# Patient Record
Sex: FEMALE | URBAN - METROPOLITAN AREA
[De-identification: names, ages, dates, MRNs, and addresses within clinical notes are randomized per-mention and may not be internally consistent; named-entity substitution may affect disease eponyms.]

---

## 2017-03-29 ENCOUNTER — INPATIENT (INPATIENT)
Facility: HOSPITAL | Age: 72
LOS: 6 days | Discharge: NURSING, REHAB OR LONG-TERM CARE | DRG: 190 | End: 2017-04-05
Attending: INTERNAL MEDICINE | Admitting: INTERNAL MEDICINE
Payer: MEDICARE

## 2017-03-29 VITALS
SYSTOLIC BLOOD PRESSURE: 109 MMHG | WEIGHT: 199.96 LBS | TEMPERATURE: 99 F | HEIGHT: 63 IN | DIASTOLIC BLOOD PRESSURE: 62 MMHG | RESPIRATION RATE: 30 BRPM | HEART RATE: 109 BPM | OXYGEN SATURATION: 96 %

## 2017-03-29 DIAGNOSIS — I50.9 HEART FAILURE, UNSPECIFIED: ICD-10-CM

## 2017-03-29 LAB
ALBUMIN SERPL ELPH-MCNC: 3 G/DL — LOW (ref 3.5–5)
ALP SERPL-CCNC: 68 U/L — SIGNIFICANT CHANGE UP (ref 40–120)
ALT FLD-CCNC: 20 U/L DA — SIGNIFICANT CHANGE UP (ref 10–60)
ANION GAP SERPL CALC-SCNC: 10 MMOL/L — SIGNIFICANT CHANGE UP (ref 5–17)
AST SERPL-CCNC: 15 U/L — SIGNIFICANT CHANGE UP (ref 10–40)
BASE EXCESS BLDA CALC-SCNC: 10.7 MMOL/L — HIGH (ref -2–2)
BASE EXCESS BLDA CALC-SCNC: 11 MMOL/L — HIGH (ref -2–2)
BASE EXCESS BLDA CALC-SCNC: 8.2 MMOL/L — HIGH (ref -2–2)
BASOPHILS # BLD AUTO: 0.1 K/UL — SIGNIFICANT CHANGE UP (ref 0–0.2)
BASOPHILS NFR BLD AUTO: 1.2 % — SIGNIFICANT CHANGE UP (ref 0–2)
BILIRUB SERPL-MCNC: 0.2 MG/DL — SIGNIFICANT CHANGE UP (ref 0.2–1.2)
BLOOD GAS COMMENTS ARTERIAL: SIGNIFICANT CHANGE UP
BLOOD GAS COMMENTS ARTERIAL: SIGNIFICANT CHANGE UP
BUN SERPL-MCNC: 29 MG/DL — HIGH (ref 7–18)
CALCIUM SERPL-MCNC: 9.1 MG/DL — SIGNIFICANT CHANGE UP (ref 8.4–10.5)
CHLORIDE SERPL-SCNC: 97 MMOL/L — SIGNIFICANT CHANGE UP (ref 96–108)
CK MB BLD-MCNC: <1.4 % — SIGNIFICANT CHANGE UP (ref 0–3.5)
CK MB CFR SERPL CALC: <1 NG/ML — SIGNIFICANT CHANGE UP (ref 0–3.6)
CK SERPL-CCNC: 71 U/L — SIGNIFICANT CHANGE UP (ref 21–215)
CO2 SERPL-SCNC: 33 MMOL/L — HIGH (ref 22–31)
CREAT SERPL-MCNC: 1.02 MG/DL — SIGNIFICANT CHANGE UP (ref 0.5–1.3)
EOSINOPHIL # BLD AUTO: 0.2 K/UL — SIGNIFICANT CHANGE UP (ref 0–0.5)
EOSINOPHIL NFR BLD AUTO: 1.7 % — SIGNIFICANT CHANGE UP (ref 0–6)
GLUCOSE SERPL-MCNC: 142 MG/DL — HIGH (ref 70–99)
HCO3 BLDA-SCNC: 35 MMOL/L — HIGH (ref 23–27)
HCO3 BLDA-SCNC: 39 MMOL/L — HIGH (ref 23–27)
HCO3 BLDA-SCNC: 39 MMOL/L — HIGH (ref 23–27)
HCT VFR BLD CALC: 35.3 % — SIGNIFICANT CHANGE UP (ref 34.5–45)
HGB BLD-MCNC: 11.1 G/DL — LOW (ref 11.5–15.5)
HMPV RNA SPEC QL NAA+PROBE: DETECTED
HOROWITZ INDEX BLDA+IHG-RTO: 100 — SIGNIFICANT CHANGE UP
HOROWITZ INDEX BLDA+IHG-RTO: 40 — SIGNIFICANT CHANGE UP
HOROWITZ INDEX BLDA+IHG-RTO: 40 — SIGNIFICANT CHANGE UP
LYMPHOCYTES # BLD AUTO: 1.8 K/UL — SIGNIFICANT CHANGE UP (ref 1–3.3)
LYMPHOCYTES # BLD AUTO: 20.7 % — SIGNIFICANT CHANGE UP (ref 13–44)
MCHC RBC-ENTMCNC: 26.3 PG — LOW (ref 27–34)
MCHC RBC-ENTMCNC: 31.4 GM/DL — LOW (ref 32–36)
MCV RBC AUTO: 83.6 FL — SIGNIFICANT CHANGE UP (ref 80–100)
MONOCYTES # BLD AUTO: 1.2 K/UL — HIGH (ref 0–0.9)
MONOCYTES NFR BLD AUTO: 13.6 % — SIGNIFICANT CHANGE UP (ref 2–14)
NEUTROPHILS # BLD AUTO: 5.5 K/UL — SIGNIFICANT CHANGE UP (ref 1.8–7.4)
NEUTROPHILS NFR BLD AUTO: 62.8 % — SIGNIFICANT CHANGE UP (ref 43–77)
PCO2 BLDA: 64 MMHG — HIGH (ref 32–46)
PCO2 BLDA: 77 MMHG — CRITICAL HIGH (ref 32–46)
PCO2 BLDA: 81 MMHG — CRITICAL HIGH (ref 32–46)
PH BLDA: 7.31 — LOW (ref 7.35–7.45)
PH BLDA: 7.33 — LOW (ref 7.35–7.45)
PH BLDA: 7.36 — SIGNIFICANT CHANGE UP (ref 7.35–7.45)
PLATELET # BLD AUTO: 277 K/UL — SIGNIFICANT CHANGE UP (ref 150–400)
PO2 BLDA: 131 MMHG — HIGH (ref 74–108)
PO2 BLDA: 194 MMHG — HIGH (ref 74–108)
PO2 BLDA: 69 MMHG — LOW (ref 74–108)
POTASSIUM SERPL-MCNC: 3.7 MMOL/L — SIGNIFICANT CHANGE UP (ref 3.5–5.3)
POTASSIUM SERPL-SCNC: 3.7 MMOL/L — SIGNIFICANT CHANGE UP (ref 3.5–5.3)
PROT SERPL-MCNC: 6.9 G/DL — SIGNIFICANT CHANGE UP (ref 6–8.3)
RAPID RVP RESULT: DETECTED
RBC # BLD: 4.22 M/UL — SIGNIFICANT CHANGE UP (ref 3.8–5.2)
RBC # FLD: 15.5 % — HIGH (ref 10.3–14.5)
SAO2 % BLDA: 100 % — HIGH (ref 92–96)
SAO2 % BLDA: 92 % — SIGNIFICANT CHANGE UP (ref 92–96)
SAO2 % BLDA: 99 % — HIGH (ref 92–96)
SODIUM SERPL-SCNC: 140 MMOL/L — SIGNIFICANT CHANGE UP (ref 135–145)
TROPONIN I SERPL-MCNC: <0.015 NG/ML — SIGNIFICANT CHANGE UP (ref 0–0.04)
WBC # BLD: 8.8 K/UL — SIGNIFICANT CHANGE UP (ref 3.8–10.5)
WBC # FLD AUTO: 8.8 K/UL — SIGNIFICANT CHANGE UP (ref 3.8–10.5)

## 2017-03-29 PROCEDURE — 71010: CPT | Mod: 26

## 2017-03-29 PROCEDURE — 99291 CRITICAL CARE FIRST HOUR: CPT

## 2017-03-29 RX ORDER — FUROSEMIDE 40 MG
20 TABLET ORAL ONCE
Refills: 0 | Status: COMPLETED | OUTPATIENT
Start: 2017-03-29 | End: 2017-03-29

## 2017-03-29 RX ORDER — SODIUM CHLORIDE 9 MG/ML
1000 INJECTION, SOLUTION INTRAVENOUS
Refills: 0 | Status: DISCONTINUED | OUTPATIENT
Start: 2017-03-29 | End: 2017-04-05

## 2017-03-29 RX ORDER — DEXTROSE 50 % IN WATER 50 %
1 SYRINGE (ML) INTRAVENOUS ONCE
Refills: 0 | Status: DISCONTINUED | OUTPATIENT
Start: 2017-03-29 | End: 2017-04-05

## 2017-03-29 RX ORDER — MONTELUKAST 4 MG/1
10 TABLET, CHEWABLE ORAL DAILY
Refills: 0 | Status: DISCONTINUED | OUTPATIENT
Start: 2017-03-29 | End: 2017-04-05

## 2017-03-29 RX ORDER — GLUCAGON INJECTION, SOLUTION 0.5 MG/.1ML
1 INJECTION, SOLUTION SUBCUTANEOUS ONCE
Refills: 0 | Status: DISCONTINUED | OUTPATIENT
Start: 2017-03-29 | End: 2017-04-05

## 2017-03-29 RX ORDER — ENOXAPARIN SODIUM 100 MG/ML
40 INJECTION SUBCUTANEOUS DAILY
Refills: 0 | Status: DISCONTINUED | OUTPATIENT
Start: 2017-03-29 | End: 2017-04-05

## 2017-03-29 RX ORDER — DEXTROSE 50 % IN WATER 50 %
25 SYRINGE (ML) INTRAVENOUS ONCE
Refills: 0 | Status: DISCONTINUED | OUTPATIENT
Start: 2017-03-29 | End: 2017-04-05

## 2017-03-29 RX ORDER — FUROSEMIDE 40 MG
40 TABLET ORAL EVERY 12 HOURS
Refills: 0 | Status: DISCONTINUED | OUTPATIENT
Start: 2017-03-29 | End: 2017-03-30

## 2017-03-29 RX ORDER — FUROSEMIDE 40 MG
40 TABLET ORAL ONCE
Refills: 0 | Status: COMPLETED | OUTPATIENT
Start: 2017-03-29 | End: 2017-03-29

## 2017-03-29 RX ORDER — DEXTROSE 50 % IN WATER 50 %
12.5 SYRINGE (ML) INTRAVENOUS ONCE
Refills: 0 | Status: DISCONTINUED | OUTPATIENT
Start: 2017-03-29 | End: 2017-04-05

## 2017-03-29 RX ORDER — IPRATROPIUM/ALBUTEROL SULFATE 18-103MCG
3 AEROSOL WITH ADAPTER (GRAM) INHALATION ONCE
Refills: 0 | Status: COMPLETED | OUTPATIENT
Start: 2017-03-29 | End: 2017-03-29

## 2017-03-29 RX ORDER — ATORVASTATIN CALCIUM 80 MG/1
20 TABLET, FILM COATED ORAL AT BEDTIME
Refills: 0 | Status: DISCONTINUED | OUTPATIENT
Start: 2017-03-29 | End: 2017-04-05

## 2017-03-29 RX ORDER — ASPIRIN/CALCIUM CARB/MAGNESIUM 324 MG
81 TABLET ORAL DAILY
Refills: 0 | Status: DISCONTINUED | OUTPATIENT
Start: 2017-03-29 | End: 2017-04-05

## 2017-03-29 RX ORDER — PANTOPRAZOLE SODIUM 20 MG/1
40 TABLET, DELAYED RELEASE ORAL DAILY
Refills: 0 | Status: DISCONTINUED | OUTPATIENT
Start: 2017-03-29 | End: 2017-03-31

## 2017-03-29 RX ORDER — LOSARTAN POTASSIUM 100 MG/1
100 TABLET, FILM COATED ORAL DAILY
Refills: 0 | Status: DISCONTINUED | OUTPATIENT
Start: 2017-03-29 | End: 2017-04-05

## 2017-03-29 RX ORDER — INSULIN LISPRO 100/ML
VIAL (ML) SUBCUTANEOUS
Refills: 0 | Status: DISCONTINUED | OUTPATIENT
Start: 2017-03-29 | End: 2017-03-31

## 2017-03-29 RX ORDER — LEVOTHYROXINE SODIUM 125 MCG
75 TABLET ORAL DAILY
Refills: 0 | Status: DISCONTINUED | OUTPATIENT
Start: 2017-03-29 | End: 2017-04-05

## 2017-03-29 RX ORDER — CEFTRIAXONE 500 MG/1
1 INJECTION, POWDER, FOR SOLUTION INTRAMUSCULAR; INTRAVENOUS EVERY 24 HOURS
Refills: 0 | Status: DISCONTINUED | OUTPATIENT
Start: 2017-03-29 | End: 2017-04-01

## 2017-03-29 RX ORDER — OLANZAPINE 15 MG/1
10 TABLET, FILM COATED ORAL DAILY
Refills: 0 | Status: DISCONTINUED | OUTPATIENT
Start: 2017-03-29 | End: 2017-04-05

## 2017-03-29 RX ORDER — IPRATROPIUM/ALBUTEROL SULFATE 18-103MCG
3 AEROSOL WITH ADAPTER (GRAM) INHALATION EVERY 6 HOURS
Refills: 0 | Status: DISCONTINUED | OUTPATIENT
Start: 2017-03-29 | End: 2017-04-05

## 2017-03-29 RX ADMIN — Medication 125 MILLIGRAM(S): at 14:47

## 2017-03-29 RX ADMIN — Medication 3 MILLILITER(S): at 14:47

## 2017-03-29 RX ADMIN — ENOXAPARIN SODIUM 40 MILLIGRAM(S): 100 INJECTION SUBCUTANEOUS at 17:51

## 2017-03-29 RX ADMIN — Medication 20 MILLIGRAM(S): at 15:38

## 2017-03-29 RX ADMIN — Medication 40 MILLIGRAM(S): at 16:58

## 2017-03-29 RX ADMIN — Medication 3 MILLILITER(S): at 20:55

## 2017-03-29 RX ADMIN — CEFTRIAXONE 100 GRAM(S): 500 INJECTION, POWDER, FOR SOLUTION INTRAMUSCULAR; INTRAVENOUS at 16:06

## 2017-03-29 RX ADMIN — PANTOPRAZOLE SODIUM 40 MILLIGRAM(S): 20 TABLET, DELAYED RELEASE ORAL at 22:54

## 2017-03-29 RX ADMIN — Medication 40 MILLIGRAM(S): at 17:48

## 2017-03-29 NOTE — H&P ADULT. - PROBLEM SELECTOR PLAN 1
Most likely due to COPD exacerbation possibly due to flu or pneumonia  Admit to ICU  - Continue with noninvasive mechanical ventilation  - Continue with NPO status  - Continue with duoneb  - Continue with solu-medrol  - Continue to monitor respiratory improvement. Transition to nasal cannula as respiratory status improves.  - Continue with lasix for diuresis  - Plan to intubate patient if respiratory status worsens.

## 2017-03-29 NOTE — H&P ADULT. - NEGATIVE CARDIOVASCULAR SYMPTOMS
no chest pain/no palpitations/no orthopnea/no paroxysmal nocturnal dyspnea/no peripheral edema/no claudication

## 2017-03-29 NOTE — H&P ADULT. - PROBLEM SELECTOR PLAN 7
Bilateral lower extremity cellulitis which was treated with 14 days of outpatient doxycycline.  - Continue with ceftriaxone for COPD exacerbation which will cover cellulitis as well  - Send blood cultures if patient has fever  - Area marked, monitor for improvement.

## 2017-03-29 NOTE — H&P ADULT. - ATTENDING COMMENTS
71 female with schizoaffective disorder, asthma/copd, diastolic CHF, morbid obesity, presents with acute respiratory failure requiring bipap, wheezing on exam, evidence of fluid overload on CXR and physical exam.  Suspect major issue is acute on chronic diastolic CHF exacerbation.  Will treat with bipap, diuretics, ceftriaxone for possible pneumonia and bilat lower extremity cellulitis (may simply be stasis dermatitis from swelling), steroids, nebs, check echo, troponins, and RVP.  Total CC time 35 min.

## 2017-03-29 NOTE — ED PROVIDER NOTE - PMH
Arthritis    Bipolar 1 disorder    Chronic diastolic congestive heart failure    Chronic obstructive pulmonary disease, unspecified COPD type    HTN (hypertension)    Hypercholesteremia    Hypothyroidism    Schizo affective schizophrenia

## 2017-03-29 NOTE — ED PROVIDER NOTE - CRITICAL CARE PROVIDED
direct patient care (not related to procedure)/additional history taking/interpretation of diagnostic studies/documentation/consultation with other physicians

## 2017-03-29 NOTE — H&P ADULT. - HISTORY OF PRESENT ILLNESS
71 [facility], [facility], HT[facility], SIADH, hypothyroidism, schizoaffective hypercholesterolemia sent to the ED for shortness of breath.  As per the NH, patient was fine until 1 pm today when she complained of difficulty breathing initially on minimal exertion and then at rest. She was given a respiratory treatment with minimal improvement. She was found to be wheezing despite the respiratory treatment and became slightly lethargic at which point EMS was called and she was transferred to [facility] ED. She has bilateral lower extremity redness that was treated with doxycyline for the last 14 days at the NH. While in the ER, patient was found to be in moderate respiratory distress and placed on non-invasive mechanical ventilation. ICU was consulted for continuous need of bipap and concern for need of invasive mechanical ventilation. There was no history of cough, fever, chest pain, abdominal pain, leg swelling or syncope. 71 year old female from [facility] PMH of COPD, asthma, CHFpef Grade 3 DD, HTN, SIADH, hypothyroidism, schizoaffective hypercholesterolemia sent to the ED for shortness of breath.  As per the NH, patient was fine until 1 pm today when she complained of difficulty breathing initially on minimal exertion and then at rest. She was given a respiratory treatment with minimal improvement. She was found to be wheezing despite the respiratory treatment and became slightly lethargic at which point EMS was called and she was transferred to [facility] ED. She has bilateral lower extremity redness that was treated with doxycyline for the last 14 days at the NH. While in the ER, patient was found to be in moderate respiratory distress and placed on non-invasive mechanical ventilation. ICU was consulted for continuous need of bipap and concern for need of invasive mechanical ventilation. There was no history of cough, fever, chest pain, abdominal pain, leg swelling or syncope.

## 2017-03-29 NOTE — ED PROVIDER NOTE - OBJECTIVE STATEMENT
pt arrived by ems in resp distress with bialt wheezing use of accessory muscles and diffituly speaking

## 2017-03-29 NOTE — H&P ADULT. - PROBLEM SELECTOR PLAN 4
- Continue with zyprexa  - Restart risperdal when patient is stable  - No need for psychiatry consult at this time

## 2017-03-29 NOTE — H&P ADULT. - PROBLEM SELECTOR PLAN 3
- Continue with zyprexa  - Restart risperdal when patient stable  - No need for psychiatry consult at this time

## 2017-03-29 NOTE — H&P ADULT. - PROBLEM SELECTOR PLAN 2
CHFpef Grade 3 diastolic dysfunction  - No signs of acute exacerbation  - Continue with lasix BID. Transition to once a day as respiratory status improves.  - Monitor I/O  - Monitor daily weight  - Continue with statin  - Continue with losartan  - Continue with statin  - Restart zaroxlyn when patient is stable

## 2017-03-29 NOTE — ED ADULT NURSE NOTE - OBJECTIVE STATEMENT
Patient received as notification for shortness of breath. Patient noted lethargic responsive to pain noted with toya scattered wheezing.

## 2017-03-29 NOTE — ED ADULT TRIAGE NOTE - CHIEF COMPLAINT QUOTE
bib/ems notification for respiratory distress , sent from NH for sudden onset for sob , wheezing . given albuterol nebs by ems

## 2017-03-29 NOTE — H&P ADULT. - PMH
Arthritis    Bipolar 1 disorder    Chronic obstructive pulmonary disease, unspecified COPD type    HTN (hypertension)    Hypercholesteremia    Hypothyroidism    Schizo affective schizophrenia Arthritis    Bipolar 1 disorder    Chronic diastolic congestive heart failure    Chronic obstructive pulmonary disease, unspecified COPD type    HTN (hypertension)    Hypercholesteremia    Hypothyroidism    Schizo affective schizophrenia

## 2017-03-29 NOTE — H&P ADULT. - ASSESSMENT
71 year old female from [facility] PMH of COPD, asthma, CHFpef Grade 3 DD, HTN, SIADH, hypothyroidism, schizoaffective hypercholesterolemia sent to the ED for shortness of breath. Most likely due to COPD exacerbation possibly due to flu or pneumonia. Chest x ray evident for slight interstitial markings which may signify vascular congestion contributing to respiratory failure.

## 2017-03-30 DIAGNOSIS — L03.119 CELLULITIS OF UNSPECIFIED PART OF LIMB: ICD-10-CM

## 2017-03-30 DIAGNOSIS — E03.9 HYPOTHYROIDISM, UNSPECIFIED: ICD-10-CM

## 2017-03-30 DIAGNOSIS — J96.01 ACUTE RESPIRATORY FAILURE WITH HYPOXIA: ICD-10-CM

## 2017-03-30 DIAGNOSIS — I50.32 CHRONIC DIASTOLIC (CONGESTIVE) HEART FAILURE: ICD-10-CM

## 2017-03-30 DIAGNOSIS — I15.9 SECONDARY HYPERTENSION, UNSPECIFIED: ICD-10-CM

## 2017-03-30 DIAGNOSIS — F25.0 SCHIZOAFFECTIVE DISORDER, BIPOLAR TYPE: ICD-10-CM

## 2017-03-30 DIAGNOSIS — F31.9 BIPOLAR DISORDER, UNSPECIFIED: ICD-10-CM

## 2017-03-30 LAB
ALBUMIN SERPL ELPH-MCNC: 3 G/DL — LOW (ref 3.5–5)
ALP SERPL-CCNC: 67 U/L — SIGNIFICANT CHANGE UP (ref 40–120)
ALT FLD-CCNC: 21 U/L DA — SIGNIFICANT CHANGE UP (ref 10–60)
ANION GAP SERPL CALC-SCNC: 7 MMOL/L — SIGNIFICANT CHANGE UP (ref 5–17)
APTT BLD: 41.4 SEC — HIGH (ref 27.5–37.4)
AST SERPL-CCNC: 17 U/L — SIGNIFICANT CHANGE UP (ref 10–40)
BASE EXCESS BLDA CALC-SCNC: 12.4 MMOL/L — HIGH (ref -2–2)
BILIRUB SERPL-MCNC: 0.3 MG/DL — SIGNIFICANT CHANGE UP (ref 0.2–1.2)
BUN SERPL-MCNC: 26 MG/DL — HIGH (ref 7–18)
BUN SERPL-MCNC: 28 MG/DL — HIGH (ref 7–18)
BUN SERPL-MCNC: 28 MG/DL — HIGH (ref 7–18)
CALCIUM SERPL-MCNC: 9.2 MG/DL — SIGNIFICANT CHANGE UP (ref 8.4–10.5)
CALCIUM SERPL-MCNC: 9.3 MG/DL — SIGNIFICANT CHANGE UP (ref 8.4–10.5)
CALCIUM SERPL-MCNC: 9.3 MG/DL — SIGNIFICANT CHANGE UP (ref 8.4–10.5)
CHLORIDE SERPL-SCNC: 94 MMOL/L — LOW (ref 96–108)
CHLORIDE SERPL-SCNC: 95 MMOL/L — LOW (ref 96–108)
CHLORIDE SERPL-SCNC: 98 MMOL/L — SIGNIFICANT CHANGE UP (ref 96–108)
CO2 SERPL-SCNC: 36 MMOL/L — HIGH (ref 22–31)
CO2 SERPL-SCNC: 38 MMOL/L — HIGH (ref 22–31)
CO2 SERPL-SCNC: 41 MMOL/L — HIGH (ref 22–31)
CREAT SERPL-MCNC: 0.82 MG/DL — SIGNIFICANT CHANGE UP (ref 0.5–1.3)
CREAT SERPL-MCNC: 0.86 MG/DL — SIGNIFICANT CHANGE UP (ref 0.5–1.3)
CREAT SERPL-MCNC: 0.88 MG/DL — SIGNIFICANT CHANGE UP (ref 0.5–1.3)
GLUCOSE SERPL-MCNC: 177 MG/DL — HIGH (ref 70–99)
GLUCOSE SERPL-MCNC: 180 MG/DL — HIGH (ref 70–99)
GLUCOSE SERPL-MCNC: 186 MG/DL — HIGH (ref 70–99)
HBA1C BLD-MCNC: 7.1 % — HIGH (ref 4–5.6)
HCO3 BLDA-SCNC: 39 MMOL/L — HIGH (ref 23–27)
HCT VFR BLD CALC: 35.9 % — SIGNIFICANT CHANGE UP (ref 34.5–45)
HGB BLD-MCNC: 11.2 G/DL — LOW (ref 11.5–15.5)
HOROWITZ INDEX BLDA+IHG-RTO: SIGNIFICANT CHANGE UP
INR BLD: 1.1 RATIO — SIGNIFICANT CHANGE UP (ref 0.88–1.16)
MAGNESIUM SERPL-MCNC: 2 MG/DL — SIGNIFICANT CHANGE UP (ref 1.8–2.4)
MAGNESIUM SERPL-MCNC: 2.2 MG/DL — SIGNIFICANT CHANGE UP (ref 1.8–2.4)
MCHC RBC-ENTMCNC: 26 PG — LOW (ref 27–34)
MCHC RBC-ENTMCNC: 31.3 GM/DL — LOW (ref 32–36)
MCV RBC AUTO: 83 FL — SIGNIFICANT CHANGE UP (ref 80–100)
PCO2 BLDA: 64 MMHG — HIGH (ref 32–46)
PH BLDA: 7.4 — SIGNIFICANT CHANGE UP (ref 7.35–7.45)
PHOSPHATE SERPL-MCNC: 2 MG/DL — LOW (ref 2.5–4.5)
PHOSPHATE SERPL-MCNC: 3.8 MG/DL — SIGNIFICANT CHANGE UP (ref 2.5–4.5)
PLATELET # BLD AUTO: 267 K/UL — SIGNIFICANT CHANGE UP (ref 150–400)
PO2 BLDA: 89 MMHG — SIGNIFICANT CHANGE UP (ref 74–108)
POTASSIUM SERPL-MCNC: 2.8 MMOL/L — CRITICAL LOW (ref 3.5–5.3)
POTASSIUM SERPL-MCNC: 3 MMOL/L — LOW (ref 3.5–5.3)
POTASSIUM SERPL-MCNC: 3.7 MMOL/L — SIGNIFICANT CHANGE UP (ref 3.5–5.3)
POTASSIUM SERPL-SCNC: 2.8 MMOL/L — CRITICAL LOW (ref 3.5–5.3)
POTASSIUM SERPL-SCNC: 3 MMOL/L — LOW (ref 3.5–5.3)
POTASSIUM SERPL-SCNC: 3.7 MMOL/L — SIGNIFICANT CHANGE UP (ref 3.5–5.3)
PROT SERPL-MCNC: 7.1 G/DL — SIGNIFICANT CHANGE UP (ref 6–8.3)
PROTHROM AB SERPL-ACNC: 12 SEC — SIGNIFICANT CHANGE UP (ref 9.8–12.7)
RBC # BLD: 4.32 M/UL — SIGNIFICANT CHANGE UP (ref 3.8–5.2)
RBC # FLD: 15.3 % — HIGH (ref 10.3–14.5)
SAO2 % BLDA: 97 % — HIGH (ref 92–96)
SODIUM SERPL-SCNC: 140 MMOL/L — SIGNIFICANT CHANGE UP (ref 135–145)
SODIUM SERPL-SCNC: 141 MMOL/L — SIGNIFICANT CHANGE UP (ref 135–145)
SODIUM SERPL-SCNC: 142 MMOL/L — SIGNIFICANT CHANGE UP (ref 135–145)
WBC # BLD: 7.3 K/UL — SIGNIFICANT CHANGE UP (ref 3.8–10.5)
WBC # FLD AUTO: 7.3 K/UL — SIGNIFICANT CHANGE UP (ref 3.8–10.5)

## 2017-03-30 PROCEDURE — 71010: CPT | Mod: 26

## 2017-03-30 RX ORDER — POTASSIUM CHLORIDE 20 MEQ
20 PACKET (EA) ORAL
Refills: 0 | Status: DISCONTINUED | OUTPATIENT
Start: 2017-03-30 | End: 2017-03-30

## 2017-03-30 RX ORDER — POTASSIUM CHLORIDE 20 MEQ
10 PACKET (EA) ORAL
Refills: 0 | Status: COMPLETED | OUTPATIENT
Start: 2017-03-30 | End: 2017-03-30

## 2017-03-30 RX ORDER — POTASSIUM CHLORIDE 20 MEQ
40 PACKET (EA) ORAL EVERY 4 HOURS
Refills: 0 | Status: COMPLETED | OUTPATIENT
Start: 2017-03-30 | End: 2017-03-30

## 2017-03-30 RX ORDER — POTASSIUM PHOSPHATE, MONOBASIC POTASSIUM PHOSPHATE, DIBASIC 236; 224 MG/ML; MG/ML
15 INJECTION, SOLUTION INTRAVENOUS ONCE
Refills: 0 | Status: COMPLETED | OUTPATIENT
Start: 2017-03-30 | End: 2017-03-30

## 2017-03-30 RX ORDER — FUROSEMIDE 40 MG
40 TABLET ORAL DAILY
Refills: 0 | Status: DISCONTINUED | OUTPATIENT
Start: 2017-03-31 | End: 2017-03-31

## 2017-03-30 RX ORDER — POTASSIUM CHLORIDE 20 MEQ
40 PACKET (EA) ORAL
Refills: 0 | Status: DISCONTINUED | OUTPATIENT
Start: 2017-03-30 | End: 2017-03-30

## 2017-03-30 RX ADMIN — Medication 3 MILLILITER(S): at 20:06

## 2017-03-30 RX ADMIN — Medication 100 MILLIEQUIVALENT(S): at 17:15

## 2017-03-30 RX ADMIN — OLANZAPINE 10 MILLIGRAM(S): 15 TABLET, FILM COATED ORAL at 11:02

## 2017-03-30 RX ADMIN — POTASSIUM PHOSPHATE, MONOBASIC POTASSIUM PHOSPHATE, DIBASIC 62.5 MILLIMOLE(S): 236; 224 INJECTION, SOLUTION INTRAVENOUS at 22:15

## 2017-03-30 RX ADMIN — PANTOPRAZOLE SODIUM 40 MILLIGRAM(S): 20 TABLET, DELAYED RELEASE ORAL at 11:02

## 2017-03-30 RX ADMIN — Medication 40 MILLIGRAM(S): at 17:15

## 2017-03-30 RX ADMIN — Medication 2: at 11:00

## 2017-03-30 RX ADMIN — Medication 100 MILLIEQUIVALENT(S): at 16:26

## 2017-03-30 RX ADMIN — Medication 100 MILLIEQUIVALENT(S): at 12:15

## 2017-03-30 RX ADMIN — Medication 3 MILLILITER(S): at 09:02

## 2017-03-30 RX ADMIN — Medication 100 MILLIEQUIVALENT(S): at 10:50

## 2017-03-30 RX ADMIN — Medication 40 MILLIEQUIVALENT(S): at 18:21

## 2017-03-30 RX ADMIN — Medication 75 MICROGRAM(S): at 06:26

## 2017-03-30 RX ADMIN — Medication 40 MILLIGRAM(S): at 11:02

## 2017-03-30 RX ADMIN — Medication 40 MILLIEQUIVALENT(S): at 15:51

## 2017-03-30 RX ADMIN — Medication 1: at 16:09

## 2017-03-30 RX ADMIN — Medication 3 MILLILITER(S): at 14:15

## 2017-03-30 RX ADMIN — Medication 81 MILLIGRAM(S): at 11:02

## 2017-03-30 RX ADMIN — Medication 40 MILLIGRAM(S): at 23:20

## 2017-03-30 RX ADMIN — Medication 100 MILLIEQUIVALENT(S): at 09:38

## 2017-03-30 RX ADMIN — Medication 40 MILLIGRAM(S): at 00:52

## 2017-03-30 RX ADMIN — ENOXAPARIN SODIUM 40 MILLIGRAM(S): 100 INJECTION SUBCUTANEOUS at 11:02

## 2017-03-30 RX ADMIN — Medication 100 MILLIEQUIVALENT(S): at 15:34

## 2017-03-30 RX ADMIN — Medication 40 MILLIGRAM(S): at 00:51

## 2017-03-30 RX ADMIN — CEFTRIAXONE 100 GRAM(S): 500 INJECTION, POWDER, FOR SOLUTION INTRAMUSCULAR; INTRAVENOUS at 16:08

## 2017-03-30 RX ADMIN — Medication 3 MILLILITER(S): at 02:08

## 2017-03-30 RX ADMIN — Medication 40 MILLIGRAM(S): at 06:26

## 2017-03-30 RX ADMIN — MONTELUKAST 10 MILLIGRAM(S): 4 TABLET, CHEWABLE ORAL at 11:02

## 2017-03-31 LAB
ALBUMIN SERPL ELPH-MCNC: 2.9 G/DL — LOW (ref 3.5–5)
ALP SERPL-CCNC: 57 U/L — SIGNIFICANT CHANGE UP (ref 40–120)
ALT FLD-CCNC: 19 U/L DA — SIGNIFICANT CHANGE UP (ref 10–60)
ANION GAP SERPL CALC-SCNC: 6 MMOL/L — SIGNIFICANT CHANGE UP (ref 5–17)
ANION GAP SERPL CALC-SCNC: 8 MMOL/L — SIGNIFICANT CHANGE UP (ref 5–17)
AST SERPL-CCNC: 17 U/L — SIGNIFICANT CHANGE UP (ref 10–40)
BASE EXCESS BLDA CALC-SCNC: 12.6 MMOL/L — HIGH (ref -2–2)
BASOPHILS # BLD AUTO: 0 K/UL — SIGNIFICANT CHANGE UP (ref 0–0.2)
BASOPHILS NFR BLD AUTO: 0.1 % — SIGNIFICANT CHANGE UP (ref 0–2)
BILIRUB SERPL-MCNC: 0.2 MG/DL — SIGNIFICANT CHANGE UP (ref 0.2–1.2)
BLOOD GAS COMMENTS ARTERIAL: SIGNIFICANT CHANGE UP
BUN SERPL-MCNC: 28 MG/DL — HIGH (ref 7–18)
BUN SERPL-MCNC: 29 MG/DL — HIGH (ref 7–18)
CALCIUM SERPL-MCNC: 9 MG/DL — SIGNIFICANT CHANGE UP (ref 8.4–10.5)
CALCIUM SERPL-MCNC: 9.3 MG/DL — SIGNIFICANT CHANGE UP (ref 8.4–10.5)
CHLORIDE SERPL-SCNC: 97 MMOL/L — SIGNIFICANT CHANGE UP (ref 96–108)
CHLORIDE SERPL-SCNC: 99 MMOL/L — SIGNIFICANT CHANGE UP (ref 96–108)
CO2 SERPL-SCNC: 37 MMOL/L — HIGH (ref 22–31)
CO2 SERPL-SCNC: 38 MMOL/L — HIGH (ref 22–31)
CREAT SERPL-MCNC: 0.79 MG/DL — SIGNIFICANT CHANGE UP (ref 0.5–1.3)
CREAT SERPL-MCNC: 1.11 MG/DL — SIGNIFICANT CHANGE UP (ref 0.5–1.3)
EOSINOPHIL # BLD AUTO: 0 K/UL — SIGNIFICANT CHANGE UP (ref 0–0.5)
EOSINOPHIL NFR BLD AUTO: 0 % — SIGNIFICANT CHANGE UP (ref 0–6)
GLUCOSE SERPL-MCNC: 173 MG/DL — HIGH (ref 70–99)
GLUCOSE SERPL-MCNC: 302 MG/DL — HIGH (ref 70–99)
GRAM STN FLD: SIGNIFICANT CHANGE UP
HCO3 BLDA-SCNC: 38 MMOL/L — HIGH (ref 23–27)
HCT VFR BLD CALC: 34.8 % — SIGNIFICANT CHANGE UP (ref 34.5–45)
HGB BLD-MCNC: 11.1 G/DL — LOW (ref 11.5–15.5)
HOROWITZ INDEX BLDA+IHG-RTO: 40 — SIGNIFICANT CHANGE UP
LYMPHOCYTES # BLD AUTO: 0.8 K/UL — LOW (ref 1–3.3)
LYMPHOCYTES # BLD AUTO: 6.4 % — LOW (ref 13–44)
MAGNESIUM SERPL-MCNC: 2.3 MG/DL — SIGNIFICANT CHANGE UP (ref 1.8–2.4)
MAGNESIUM SERPL-MCNC: 2.4 MG/DL — SIGNIFICANT CHANGE UP (ref 1.8–2.4)
MCHC RBC-ENTMCNC: 26.7 PG — LOW (ref 27–34)
MCHC RBC-ENTMCNC: 31.9 GM/DL — LOW (ref 32–36)
MCV RBC AUTO: 83.7 FL — SIGNIFICANT CHANGE UP (ref 80–100)
MONOCYTES # BLD AUTO: 0.6 K/UL — SIGNIFICANT CHANGE UP (ref 0–0.9)
MONOCYTES NFR BLD AUTO: 5 % — SIGNIFICANT CHANGE UP (ref 2–14)
NEUTROPHILS # BLD AUTO: 11.2 K/UL — HIGH (ref 1.8–7.4)
NEUTROPHILS NFR BLD AUTO: 88.6 % — HIGH (ref 43–77)
PCO2 BLDA: 52 MMHG — HIGH (ref 32–46)
PH BLDA: 7.47 — HIGH (ref 7.35–7.45)
PHOSPHATE SERPL-MCNC: 2.2 MG/DL — LOW (ref 2.5–4.5)
PHOSPHATE SERPL-MCNC: 3.2 MG/DL — SIGNIFICANT CHANGE UP (ref 2.5–4.5)
PLATELET # BLD AUTO: 264 K/UL — SIGNIFICANT CHANGE UP (ref 150–400)
PO2 BLDA: 85 MMHG — SIGNIFICANT CHANGE UP (ref 74–108)
POTASSIUM SERPL-MCNC: 3.4 MMOL/L — LOW (ref 3.5–5.3)
POTASSIUM SERPL-MCNC: 3.4 MMOL/L — LOW (ref 3.5–5.3)
POTASSIUM SERPL-SCNC: 3.4 MMOL/L — LOW (ref 3.5–5.3)
POTASSIUM SERPL-SCNC: 3.4 MMOL/L — LOW (ref 3.5–5.3)
PROT SERPL-MCNC: 6.8 G/DL — SIGNIFICANT CHANGE UP (ref 6–8.3)
RBC # BLD: 4.16 M/UL — SIGNIFICANT CHANGE UP (ref 3.8–5.2)
RBC # FLD: 15.3 % — HIGH (ref 10.3–14.5)
SAO2 % BLDA: 97 % — HIGH (ref 92–96)
SODIUM SERPL-SCNC: 140 MMOL/L — SIGNIFICANT CHANGE UP (ref 135–145)
SODIUM SERPL-SCNC: 145 MMOL/L — SIGNIFICANT CHANGE UP (ref 135–145)
WBC # BLD: 12.7 K/UL — HIGH (ref 3.8–10.5)
WBC # FLD AUTO: 12.7 K/UL — HIGH (ref 3.8–10.5)

## 2017-03-31 RX ORDER — POTASSIUM CHLORIDE 20 MEQ
40 PACKET (EA) ORAL ONCE
Refills: 0 | Status: DISCONTINUED | OUTPATIENT
Start: 2017-03-31 | End: 2017-03-31

## 2017-03-31 RX ORDER — POTASSIUM PHOSPHATE, MONOBASIC POTASSIUM PHOSPHATE, DIBASIC 236; 224 MG/ML; MG/ML
15 INJECTION, SOLUTION INTRAVENOUS ONCE
Refills: 0 | Status: DISCONTINUED | OUTPATIENT
Start: 2017-03-31 | End: 2017-03-31

## 2017-03-31 RX ORDER — FUROSEMIDE 40 MG
40 TABLET ORAL EVERY 12 HOURS
Refills: 0 | Status: DISCONTINUED | OUTPATIENT
Start: 2017-03-31 | End: 2017-04-03

## 2017-03-31 RX ORDER — POTASSIUM CHLORIDE 20 MEQ
20 PACKET (EA) ORAL ONCE
Refills: 0 | Status: DISCONTINUED | OUTPATIENT
Start: 2017-03-31 | End: 2017-03-31

## 2017-03-31 RX ORDER — INSULIN LISPRO 100/ML
VIAL (ML) SUBCUTANEOUS
Refills: 0 | Status: DISCONTINUED | OUTPATIENT
Start: 2017-03-31 | End: 2017-04-05

## 2017-03-31 RX ORDER — POTASSIUM CHLORIDE 20 MEQ
20 PACKET (EA) ORAL ONCE
Refills: 0 | Status: COMPLETED | OUTPATIENT
Start: 2017-03-31 | End: 2017-03-31

## 2017-03-31 RX ORDER — POTASSIUM PHOSPHATE, MONOBASIC POTASSIUM PHOSPHATE, DIBASIC 236; 224 MG/ML; MG/ML
15 INJECTION, SOLUTION INTRAVENOUS ONCE
Refills: 0 | Status: COMPLETED | OUTPATIENT
Start: 2017-03-31 | End: 2017-03-31

## 2017-03-31 RX ORDER — POTASSIUM CHLORIDE 20 MEQ
10 PACKET (EA) ORAL
Refills: 0 | Status: COMPLETED | OUTPATIENT
Start: 2017-03-31 | End: 2017-03-31

## 2017-03-31 RX ORDER — IPRATROPIUM/ALBUTEROL SULFATE 18-103MCG
3 AEROSOL WITH ADAPTER (GRAM) INHALATION ONCE
Refills: 0 | Status: COMPLETED | OUTPATIENT
Start: 2017-03-31 | End: 2017-03-31

## 2017-03-31 RX ORDER — VANCOMYCIN HCL 1 G
1000 VIAL (EA) INTRAVENOUS ONCE
Refills: 0 | Status: COMPLETED | OUTPATIENT
Start: 2017-03-31 | End: 2017-03-31

## 2017-03-31 RX ORDER — POTASSIUM CHLORIDE 20 MEQ
40 PACKET (EA) ORAL ONCE
Refills: 0 | Status: COMPLETED | OUTPATIENT
Start: 2017-03-31 | End: 2017-03-31

## 2017-03-31 RX ADMIN — Medication 100 MILLIEQUIVALENT(S): at 12:22

## 2017-03-31 RX ADMIN — Medication 3 MILLILITER(S): at 09:08

## 2017-03-31 RX ADMIN — Medication 3 MILLILITER(S): at 20:55

## 2017-03-31 RX ADMIN — ATORVASTATIN CALCIUM 20 MILLIGRAM(S): 80 TABLET, FILM COATED ORAL at 21:14

## 2017-03-31 RX ADMIN — Medication 40 MILLIGRAM(S): at 05:53

## 2017-03-31 RX ADMIN — Medication: at 16:08

## 2017-03-31 RX ADMIN — Medication 40 MILLIGRAM(S): at 21:14

## 2017-03-31 RX ADMIN — Medication 75 MICROGRAM(S): at 05:56

## 2017-03-31 RX ADMIN — Medication 250 MILLIGRAM(S): at 11:19

## 2017-03-31 RX ADMIN — ENOXAPARIN SODIUM 40 MILLIGRAM(S): 100 INJECTION SUBCUTANEOUS at 11:53

## 2017-03-31 RX ADMIN — Medication 40 MILLIGRAM(S): at 15:57

## 2017-03-31 RX ADMIN — Medication 3 MILLILITER(S): at 15:11

## 2017-03-31 RX ADMIN — Medication 1: at 06:09

## 2017-03-31 RX ADMIN — Medication 20 MILLIEQUIVALENT(S): at 23:40

## 2017-03-31 RX ADMIN — CEFTRIAXONE 100 GRAM(S): 500 INJECTION, POWDER, FOR SOLUTION INTRAMUSCULAR; INTRAVENOUS at 15:57

## 2017-03-31 RX ADMIN — POTASSIUM PHOSPHATE, MONOBASIC POTASSIUM PHOSPHATE, DIBASIC 62.5 MILLIMOLE(S): 236; 224 INJECTION, SOLUTION INTRAVENOUS at 20:31

## 2017-03-31 RX ADMIN — Medication 100 MILLIEQUIVALENT(S): at 11:19

## 2017-03-31 RX ADMIN — Medication: at 12:09

## 2017-03-31 RX ADMIN — Medication 3 MILLILITER(S): at 20:01

## 2017-03-31 RX ADMIN — Medication 40 MILLIEQUIVALENT(S): at 20:31

## 2017-03-31 RX ADMIN — Medication 3 MILLILITER(S): at 02:37

## 2017-03-31 RX ADMIN — Medication 40 MILLIGRAM(S): at 05:59

## 2017-03-31 RX ADMIN — Medication 40 MILLIGRAM(S): at 18:43

## 2017-03-31 RX ADMIN — Medication 100 MILLIEQUIVALENT(S): at 08:38

## 2017-03-31 NOTE — PHYSICAL THERAPY INITIAL EVALUATION ADULT - ACTIVE RANGE OF MOTION EXAMINATION, REHAB EVAL
left hip flexion AROM limited due to pain/bilateral upper extremity Active ROM was WFL (within functional limits)/Right LE Active ROM was WFL (within functional limits)

## 2017-03-31 NOTE — PHYSICAL THERAPY INITIAL EVALUATION ADULT - GENERAL OBSERVATIONS, REHAB EVAL
Chart reviewed; Pt received in bed, A&Ox1 (person), confused, NAD, +Catheter, +IV in left arm, DTI on left LE covered with bandage

## 2017-03-31 NOTE — DIETITIAN INITIAL EVALUATION ADULT. - OTHER INFO
Pt answers that she does not know to weight hx. States she is on weight watchers.NKFA. Pt reports she does not eat poultry (relayed to kitchen). BMI 43.1. Accepted diet ed/copy (Heart Failure Nutrition Therapy)

## 2017-03-31 NOTE — PHYSICAL THERAPY INITIAL EVALUATION ADULT - GAIT DEVIATIONS NOTED, PT EVAL
LLE shorter than RLE; steps on left forefoot/decreased tanvir/increased time in double stance/decreased step length/decreased stride length

## 2017-03-31 NOTE — PHYSICAL THERAPY INITIAL EVALUATION ADULT - IMPAIRMENTS FOUND, PT EVAL
aerobic capacity/endurance/gait, locomotion, and balance/muscle strength/ventilation and respiration/gas exchange

## 2017-03-31 NOTE — PHYSICAL THERAPY INITIAL EVALUATION ADULT - ADDITIONAL COMMENTS
As per chart, pt came from rehab facility ([facility]) and was on ambulation program for 15 ft. Pt was unable to give history, stating she doesn't remember why she was in facility.

## 2017-03-31 NOTE — PHYSICAL THERAPY INITIAL EVALUATION ADULT - CRITERIA FOR SKILLED THERAPEUTIC INTERVENTIONS
impairments found/risk reduction/prevention/rehab potential/therapy frequency/predicted duration of therapy intervention/anticipated equipment needs at discharge/anticipated discharge recommendation

## 2017-04-01 LAB
ALBUMIN SERPL ELPH-MCNC: 3.3 G/DL — LOW (ref 3.5–5)
ALP SERPL-CCNC: 64 U/L — SIGNIFICANT CHANGE UP (ref 40–120)
ALT FLD-CCNC: 22 U/L DA — SIGNIFICANT CHANGE UP (ref 10–60)
ANION GAP SERPL CALC-SCNC: 8 MMOL/L — SIGNIFICANT CHANGE UP (ref 5–17)
AST SERPL-CCNC: 18 U/L — SIGNIFICANT CHANGE UP (ref 10–40)
BASOPHILS # BLD AUTO: 0 K/UL — SIGNIFICANT CHANGE UP (ref 0–0.2)
BASOPHILS NFR BLD AUTO: 0.2 % — SIGNIFICANT CHANGE UP (ref 0–2)
BILIRUB SERPL-MCNC: 0.2 MG/DL — SIGNIFICANT CHANGE UP (ref 0.2–1.2)
BUN SERPL-MCNC: 26 MG/DL — HIGH (ref 7–18)
CALCIUM SERPL-MCNC: 9.5 MG/DL — SIGNIFICANT CHANGE UP (ref 8.4–10.5)
CHLORIDE SERPL-SCNC: 98 MMOL/L — SIGNIFICANT CHANGE UP (ref 96–108)
CO2 SERPL-SCNC: 38 MMOL/L — HIGH (ref 22–31)
CREAT SERPL-MCNC: 0.95 MG/DL — SIGNIFICANT CHANGE UP (ref 0.5–1.3)
CULTURE RESULTS: SIGNIFICANT CHANGE UP
EOSINOPHIL # BLD AUTO: 0 K/UL — SIGNIFICANT CHANGE UP (ref 0–0.5)
EOSINOPHIL NFR BLD AUTO: 0 % — SIGNIFICANT CHANGE UP (ref 0–6)
GLUCOSE SERPL-MCNC: 253 MG/DL — HIGH (ref 70–99)
HCT VFR BLD CALC: 38.2 % — SIGNIFICANT CHANGE UP (ref 34.5–45)
HGB BLD-MCNC: 12 G/DL — SIGNIFICANT CHANGE UP (ref 11.5–15.5)
LYMPHOCYTES # BLD AUTO: 0.7 K/UL — LOW (ref 1–3.3)
LYMPHOCYTES # BLD AUTO: 6 % — LOW (ref 13–44)
MAGNESIUM SERPL-MCNC: 2.7 MG/DL — HIGH (ref 1.8–2.4)
MCHC RBC-ENTMCNC: 26.4 PG — LOW (ref 27–34)
MCHC RBC-ENTMCNC: 31.4 GM/DL — LOW (ref 32–36)
MCV RBC AUTO: 84.1 FL — SIGNIFICANT CHANGE UP (ref 80–100)
MONOCYTES # BLD AUTO: 0.7 K/UL — SIGNIFICANT CHANGE UP (ref 0–0.9)
MONOCYTES NFR BLD AUTO: 6.1 % — SIGNIFICANT CHANGE UP (ref 2–14)
NEUTROPHILS # BLD AUTO: 10.6 K/UL — HIGH (ref 1.8–7.4)
NEUTROPHILS NFR BLD AUTO: 87.7 % — HIGH (ref 43–77)
PHOSPHATE SERPL-MCNC: 2.5 MG/DL — SIGNIFICANT CHANGE UP (ref 2.5–4.5)
PLATELET # BLD AUTO: 248 K/UL — SIGNIFICANT CHANGE UP (ref 150–400)
POTASSIUM SERPL-MCNC: 3.6 MMOL/L — SIGNIFICANT CHANGE UP (ref 3.5–5.3)
POTASSIUM SERPL-SCNC: 3.6 MMOL/L — SIGNIFICANT CHANGE UP (ref 3.5–5.3)
PROT SERPL-MCNC: 7.7 G/DL — SIGNIFICANT CHANGE UP (ref 6–8.3)
RBC # BLD: 4.54 M/UL — SIGNIFICANT CHANGE UP (ref 3.8–5.2)
RBC # FLD: 15.4 % — HIGH (ref 10.3–14.5)
SODIUM SERPL-SCNC: 144 MMOL/L — SIGNIFICANT CHANGE UP (ref 135–145)
SPECIMEN SOURCE: SIGNIFICANT CHANGE UP
WBC # BLD: 12.2 K/UL — HIGH (ref 3.8–10.5)
WBC # FLD AUTO: 12.2 K/UL — HIGH (ref 3.8–10.5)

## 2017-04-01 PROCEDURE — 93970 EXTREMITY STUDY: CPT | Mod: 26

## 2017-04-01 PROCEDURE — 71010: CPT | Mod: 26

## 2017-04-01 RX ORDER — VANCOMYCIN HCL 1 G
1000 VIAL (EA) INTRAVENOUS ONCE
Refills: 0 | Status: COMPLETED | OUTPATIENT
Start: 2017-04-01 | End: 2017-04-01

## 2017-04-01 RX ORDER — VANCOMYCIN HCL 1 G
1250 VIAL (EA) INTRAVENOUS EVERY 12 HOURS
Refills: 0 | Status: DISCONTINUED | OUTPATIENT
Start: 2017-04-01 | End: 2017-04-04

## 2017-04-01 RX ORDER — POTASSIUM CHLORIDE 20 MEQ
40 PACKET (EA) ORAL ONCE
Refills: 0 | Status: COMPLETED | OUTPATIENT
Start: 2017-04-01 | End: 2017-04-01

## 2017-04-01 RX ORDER — IPRATROPIUM/ALBUTEROL SULFATE 18-103MCG
3 AEROSOL WITH ADAPTER (GRAM) INHALATION ONCE
Refills: 0 | Status: COMPLETED | OUTPATIENT
Start: 2017-04-01 | End: 2017-04-01

## 2017-04-01 RX ORDER — SIMETHICONE 80 MG/1
80 TABLET, CHEWABLE ORAL ONCE
Refills: 0 | Status: COMPLETED | OUTPATIENT
Start: 2017-04-01 | End: 2017-04-01

## 2017-04-01 RX ORDER — POTASSIUM CHLORIDE 20 MEQ
40 PACKET (EA) ORAL ONCE
Refills: 0 | Status: DISCONTINUED | OUTPATIENT
Start: 2017-04-01 | End: 2017-04-01

## 2017-04-01 RX ADMIN — Medication 40 MILLIGRAM(S): at 21:42

## 2017-04-01 RX ADMIN — Medication 40 MILLIGRAM(S): at 06:25

## 2017-04-01 RX ADMIN — Medication 3 MILLILITER(S): at 07:50

## 2017-04-01 RX ADMIN — OLANZAPINE 10 MILLIGRAM(S): 15 TABLET, FILM COATED ORAL at 11:23

## 2017-04-01 RX ADMIN — Medication 3 MILLILITER(S): at 02:41

## 2017-04-01 RX ADMIN — Medication 40 MILLIGRAM(S): at 06:24

## 2017-04-01 RX ADMIN — MONTELUKAST 10 MILLIGRAM(S): 4 TABLET, CHEWABLE ORAL at 11:24

## 2017-04-01 RX ADMIN — Medication 75 MICROGRAM(S): at 06:24

## 2017-04-01 RX ADMIN — LOSARTAN POTASSIUM 100 MILLIGRAM(S): 100 TABLET, FILM COATED ORAL at 06:25

## 2017-04-01 RX ADMIN — Medication 4: at 06:26

## 2017-04-01 RX ADMIN — Medication 81 MILLIGRAM(S): at 11:23

## 2017-04-01 RX ADMIN — Medication 40 MILLIGRAM(S): at 18:09

## 2017-04-01 RX ADMIN — Medication 250 MILLIGRAM(S): at 11:22

## 2017-04-01 RX ADMIN — ATORVASTATIN CALCIUM 20 MILLIGRAM(S): 80 TABLET, FILM COATED ORAL at 21:42

## 2017-04-01 RX ADMIN — Medication 3 MILLILITER(S): at 20:31

## 2017-04-01 RX ADMIN — Medication 3 MILLILITER(S): at 09:58

## 2017-04-01 RX ADMIN — SIMETHICONE 80 MILLIGRAM(S): 80 TABLET, CHEWABLE ORAL at 14:33

## 2017-04-01 RX ADMIN — Medication 3 MILLILITER(S): at 14:26

## 2017-04-01 RX ADMIN — ENOXAPARIN SODIUM 40 MILLIGRAM(S): 100 INJECTION SUBCUTANEOUS at 11:22

## 2017-04-01 RX ADMIN — Medication 40 MILLIEQUIVALENT(S): at 11:23

## 2017-04-01 RX ADMIN — Medication 166.67 MILLIGRAM(S): at 18:14

## 2017-04-01 RX ADMIN — Medication 6: at 11:34

## 2017-04-01 RX ADMIN — Medication 6: at 18:10

## 2017-04-01 RX ADMIN — Medication 40 MILLIGRAM(S): at 14:33

## 2017-04-02 LAB
ALBUMIN SERPL ELPH-MCNC: 3.4 G/DL — LOW (ref 3.5–5)
ALP SERPL-CCNC: 63 U/L — SIGNIFICANT CHANGE UP (ref 40–120)
ALT FLD-CCNC: 22 U/L DA — SIGNIFICANT CHANGE UP (ref 10–60)
ANION GAP SERPL CALC-SCNC: 11 MMOL/L — SIGNIFICANT CHANGE UP (ref 5–17)
AST SERPL-CCNC: 14 U/L — SIGNIFICANT CHANGE UP (ref 10–40)
BILIRUB SERPL-MCNC: 0.5 MG/DL — SIGNIFICANT CHANGE UP (ref 0.2–1.2)
BUN SERPL-MCNC: 24 MG/DL — HIGH (ref 7–18)
CALCIUM SERPL-MCNC: 9.4 MG/DL — SIGNIFICANT CHANGE UP (ref 8.4–10.5)
CHLORIDE SERPL-SCNC: 90 MMOL/L — LOW (ref 96–108)
CO2 SERPL-SCNC: 41 MMOL/L — HIGH (ref 22–31)
CREAT SERPL-MCNC: 0.93 MG/DL — SIGNIFICANT CHANGE UP (ref 0.5–1.3)
GLUCOSE SERPL-MCNC: 267 MG/DL — HIGH (ref 70–99)
HCT VFR BLD CALC: 39.5 % — SIGNIFICANT CHANGE UP (ref 34.5–45)
HGB BLD-MCNC: 12.4 G/DL — SIGNIFICANT CHANGE UP (ref 11.5–15.5)
MAGNESIUM SERPL-MCNC: 2.5 MG/DL — HIGH (ref 1.8–2.4)
MCHC RBC-ENTMCNC: 26.3 PG — LOW (ref 27–34)
MCHC RBC-ENTMCNC: 31.3 GM/DL — LOW (ref 32–36)
MCV RBC AUTO: 83.9 FL — SIGNIFICANT CHANGE UP (ref 80–100)
PHOSPHATE SERPL-MCNC: 3.1 MG/DL — SIGNIFICANT CHANGE UP (ref 2.5–4.5)
PLATELET # BLD AUTO: 233 K/UL — SIGNIFICANT CHANGE UP (ref 150–400)
POTASSIUM SERPL-MCNC: 3.7 MMOL/L — SIGNIFICANT CHANGE UP (ref 3.5–5.3)
POTASSIUM SERPL-SCNC: 3.7 MMOL/L — SIGNIFICANT CHANGE UP (ref 3.5–5.3)
PROT SERPL-MCNC: 7.6 G/DL — SIGNIFICANT CHANGE UP (ref 6–8.3)
RBC # BLD: 4.7 M/UL — SIGNIFICANT CHANGE UP (ref 3.8–5.2)
RBC # FLD: 15.4 % — HIGH (ref 10.3–14.5)
SODIUM SERPL-SCNC: 142 MMOL/L — SIGNIFICANT CHANGE UP (ref 135–145)
VANCOMYCIN TROUGH SERPL-MCNC: 16.3 UG/ML — SIGNIFICANT CHANGE UP (ref 10–20)
VANCOMYCIN TROUGH SERPL-MCNC: 24 UG/ML — HIGH (ref 10–20)
WBC # BLD: 12.3 K/UL — HIGH (ref 3.8–10.5)
WBC # FLD AUTO: 12.3 K/UL — HIGH (ref 3.8–10.5)

## 2017-04-02 PROCEDURE — 71010: CPT | Mod: 26

## 2017-04-02 RX ORDER — MORPHINE SULFATE 50 MG/1
4 CAPSULE, EXTENDED RELEASE ORAL ONCE
Refills: 0 | Status: DISCONTINUED | OUTPATIENT
Start: 2017-04-02 | End: 2017-04-02

## 2017-04-02 RX ADMIN — Medication 6: at 11:51

## 2017-04-02 RX ADMIN — Medication 166.67 MILLIGRAM(S): at 17:02

## 2017-04-02 RX ADMIN — OLANZAPINE 10 MILLIGRAM(S): 15 TABLET, FILM COATED ORAL at 11:47

## 2017-04-02 RX ADMIN — Medication 3 MILLILITER(S): at 16:14

## 2017-04-02 RX ADMIN — MORPHINE SULFATE 4 MILLIGRAM(S): 50 CAPSULE, EXTENDED RELEASE ORAL at 11:13

## 2017-04-02 RX ADMIN — Medication 4: at 17:02

## 2017-04-02 RX ADMIN — Medication 40 MILLIGRAM(S): at 17:02

## 2017-04-02 RX ADMIN — MONTELUKAST 10 MILLIGRAM(S): 4 TABLET, CHEWABLE ORAL at 11:47

## 2017-04-02 RX ADMIN — Medication 6: at 05:44

## 2017-04-02 RX ADMIN — LOSARTAN POTASSIUM 100 MILLIGRAM(S): 100 TABLET, FILM COATED ORAL at 05:43

## 2017-04-02 RX ADMIN — ENOXAPARIN SODIUM 40 MILLIGRAM(S): 100 INJECTION SUBCUTANEOUS at 11:47

## 2017-04-02 RX ADMIN — Medication 40 MILLIGRAM(S): at 05:43

## 2017-04-02 RX ADMIN — Medication 81 MILLIGRAM(S): at 11:47

## 2017-04-02 RX ADMIN — Medication 3 MILLILITER(S): at 09:18

## 2017-04-02 RX ADMIN — Medication 40 MILLIGRAM(S): at 13:23

## 2017-04-02 RX ADMIN — Medication 75 MICROGRAM(S): at 05:42

## 2017-04-02 RX ADMIN — Medication 40 MILLIGRAM(S): at 22:39

## 2017-04-02 RX ADMIN — Medication 3 MILLILITER(S): at 20:21

## 2017-04-02 RX ADMIN — ATORVASTATIN CALCIUM 20 MILLIGRAM(S): 80 TABLET, FILM COATED ORAL at 22:39

## 2017-04-02 RX ADMIN — MORPHINE SULFATE 4 MILLIGRAM(S): 50 CAPSULE, EXTENDED RELEASE ORAL at 11:45

## 2017-04-02 RX ADMIN — Medication 3 MILLILITER(S): at 02:56

## 2017-04-03 LAB
ALBUMIN SERPL ELPH-MCNC: 3.6 G/DL — SIGNIFICANT CHANGE UP (ref 3.5–5)
ALP SERPL-CCNC: 63 U/L — SIGNIFICANT CHANGE UP (ref 40–120)
ALT FLD-CCNC: 27 U/L DA — SIGNIFICANT CHANGE UP (ref 10–60)
ANION GAP SERPL CALC-SCNC: 11 MMOL/L — SIGNIFICANT CHANGE UP (ref 5–17)
AST SERPL-CCNC: 14 U/L — SIGNIFICANT CHANGE UP (ref 10–40)
BILIRUB SERPL-MCNC: 0.6 MG/DL — SIGNIFICANT CHANGE UP (ref 0.2–1.2)
BUN SERPL-MCNC: 41 MG/DL — HIGH (ref 7–18)
CALCIUM SERPL-MCNC: 9.2 MG/DL — SIGNIFICANT CHANGE UP (ref 8.4–10.5)
CHLORIDE SERPL-SCNC: 84 MMOL/L — LOW (ref 96–108)
CO2 SERPL-SCNC: 41 MMOL/L — HIGH (ref 22–31)
CREAT SERPL-MCNC: 1.12 MG/DL — SIGNIFICANT CHANGE UP (ref 0.5–1.3)
GLUCOSE SERPL-MCNC: 313 MG/DL — HIGH (ref 70–99)
HCT VFR BLD CALC: 42.6 % — SIGNIFICANT CHANGE UP (ref 34.5–45)
HGB BLD-MCNC: 13.7 G/DL — SIGNIFICANT CHANGE UP (ref 11.5–15.5)
INR BLD: 1.08 RATIO — SIGNIFICANT CHANGE UP (ref 0.88–1.16)
MAGNESIUM SERPL-MCNC: 2.5 MG/DL — HIGH (ref 1.8–2.4)
MCHC RBC-ENTMCNC: 26.9 PG — LOW (ref 27–34)
MCHC RBC-ENTMCNC: 32.1 GM/DL — SIGNIFICANT CHANGE UP (ref 32–36)
MCV RBC AUTO: 83.8 FL — SIGNIFICANT CHANGE UP (ref 80–100)
PHOSPHATE SERPL-MCNC: 3.4 MG/DL — SIGNIFICANT CHANGE UP (ref 2.5–4.5)
PLATELET # BLD AUTO: 234 K/UL — SIGNIFICANT CHANGE UP (ref 150–400)
POTASSIUM SERPL-MCNC: 3.3 MMOL/L — LOW (ref 3.5–5.3)
POTASSIUM SERPL-SCNC: 3.3 MMOL/L — LOW (ref 3.5–5.3)
PROT SERPL-MCNC: 7.8 G/DL — SIGNIFICANT CHANGE UP (ref 6–8.3)
PROTHROM AB SERPL-ACNC: 11.8 SEC — SIGNIFICANT CHANGE UP (ref 9.8–12.7)
RBC # BLD: 5.08 M/UL — SIGNIFICANT CHANGE UP (ref 3.8–5.2)
RBC # FLD: 14.9 % — HIGH (ref 10.3–14.5)
SODIUM SERPL-SCNC: 136 MMOL/L — SIGNIFICANT CHANGE UP (ref 135–145)
WBC # BLD: 17.1 K/UL — HIGH (ref 3.8–10.5)
WBC # FLD AUTO: 17.1 K/UL — HIGH (ref 3.8–10.5)

## 2017-04-03 PROCEDURE — 71010: CPT | Mod: 26

## 2017-04-03 RX ORDER — TIOTROPIUM BROMIDE 18 UG/1
1 CAPSULE ORAL; RESPIRATORY (INHALATION) DAILY
Refills: 0 | Status: DISCONTINUED | OUTPATIENT
Start: 2017-04-03 | End: 2017-04-05

## 2017-04-03 RX ORDER — POTASSIUM CHLORIDE 20 MEQ
40 PACKET (EA) ORAL ONCE
Refills: 0 | Status: COMPLETED | OUTPATIENT
Start: 2017-04-03 | End: 2017-04-03

## 2017-04-03 RX ORDER — DOCUSATE SODIUM 100 MG
100 CAPSULE ORAL THREE TIMES A DAY
Refills: 0 | Status: DISCONTINUED | OUTPATIENT
Start: 2017-04-03 | End: 2017-04-05

## 2017-04-03 RX ORDER — FUROSEMIDE 40 MG
60 TABLET ORAL DAILY
Refills: 0 | Status: DISCONTINUED | OUTPATIENT
Start: 2017-04-04 | End: 2017-04-04

## 2017-04-03 RX ORDER — BUDESONIDE AND FORMOTEROL FUMARATE DIHYDRATE 160; 4.5 UG/1; UG/1
2 AEROSOL RESPIRATORY (INHALATION)
Refills: 0 | Status: DISCONTINUED | OUTPATIENT
Start: 2017-04-03 | End: 2017-04-05

## 2017-04-03 RX ORDER — SENNA PLUS 8.6 MG/1
2 TABLET ORAL AT BEDTIME
Refills: 0 | Status: DISCONTINUED | OUTPATIENT
Start: 2017-04-03 | End: 2017-04-05

## 2017-04-03 RX ORDER — INSULIN GLARGINE 100 [IU]/ML
10 INJECTION, SOLUTION SUBCUTANEOUS AT BEDTIME
Refills: 0 | Status: DISCONTINUED | OUTPATIENT
Start: 2017-04-03 | End: 2017-04-05

## 2017-04-03 RX ADMIN — OLANZAPINE 10 MILLIGRAM(S): 15 TABLET, FILM COATED ORAL at 12:18

## 2017-04-03 RX ADMIN — TIOTROPIUM BROMIDE 1 CAPSULE(S): 18 CAPSULE ORAL; RESPIRATORY (INHALATION) at 22:22

## 2017-04-03 RX ADMIN — Medication 3 MILLILITER(S): at 20:07

## 2017-04-03 RX ADMIN — Medication 6: at 17:05

## 2017-04-03 RX ADMIN — Medication 40 MILLIGRAM(S): at 05:22

## 2017-04-03 RX ADMIN — Medication 10: at 08:43

## 2017-04-03 RX ADMIN — Medication 3 MILLILITER(S): at 02:52

## 2017-04-03 RX ADMIN — INSULIN GLARGINE 10 UNIT(S): 100 INJECTION, SOLUTION SUBCUTANEOUS at 22:21

## 2017-04-03 RX ADMIN — ENOXAPARIN SODIUM 40 MILLIGRAM(S): 100 INJECTION SUBCUTANEOUS at 12:19

## 2017-04-03 RX ADMIN — Medication 166.67 MILLIGRAM(S): at 17:05

## 2017-04-03 RX ADMIN — Medication 8: at 12:14

## 2017-04-03 RX ADMIN — Medication 10 MILLIGRAM(S): at 17:05

## 2017-04-03 RX ADMIN — Medication 75 MICROGRAM(S): at 05:22

## 2017-04-03 RX ADMIN — LOSARTAN POTASSIUM 100 MILLIGRAM(S): 100 TABLET, FILM COATED ORAL at 05:22

## 2017-04-03 RX ADMIN — MONTELUKAST 10 MILLIGRAM(S): 4 TABLET, CHEWABLE ORAL at 12:17

## 2017-04-03 RX ADMIN — Medication 100 MILLIGRAM(S): at 22:21

## 2017-04-03 RX ADMIN — Medication 3 MILLILITER(S): at 08:15

## 2017-04-03 RX ADMIN — Medication 166.67 MILLIGRAM(S): at 05:22

## 2017-04-03 RX ADMIN — ATORVASTATIN CALCIUM 20 MILLIGRAM(S): 80 TABLET, FILM COATED ORAL at 22:21

## 2017-04-03 RX ADMIN — Medication 81 MILLIGRAM(S): at 12:18

## 2017-04-03 RX ADMIN — SENNA PLUS 2 TABLET(S): 8.6 TABLET ORAL at 22:20

## 2017-04-03 RX ADMIN — Medication 40 MILLIEQUIVALENT(S): at 12:19

## 2017-04-03 RX ADMIN — Medication 3 MILLILITER(S): at 14:14

## 2017-04-04 LAB
ANION GAP SERPL CALC-SCNC: 8 MMOL/L — SIGNIFICANT CHANGE UP (ref 5–17)
ANION GAP SERPL CALC-SCNC: 9 MMOL/L — SIGNIFICANT CHANGE UP (ref 5–17)
BUN SERPL-MCNC: 44 MG/DL — HIGH (ref 7–18)
BUN SERPL-MCNC: 46 MG/DL — HIGH (ref 7–18)
CALCIUM SERPL-MCNC: 8.4 MG/DL — SIGNIFICANT CHANGE UP (ref 8.4–10.5)
CALCIUM SERPL-MCNC: 8.6 MG/DL — SIGNIFICANT CHANGE UP (ref 8.4–10.5)
CHLORIDE SERPL-SCNC: 83 MMOL/L — LOW (ref 96–108)
CHLORIDE SERPL-SCNC: 85 MMOL/L — LOW (ref 96–108)
CO2 SERPL-SCNC: 35 MMOL/L — HIGH (ref 22–31)
CO2 SERPL-SCNC: 36 MMOL/L — HIGH (ref 22–31)
CREAT SERPL-MCNC: 1.03 MG/DL — SIGNIFICANT CHANGE UP (ref 0.5–1.3)
CREAT SERPL-MCNC: 1.08 MG/DL — SIGNIFICANT CHANGE UP (ref 0.5–1.3)
CULTURE RESULTS: SIGNIFICANT CHANGE UP
GLUCOSE SERPL-MCNC: 203 MG/DL — HIGH (ref 70–99)
GLUCOSE SERPL-MCNC: 236 MG/DL — HIGH (ref 70–99)
HCT VFR BLD CALC: 38.6 % — SIGNIFICANT CHANGE UP (ref 34.5–45)
HGB BLD-MCNC: 12.4 G/DL — SIGNIFICANT CHANGE UP (ref 11.5–15.5)
MCHC RBC-ENTMCNC: 26.4 PG — LOW (ref 27–34)
MCHC RBC-ENTMCNC: 32 GM/DL — SIGNIFICANT CHANGE UP (ref 32–36)
MCV RBC AUTO: 82.4 FL — SIGNIFICANT CHANGE UP (ref 80–100)
PLATELET # BLD AUTO: 206 K/UL — SIGNIFICANT CHANGE UP (ref 150–400)
POTASSIUM SERPL-MCNC: 3.3 MMOL/L — LOW (ref 3.5–5.3)
POTASSIUM SERPL-MCNC: 3.4 MMOL/L — LOW (ref 3.5–5.3)
POTASSIUM SERPL-SCNC: 3.3 MMOL/L — LOW (ref 3.5–5.3)
POTASSIUM SERPL-SCNC: 3.4 MMOL/L — LOW (ref 3.5–5.3)
RBC # BLD: 4.69 M/UL — SIGNIFICANT CHANGE UP (ref 3.8–5.2)
RBC # FLD: 14.8 % — HIGH (ref 10.3–14.5)
SODIUM SERPL-SCNC: 127 MMOL/L — LOW (ref 135–145)
SODIUM SERPL-SCNC: 129 MMOL/L — LOW (ref 135–145)
SPECIMEN SOURCE: SIGNIFICANT CHANGE UP
VANCOMYCIN TROUGH SERPL-MCNC: 26.5 UG/ML — CRITICAL HIGH (ref 10–20)
WBC # BLD: 21 K/UL — HIGH (ref 3.8–10.5)
WBC # FLD AUTO: 21 K/UL — HIGH (ref 3.8–10.5)

## 2017-04-04 PROCEDURE — 73502 X-RAY EXAM HIP UNI 2-3 VIEWS: CPT | Mod: 26,LT

## 2017-04-04 RX ORDER — POTASSIUM CHLORIDE 20 MEQ
40 PACKET (EA) ORAL EVERY 4 HOURS
Refills: 0 | Status: COMPLETED | OUTPATIENT
Start: 2017-04-04 | End: 2017-04-04

## 2017-04-04 RX ADMIN — ENOXAPARIN SODIUM 40 MILLIGRAM(S): 100 INJECTION SUBCUTANEOUS at 11:59

## 2017-04-04 RX ADMIN — Medication 2: at 17:20

## 2017-04-04 RX ADMIN — Medication 40 MILLIEQUIVALENT(S): at 13:57

## 2017-04-04 RX ADMIN — Medication 200 MILLIGRAM(S): at 11:59

## 2017-04-04 RX ADMIN — BUDESONIDE AND FORMOTEROL FUMARATE DIHYDRATE 2 PUFF(S): 160; 4.5 AEROSOL RESPIRATORY (INHALATION) at 09:24

## 2017-04-04 RX ADMIN — Medication 60 MILLIGRAM(S): at 05:36

## 2017-04-04 RX ADMIN — LOSARTAN POTASSIUM 100 MILLIGRAM(S): 100 TABLET, FILM COATED ORAL at 05:36

## 2017-04-04 RX ADMIN — TIOTROPIUM BROMIDE 1 CAPSULE(S): 18 CAPSULE ORAL; RESPIRATORY (INHALATION) at 11:59

## 2017-04-04 RX ADMIN — Medication 3 MILLILITER(S): at 14:25

## 2017-04-04 RX ADMIN — Medication 40 MILLIEQUIVALENT(S): at 17:20

## 2017-04-04 RX ADMIN — Medication 30 MILLIGRAM(S): at 13:58

## 2017-04-04 RX ADMIN — Medication 100 MILLIGRAM(S): at 13:58

## 2017-04-04 RX ADMIN — Medication 4: at 07:54

## 2017-04-04 RX ADMIN — SENNA PLUS 2 TABLET(S): 8.6 TABLET ORAL at 22:27

## 2017-04-04 RX ADMIN — INSULIN GLARGINE 10 UNIT(S): 100 INJECTION, SOLUTION SUBCUTANEOUS at 22:27

## 2017-04-04 RX ADMIN — Medication 200 MILLIGRAM(S): at 17:20

## 2017-04-04 RX ADMIN — Medication 3 MILLILITER(S): at 02:39

## 2017-04-04 RX ADMIN — Medication 100 MILLIGRAM(S): at 22:27

## 2017-04-04 RX ADMIN — Medication 75 MICROGRAM(S): at 05:36

## 2017-04-04 RX ADMIN — MONTELUKAST 10 MILLIGRAM(S): 4 TABLET, CHEWABLE ORAL at 11:58

## 2017-04-04 RX ADMIN — Medication 3 MILLILITER(S): at 20:27

## 2017-04-04 RX ADMIN — Medication 200 MILLIGRAM(S): at 22:36

## 2017-04-04 RX ADMIN — Medication 81 MILLIGRAM(S): at 11:58

## 2017-04-04 RX ADMIN — ATORVASTATIN CALCIUM 20 MILLIGRAM(S): 80 TABLET, FILM COATED ORAL at 22:27

## 2017-04-04 RX ADMIN — Medication 2: at 11:58

## 2017-04-04 RX ADMIN — Medication 100 MILLIGRAM(S): at 05:36

## 2017-04-04 RX ADMIN — Medication 3 MILLILITER(S): at 08:58

## 2017-04-04 RX ADMIN — BUDESONIDE AND FORMOTEROL FUMARATE DIHYDRATE 2 PUFF(S): 160; 4.5 AEROSOL RESPIRATORY (INHALATION) at 22:28

## 2017-04-04 RX ADMIN — OLANZAPINE 10 MILLIGRAM(S): 15 TABLET, FILM COATED ORAL at 11:58

## 2017-04-04 NOTE — DISCHARGE NOTE ADULT - SECONDARY DIAGNOSIS.
Chronic obstructive pulmonary disease, unspecified COPD type Chronic diastolic congestive heart failure Cellulitis Hypothyroidism Schizo affective schizophrenia

## 2017-04-04 NOTE — DISCHARGE NOTE ADULT - PATIENT PORTAL LINK FT
“You can access the Follow[facility] Patient Portal, offered by [facility], by registering with the following website: [email or web address]”

## 2017-04-04 NOTE — DISCHARGE NOTE ADULT - HOSPITAL COURSE
71 female with schizoaffective disorder, asthma/copd, diastolic CHF, morbid obesity, presents with acute respiratory failure requiring bipap, wheezing on exam, evidence of fluid overload on CXR and physical exam.  Admit with acute on chronic diastolic CHF exacerbation, RVP was + for human metapneumovirus. Blood cultures were negative. Treated with bipap, diuretics, and ceftriaxone for possible pneumonia and bilat lower extremity cellulitis. 71 female with schizoaffective disorder, asthma/copd, diastolic CHF, morbid obesity, presents with acute respiratory failure requiring bipap, wheezing on exam, evidence of fluid overload on CXR and physical exam.  Admit with acute on chronic diastolic CHF exacerbation, RVP was + for human metapneumovirus. Blood cultures were negative. Treated with bipap, diuretics, and ceftriaxone for possible pneumonia and bilat lower extremity cellulitis.  Seen by ortho for left hip pain and conservative measures recommended as patient not a surgical candidate. Please see chart for full account of this hospitalization as this is a brief summary.

## 2017-04-04 NOTE — DISCHARGE NOTE ADULT - PLAN OF CARE
prevent reoccurrence continue with supplemental 02 and bipap as needed Call your Health Care provider upon arrival home to make a follow up appointment within one week.  Take all inhalers as prescribed by your Health Care Provider.  Take steroids as prescribed by your Health Care Provider.  If your cough increases infrequency and severity and/or you have shortness of breath or increased shortness of breath call your Health Care Provider.  If you develop fever, chills, night sweats, malaise, and/or change in mental status call your Health care Provider.  Nutrition is very important.  Eat small frequent meals.  Increase your activity as tolerated.  Do not stay in bed all day Weigh yourself daily.  If you gain 3lbs in 3 days, or 5lbs in a week call your Health Care Provider.  Do not eat or drink foods containing more than 2000mg of salt (sodium) in your diet every day.  Call your Health Care Provider if you have any swelling or increased swelling in your feet, ankles, and/or stomach.  Take all of your medication as directed.  If you become dizzy call your Health Care Provider. you do not make enough thyroid hormone  signs & symptoms of low levels of thyroid hormone - tired, getting cold easily, coarse or thin hair, constipation, shortness of breath, swelling, irregular periods  your doctor will do thyroid hormone blood tests at least once a year to monitor if medication dose is adequate  take your thyroid medicine as directed by your doctor & on empty stomach continue with current medication treatment Take all of your antibiotics as ordered.  Call your Health Care Provider within two days of arriving home to make a follow up appointment within one week.  If the affected cellulitic area increases in redness, warmth, pain or swelling call your Health Care Provider.  If you develop fever, chills, and/or malaise, call your Health Care Provider. continue with supplemental 02 and bipap as tolerated; patient has been refusing continue with supplemental 02 and bipap as tolerated; patient has been refusing  IPAP 16  CPAP 6 40%

## 2017-04-04 NOTE — DISCHARGE NOTE ADULT - CARE PLAN
Principal Discharge DX:	Acute respiratory failure with hypoxia and hypercapnia  Goal:	prevent reoccurrence  Instructions for follow-up, activity and diet:	continue with supplemental 02 and bipap as needed  Secondary Diagnosis:	Chronic obstructive pulmonary disease, unspecified COPD type  Instructions for follow-up, activity and diet:	Call your Health Care provider upon arrival home to make a follow up appointment within one week.  Take all inhalers as prescribed by your Health Care Provider.  Take steroids as prescribed by your Health Care Provider.  If your cough increases infrequency and severity and/or you have shortness of breath or increased shortness of breath call your Health Care Provider.  If you develop fever, chills, night sweats, malaise, and/or change in mental status call your Health care Provider.  Nutrition is very important.  Eat small frequent meals.  Increase your activity as tolerated.  Do not stay in bed all day  Secondary Diagnosis:	Chronic diastolic congestive heart failure  Instructions for follow-up, activity and diet:	Weigh yourself daily.  If you gain 3lbs in 3 days, or 5lbs in a week call your Health Care Provider.  Do not eat or drink foods containing more than 2000mg of salt (sodium) in your diet every day.  Call your Health Care Provider if you have any swelling or increased swelling in your feet, ankles, and/or stomach.  Take all of your medication as directed.  If you become dizzy call your Health Care Provider.  Secondary Diagnosis:	Cellulitis  Instructions for follow-up, activity and diet:	Take all of your antibiotics as ordered.  Call your Health Care Provider within two days of arriving home to make a follow up appointment within one week.  If the affected cellulitic area increases in redness, warmth, pain or swelling call your Health Care Provider.  If you develop fever, chills, and/or malaise, call your Health Care Provider.  Secondary Diagnosis:	Hypothyroidism  Instructions for follow-up, activity and diet:	you do not make enough thyroid hormone  signs & symptoms of low levels of thyroid hormone - tired, getting cold easily, coarse or thin hair, constipation, shortness of breath, swelling, irregular periods  your doctor will do thyroid hormone blood tests at least once a year to monitor if medication dose is adequate  take your thyroid medicine as directed by your doctor & on empty stomach  Secondary Diagnosis:	Schizo affective schizophrenia  Instructions for follow-up, activity and diet:	continue with current medication treatment Principal Discharge DX:	Acute respiratory failure with hypoxia and hypercapnia  Goal:	prevent reoccurrence  Instructions for follow-up, activity and diet:	continue with supplemental 02 and bipap as tolerated; patient has been refusing  Secondary Diagnosis:	Chronic obstructive pulmonary disease, unspecified COPD type  Instructions for follow-up, activity and diet:	Call your Health Care provider upon arrival home to make a follow up appointment within one week.  Take all inhalers as prescribed by your Health Care Provider.  Take steroids as prescribed by your Health Care Provider.  If your cough increases infrequency and severity and/or you have shortness of breath or increased shortness of breath call your Health Care Provider.  If you develop fever, chills, night sweats, malaise, and/or change in mental status call your Health care Provider.  Nutrition is very important.  Eat small frequent meals.  Increase your activity as tolerated.  Do not stay in bed all day  Secondary Diagnosis:	Chronic diastolic congestive heart failure  Instructions for follow-up, activity and diet:	Weigh yourself daily.  If you gain 3lbs in 3 days, or 5lbs in a week call your Health Care Provider.  Do not eat or drink foods containing more than 2000mg of salt (sodium) in your diet every day.  Call your Health Care Provider if you have any swelling or increased swelling in your feet, ankles, and/or stomach.  Take all of your medication as directed.  If you become dizzy call your Health Care Provider.  Secondary Diagnosis:	Cellulitis  Instructions for follow-up, activity and diet:	Take all of your antibiotics as ordered.  Call your Health Care Provider within two days of arriving home to make a follow up appointment within one week.  If the affected cellulitic area increases in redness, warmth, pain or swelling call your Health Care Provider.  If you develop fever, chills, and/or malaise, call your Health Care Provider.  Secondary Diagnosis:	Hypothyroidism  Instructions for follow-up, activity and diet:	you do not make enough thyroid hormone  signs & symptoms of low levels of thyroid hormone - tired, getting cold easily, coarse or thin hair, constipation, shortness of breath, swelling, irregular periods  your doctor will do thyroid hormone blood tests at least once a year to monitor if medication dose is adequate  take your thyroid medicine as directed by your doctor & on empty stomach  Secondary Diagnosis:	Schizo affective schizophrenia  Instructions for follow-up, activity and diet:	continue with current medication treatment Principal Discharge DX:	Acute respiratory failure with hypoxia and hypercapnia  Goal:	prevent reoccurrence  Instructions for follow-up, activity and diet:	continue with supplemental 02 and bipap as tolerated; patient has been refusing  IPAP 16  CPAP 6 40%  Secondary Diagnosis:	Chronic obstructive pulmonary disease, unspecified COPD type  Instructions for follow-up, activity and diet:	Call your Health Care provider upon arrival home to make a follow up appointment within one week.  Take all inhalers as prescribed by your Health Care Provider.  Take steroids as prescribed by your Health Care Provider.  If your cough increases infrequency and severity and/or you have shortness of breath or increased shortness of breath call your Health Care Provider.  If you develop fever, chills, night sweats, malaise, and/or change in mental status call your Health care Provider.  Nutrition is very important.  Eat small frequent meals.  Increase your activity as tolerated.  Do not stay in bed all day  Secondary Diagnosis:	Chronic diastolic congestive heart failure  Instructions for follow-up, activity and diet:	Weigh yourself daily.  If you gain 3lbs in 3 days, or 5lbs in a week call your Health Care Provider.  Do not eat or drink foods containing more than 2000mg of salt (sodium) in your diet every day.  Call your Health Care Provider if you have any swelling or increased swelling in your feet, ankles, and/or stomach.  Take all of your medication as directed.  If you become dizzy call your Health Care Provider.  Secondary Diagnosis:	Cellulitis  Instructions for follow-up, activity and diet:	Take all of your antibiotics as ordered.  Call your Health Care Provider within two days of arriving home to make a follow up appointment within one week.  If the affected cellulitic area increases in redness, warmth, pain or swelling call your Health Care Provider.  If you develop fever, chills, and/or malaise, call your Health Care Provider.  Secondary Diagnosis:	Hypothyroidism  Instructions for follow-up, activity and diet:	you do not make enough thyroid hormone  signs & symptoms of low levels of thyroid hormone - tired, getting cold easily, coarse or thin hair, constipation, shortness of breath, swelling, irregular periods  your doctor will do thyroid hormone blood tests at least once a year to monitor if medication dose is adequate  take your thyroid medicine as directed by your doctor & on empty stomach  Secondary Diagnosis:	Schizo affective schizophrenia  Instructions for follow-up, activity and diet:	continue with current medication treatment

## 2017-04-05 VITALS — SYSTOLIC BLOOD PRESSURE: 111 MMHG | OXYGEN SATURATION: 96 % | HEART RATE: 103 BPM | DIASTOLIC BLOOD PRESSURE: 63 MMHG

## 2017-04-05 LAB
ANION GAP SERPL CALC-SCNC: 9 MMOL/L — SIGNIFICANT CHANGE UP (ref 5–17)
BUN SERPL-MCNC: 36 MG/DL — HIGH (ref 7–18)
CALCIUM SERPL-MCNC: 8.7 MG/DL — SIGNIFICANT CHANGE UP (ref 8.4–10.5)
CHLORIDE SERPL-SCNC: 90 MMOL/L — LOW (ref 96–108)
CO2 SERPL-SCNC: 37 MMOL/L — HIGH (ref 22–31)
CREAT SERPL-MCNC: 0.95 MG/DL — SIGNIFICANT CHANGE UP (ref 0.5–1.3)
CULTURE RESULTS: SIGNIFICANT CHANGE UP
CULTURE RESULTS: SIGNIFICANT CHANGE UP
GLUCOSE SERPL-MCNC: 205 MG/DL — HIGH (ref 70–99)
HCT VFR BLD CALC: 40.1 % — SIGNIFICANT CHANGE UP (ref 34.5–45)
HGB BLD-MCNC: 12.9 G/DL — SIGNIFICANT CHANGE UP (ref 11.5–15.5)
MAGNESIUM SERPL-MCNC: 2.7 MG/DL — HIGH (ref 1.8–2.4)
MCHC RBC-ENTMCNC: 26.7 PG — LOW (ref 27–34)
MCHC RBC-ENTMCNC: 32.3 GM/DL — SIGNIFICANT CHANGE UP (ref 32–36)
MCV RBC AUTO: 82.7 FL — SIGNIFICANT CHANGE UP (ref 80–100)
PLATELET # BLD AUTO: 207 K/UL — SIGNIFICANT CHANGE UP (ref 150–400)
POTASSIUM SERPL-MCNC: 3.6 MMOL/L — SIGNIFICANT CHANGE UP (ref 3.5–5.3)
POTASSIUM SERPL-SCNC: 3.6 MMOL/L — SIGNIFICANT CHANGE UP (ref 3.5–5.3)
RBC # BLD: 4.84 M/UL — SIGNIFICANT CHANGE UP (ref 3.8–5.2)
RBC # FLD: 14.7 % — HIGH (ref 10.3–14.5)
SODIUM SERPL-SCNC: 136 MMOL/L — SIGNIFICANT CHANGE UP (ref 135–145)
SPECIMEN SOURCE: SIGNIFICANT CHANGE UP
SPECIMEN SOURCE: SIGNIFICANT CHANGE UP
WBC # BLD: 14.4 K/UL — HIGH (ref 3.8–10.5)
WBC # FLD AUTO: 14.4 K/UL — HIGH (ref 3.8–10.5)

## 2017-04-05 PROCEDURE — 84100 ASSAY OF PHOSPHORUS: CPT

## 2017-04-05 PROCEDURE — 85730 THROMBOPLASTIN TIME PARTIAL: CPT

## 2017-04-05 PROCEDURE — 94660 CPAP INITIATION&MGMT: CPT

## 2017-04-05 PROCEDURE — 82550 ASSAY OF CK (CPK): CPT

## 2017-04-05 PROCEDURE — 83735 ASSAY OF MAGNESIUM: CPT

## 2017-04-05 PROCEDURE — 87040 BLOOD CULTURE FOR BACTERIA: CPT

## 2017-04-05 PROCEDURE — 80053 COMPREHEN METABOLIC PANEL: CPT

## 2017-04-05 PROCEDURE — 87486 CHLMYD PNEUM DNA AMP PROBE: CPT

## 2017-04-05 PROCEDURE — 93970 EXTREMITY STUDY: CPT

## 2017-04-05 PROCEDURE — 71045 X-RAY EXAM CHEST 1 VIEW: CPT

## 2017-04-05 PROCEDURE — 97110 THERAPEUTIC EXERCISES: CPT

## 2017-04-05 PROCEDURE — 73502 X-RAY EXAM HIP UNI 2-3 VIEWS: CPT

## 2017-04-05 PROCEDURE — 82553 CREATINE MB FRACTION: CPT

## 2017-04-05 PROCEDURE — 80048 BASIC METABOLIC PNL TOTAL CA: CPT

## 2017-04-05 PROCEDURE — 93306 TTE W/DOPPLER COMPLETE: CPT

## 2017-04-05 PROCEDURE — 83036 HEMOGLOBIN GLYCOSYLATED A1C: CPT

## 2017-04-05 PROCEDURE — 85027 COMPLETE CBC AUTOMATED: CPT

## 2017-04-05 PROCEDURE — 87581 M.PNEUMON DNA AMP PROBE: CPT

## 2017-04-05 PROCEDURE — 94640 AIRWAY INHALATION TREATMENT: CPT

## 2017-04-05 PROCEDURE — 97530 THERAPEUTIC ACTIVITIES: CPT

## 2017-04-05 PROCEDURE — 99291 CRITICAL CARE FIRST HOUR: CPT | Mod: 25

## 2017-04-05 PROCEDURE — 87798 DETECT AGENT NOS DNA AMP: CPT

## 2017-04-05 PROCEDURE — 84484 ASSAY OF TROPONIN QUANT: CPT

## 2017-04-05 PROCEDURE — 80202 ASSAY OF VANCOMYCIN: CPT

## 2017-04-05 PROCEDURE — 87633 RESP VIRUS 12-25 TARGETS: CPT

## 2017-04-05 PROCEDURE — 82803 BLOOD GASES ANY COMBINATION: CPT

## 2017-04-05 PROCEDURE — 85610 PROTHROMBIN TIME: CPT

## 2017-04-05 PROCEDURE — 93005 ELECTROCARDIOGRAM TRACING: CPT

## 2017-04-05 RX ORDER — SOD,AMMONIUM,POTASSIUM LACTATE
1 CREAM (GRAM) TOPICAL
Qty: 0 | Refills: 0 | DISCHARGE
Start: 2017-04-05

## 2017-04-05 RX ORDER — INSULIN LISPRO 100/ML
0 VIAL (ML) SUBCUTANEOUS
Qty: 0 | Refills: 0 | DISCHARGE
Start: 2017-04-05

## 2017-04-05 RX ORDER — TIOTROPIUM BROMIDE 18 UG/1
1 CAPSULE ORAL; RESPIRATORY (INHALATION)
Qty: 0 | Refills: 0 | DISCHARGE
Start: 2017-04-05

## 2017-04-05 RX ORDER — OLANZAPINE 15 MG/1
1 TABLET, FILM COATED ORAL
Qty: 0 | Refills: 0 | DISCHARGE
Start: 2017-04-05

## 2017-04-05 RX ORDER — FUROSEMIDE 40 MG
60 TABLET ORAL DAILY
Refills: 0 | Status: DISCONTINUED | OUTPATIENT
Start: 2017-04-06 | End: 2017-04-05

## 2017-04-05 RX ORDER — FUROSEMIDE 40 MG
2 TABLET ORAL
Qty: 0 | Refills: 0 | DISCHARGE
Start: 2017-04-05

## 2017-04-05 RX ORDER — IPRATROPIUM/ALBUTEROL SULFATE 18-103MCG
3 AEROSOL WITH ADAPTER (GRAM) INHALATION
Qty: 0 | Refills: 0 | DISCHARGE
Start: 2017-04-05

## 2017-04-05 RX ORDER — SOD,AMMONIUM,POTASSIUM LACTATE
1 CREAM (GRAM) TOPICAL DAILY
Refills: 0 | Status: DISCONTINUED | OUTPATIENT
Start: 2017-04-05 | End: 2017-04-05

## 2017-04-05 RX ORDER — BUDESONIDE AND FORMOTEROL FUMARATE DIHYDRATE 160; 4.5 UG/1; UG/1
2 AEROSOL RESPIRATORY (INHALATION)
Qty: 0 | Refills: 0 | DISCHARGE
Start: 2017-04-05

## 2017-04-05 RX ORDER — DOCUSATE SODIUM 100 MG
1 CAPSULE ORAL
Qty: 0 | Refills: 0 | DISCHARGE
Start: 2017-04-05

## 2017-04-05 RX ORDER — SENNA PLUS 8.6 MG/1
2 TABLET ORAL
Qty: 0 | Refills: 0 | DISCHARGE
Start: 2017-04-05

## 2017-04-05 RX ORDER — LEVOTHYROXINE SODIUM 125 MCG
150 TABLET ORAL DAILY
Refills: 0 | Status: DISCONTINUED | OUTPATIENT
Start: 2017-04-05 | End: 2017-04-05

## 2017-04-05 RX ORDER — ENOXAPARIN SODIUM 100 MG/ML
40 INJECTION SUBCUTANEOUS
Qty: 0 | Refills: 0 | DISCHARGE
Start: 2017-04-05

## 2017-04-05 RX ADMIN — OLANZAPINE 10 MILLIGRAM(S): 15 TABLET, FILM COATED ORAL at 12:22

## 2017-04-05 RX ADMIN — Medication 75 MICROGRAM(S): at 06:17

## 2017-04-05 RX ADMIN — BUDESONIDE AND FORMOTEROL FUMARATE DIHYDRATE 2 PUFF(S): 160; 4.5 AEROSOL RESPIRATORY (INHALATION) at 11:05

## 2017-04-05 RX ADMIN — MONTELUKAST 10 MILLIGRAM(S): 4 TABLET, CHEWABLE ORAL at 12:21

## 2017-04-05 RX ADMIN — Medication 6: at 12:19

## 2017-04-05 RX ADMIN — LOSARTAN POTASSIUM 100 MILLIGRAM(S): 100 TABLET, FILM COATED ORAL at 06:18

## 2017-04-05 RX ADMIN — Medication 3 MILLILITER(S): at 03:02

## 2017-04-05 RX ADMIN — Medication 200 MILLIGRAM(S): at 17:06

## 2017-04-05 RX ADMIN — Medication 1 APPLICATION(S): at 12:20

## 2017-04-05 RX ADMIN — Medication 3 MILLILITER(S): at 08:53

## 2017-04-05 RX ADMIN — Medication 100 MILLIGRAM(S): at 13:13

## 2017-04-05 RX ADMIN — TIOTROPIUM BROMIDE 1 CAPSULE(S): 18 CAPSULE ORAL; RESPIRATORY (INHALATION) at 12:20

## 2017-04-05 RX ADMIN — Medication 81 MILLIGRAM(S): at 12:21

## 2017-04-05 RX ADMIN — Medication 3 MILLILITER(S): at 14:46

## 2017-04-05 RX ADMIN — Medication 100 MILLIGRAM(S): at 06:18

## 2017-04-05 RX ADMIN — Medication 200 MILLIGRAM(S): at 12:21

## 2017-04-05 RX ADMIN — ENOXAPARIN SODIUM 40 MILLIGRAM(S): 100 INJECTION SUBCUTANEOUS at 12:21

## 2017-04-05 RX ADMIN — Medication 30 MILLIGRAM(S): at 06:17

## 2017-04-05 RX ADMIN — Medication 2: at 08:09

## 2017-04-05 RX ADMIN — Medication 4: at 17:05

## 2017-04-05 RX ADMIN — Medication 200 MILLIGRAM(S): at 06:18

## 2017-04-10 DIAGNOSIS — Z79.84 LONG TERM (CURRENT) USE OF ORAL HYPOGLYCEMIC DRUGS: ICD-10-CM

## 2017-04-10 DIAGNOSIS — J96.02 ACUTE RESPIRATORY FAILURE WITH HYPERCAPNIA: ICD-10-CM

## 2017-04-10 DIAGNOSIS — J44.1 CHRONIC OBSTRUCTIVE PULMONARY DISEASE WITH (ACUTE) EXACERBATION: ICD-10-CM

## 2017-04-10 DIAGNOSIS — I50.33 ACUTE ON CHRONIC DIASTOLIC (CONGESTIVE) HEART FAILURE: ICD-10-CM

## 2017-04-10 DIAGNOSIS — L03.116 CELLULITIS OF LEFT LOWER LIMB: ICD-10-CM

## 2017-04-10 DIAGNOSIS — E11.65 TYPE 2 DIABETES MELLITUS WITH HYPERGLYCEMIA: ICD-10-CM

## 2017-04-10 DIAGNOSIS — I50.32 CHRONIC DIASTOLIC (CONGESTIVE) HEART FAILURE: ICD-10-CM

## 2017-04-10 DIAGNOSIS — E66.01 MORBID (SEVERE) OBESITY DUE TO EXCESS CALORIES: ICD-10-CM

## 2017-04-10 DIAGNOSIS — E03.9 HYPOTHYROIDISM, UNSPECIFIED: ICD-10-CM

## 2017-04-10 DIAGNOSIS — E78.5 HYPERLIPIDEMIA, UNSPECIFIED: ICD-10-CM

## 2017-04-10 DIAGNOSIS — Z79.82 LONG TERM (CURRENT) USE OF ASPIRIN: ICD-10-CM

## 2017-04-10 DIAGNOSIS — L03.115 CELLULITIS OF RIGHT LOWER LIMB: ICD-10-CM

## 2017-04-10 DIAGNOSIS — E22.2 SYNDROME OF INAPPROPRIATE SECRETION OF ANTIDIURETIC HORMONE: ICD-10-CM

## 2017-04-10 DIAGNOSIS — G47.33 OBSTRUCTIVE SLEEP APNEA (ADULT) (PEDIATRIC): ICD-10-CM

## 2017-04-10 DIAGNOSIS — I11.0 HYPERTENSIVE HEART DISEASE WITH HEART FAILURE: ICD-10-CM

## 2017-04-10 DIAGNOSIS — J45.909 UNSPECIFIED ASTHMA, UNCOMPLICATED: ICD-10-CM

## 2017-04-10 DIAGNOSIS — J12.3 HUMAN METAPNEUMOVIRUS PNEUMONIA: ICD-10-CM

## 2017-04-10 DIAGNOSIS — I10 ESSENTIAL (PRIMARY) HYPERTENSION: ICD-10-CM

## 2017-04-10 DIAGNOSIS — J44.0 CHRONIC OBSTRUCTIVE PULMONARY DISEASE WITH ACUTE LOWER RESPIRATORY INFECTION: ICD-10-CM

## 2017-04-10 DIAGNOSIS — Z88.1 ALLERGY STATUS TO OTHER ANTIBIOTIC AGENTS STATUS: ICD-10-CM

## 2017-04-10 DIAGNOSIS — F31.9 BIPOLAR DISORDER, UNSPECIFIED: ICD-10-CM

## 2017-04-10 DIAGNOSIS — F25.0 SCHIZOAFFECTIVE DISORDER, BIPOLAR TYPE: ICD-10-CM

## 2017-04-10 DIAGNOSIS — M25.552 PAIN IN LEFT HIP: ICD-10-CM

## 2017-04-18 ENCOUNTER — INPATIENT (INPATIENT)
Facility: HOSPITAL | Age: 72
LOS: 5 days | Discharge: NURSING, REHAB OR LONG-TERM CARE | DRG: 292 | End: 2017-04-24
Attending: INTERNAL MEDICINE | Admitting: INTERNAL MEDICINE
Payer: MEDICARE

## 2017-04-18 VITALS
DIASTOLIC BLOOD PRESSURE: 69 MMHG | HEART RATE: 81 BPM | TEMPERATURE: 209 F | RESPIRATION RATE: 20 BRPM | SYSTOLIC BLOOD PRESSURE: 118 MMHG | OXYGEN SATURATION: 99 %

## 2017-04-18 DIAGNOSIS — J44.9 CHRONIC OBSTRUCTIVE PULMONARY DISEASE, UNSPECIFIED: ICD-10-CM

## 2017-04-18 LAB
ALBUMIN SERPL ELPH-MCNC: 2.4 G/DL — LOW (ref 3.5–5)
ALP SERPL-CCNC: 63 U/L — SIGNIFICANT CHANGE UP (ref 40–120)
ALT FLD-CCNC: 36 U/L DA — SIGNIFICANT CHANGE UP (ref 10–60)
ANION GAP SERPL CALC-SCNC: 7 MMOL/L — SIGNIFICANT CHANGE UP (ref 5–17)
AST SERPL-CCNC: 33 U/L — SIGNIFICANT CHANGE UP (ref 10–40)
BASOPHILS # BLD AUTO: 0.1 K/UL — SIGNIFICANT CHANGE UP (ref 0–0.2)
BASOPHILS NFR BLD AUTO: 0.8 % — SIGNIFICANT CHANGE UP (ref 0–2)
BILIRUB SERPL-MCNC: 0.3 MG/DL — SIGNIFICANT CHANGE UP (ref 0.2–1.2)
BUN SERPL-MCNC: 42 MG/DL — HIGH (ref 7–18)
CALCIUM SERPL-MCNC: 8.8 MG/DL — SIGNIFICANT CHANGE UP (ref 8.4–10.5)
CHLORIDE SERPL-SCNC: 85 MMOL/L — LOW (ref 96–108)
CK SERPL-CCNC: 102 U/L — SIGNIFICANT CHANGE UP (ref 21–215)
CO2 SERPL-SCNC: 35 MMOL/L — HIGH (ref 22–31)
CREAT SERPL-MCNC: 1.27 MG/DL — SIGNIFICANT CHANGE UP (ref 0.5–1.3)
EOSINOPHIL # BLD AUTO: 0.2 K/UL — SIGNIFICANT CHANGE UP (ref 0–0.5)
EOSINOPHIL NFR BLD AUTO: 3.1 % — SIGNIFICANT CHANGE UP (ref 0–6)
FLUBV RNA SPEC QL NAA+PROBE: DETECTED
GLUCOSE SERPL-MCNC: 103 MG/DL — HIGH (ref 70–99)
HCT VFR BLD CALC: 31.1 % — LOW (ref 34.5–45)
HGB BLD-MCNC: 10.3 G/DL — LOW (ref 11.5–15.5)
LYMPHOCYTES # BLD AUTO: 1.5 K/UL — SIGNIFICANT CHANGE UP (ref 1–3.3)
LYMPHOCYTES # BLD AUTO: 22.1 % — SIGNIFICANT CHANGE UP (ref 13–44)
MCHC RBC-ENTMCNC: 26.4 PG — LOW (ref 27–34)
MCHC RBC-ENTMCNC: 33.1 GM/DL — SIGNIFICANT CHANGE UP (ref 32–36)
MCV RBC AUTO: 79.7 FL — LOW (ref 80–100)
MONOCYTES # BLD AUTO: 0.4 K/UL — SIGNIFICANT CHANGE UP (ref 0–0.9)
MONOCYTES NFR BLD AUTO: 6 % — SIGNIFICANT CHANGE UP (ref 2–14)
NEUTROPHILS # BLD AUTO: 4.7 K/UL — SIGNIFICANT CHANGE UP (ref 1.8–7.4)
NEUTROPHILS NFR BLD AUTO: 68 % — SIGNIFICANT CHANGE UP (ref 43–77)
NT-PROBNP SERPL-SCNC: 71 PG/ML — SIGNIFICANT CHANGE UP (ref 0–125)
PLATELET # BLD AUTO: 169 K/UL — SIGNIFICANT CHANGE UP (ref 150–400)
POTASSIUM SERPL-MCNC: 3.7 MMOL/L — SIGNIFICANT CHANGE UP (ref 3.5–5.3)
POTASSIUM SERPL-SCNC: 3.7 MMOL/L — SIGNIFICANT CHANGE UP (ref 3.5–5.3)
PROT SERPL-MCNC: 6.3 G/DL — SIGNIFICANT CHANGE UP (ref 6–8.3)
RAPID RVP RESULT: DETECTED
RBC # BLD: 3.9 M/UL — SIGNIFICANT CHANGE UP (ref 3.8–5.2)
RBC # FLD: 15.1 % — HIGH (ref 10.3–14.5)
SODIUM SERPL-SCNC: 127 MMOL/L — LOW (ref 135–145)
TROPONIN I SERPL-MCNC: <0.015 NG/ML — SIGNIFICANT CHANGE UP (ref 0–0.04)
WBC # BLD: 6.9 K/UL — SIGNIFICANT CHANGE UP (ref 3.8–10.5)
WBC # FLD AUTO: 6.9 K/UL — SIGNIFICANT CHANGE UP (ref 3.8–10.5)

## 2017-04-18 PROCEDURE — 71010: CPT | Mod: 26

## 2017-04-18 PROCEDURE — 99285 EMERGENCY DEPT VISIT HI MDM: CPT

## 2017-04-18 RX ORDER — IPRATROPIUM/ALBUTEROL SULFATE 18-103MCG
3 AEROSOL WITH ADAPTER (GRAM) INHALATION ONCE
Refills: 0 | Status: COMPLETED | OUTPATIENT
Start: 2017-04-18 | End: 2017-04-19

## 2017-04-18 RX ORDER — SODIUM CHLORIDE 9 MG/ML
3 INJECTION INTRAMUSCULAR; INTRAVENOUS; SUBCUTANEOUS ONCE
Refills: 0 | Status: COMPLETED | OUTPATIENT
Start: 2017-04-18 | End: 2017-04-18

## 2017-04-18 RX ORDER — FUROSEMIDE 40 MG
60 TABLET ORAL ONCE
Refills: 0 | Status: COMPLETED | OUTPATIENT
Start: 2017-04-18 | End: 2017-04-18

## 2017-04-18 RX ADMIN — SODIUM CHLORIDE 3 MILLILITER(S): 9 INJECTION INTRAMUSCULAR; INTRAVENOUS; SUBCUTANEOUS at 17:34

## 2017-04-18 RX ADMIN — Medication 125 MILLIGRAM(S): at 17:33

## 2017-04-18 NOTE — ED ADULT NURSE REASSESSMENT NOTE - NS ED NURSE REASSESS COMMENT FT1
Patient remains awake, alert, resting in stretcher, on wall cardiac monitor, sinus rhythm pulse 88. Patient on 3L oxygen via nasal cannula, pulse oximetry 95%, respiration rate 20. Awaiting inpatient bed.

## 2017-04-18 NOTE — ED PROVIDER NOTE - OBJECTIVE STATEMENT
72 y/o F pt with PMHx of COPD, CHF, HTN, HLD, Hypothyroidism, Schizo affective schizophrenia, brought in by nursing home to ED c/o SOB and wheezing and was given nebulizers. Pt denies fever, chills, CP, or any other complaints. ALLERGIES: Moxifloxacin, Avelox

## 2017-04-19 DIAGNOSIS — I10 ESSENTIAL (PRIMARY) HYPERTENSION: ICD-10-CM

## 2017-04-19 DIAGNOSIS — E87.1 HYPO-OSMOLALITY AND HYPONATREMIA: ICD-10-CM

## 2017-04-19 DIAGNOSIS — E03.9 HYPOTHYROIDISM, UNSPECIFIED: ICD-10-CM

## 2017-04-19 DIAGNOSIS — Z41.8 ENCOUNTER FOR OTHER PROCEDURES FOR PURPOSES OTHER THAN REMEDYING HEALTH STATE: ICD-10-CM

## 2017-04-19 DIAGNOSIS — F25.0 SCHIZOAFFECTIVE DISORDER, BIPOLAR TYPE: ICD-10-CM

## 2017-04-19 DIAGNOSIS — J10.1 INFLUENZA DUE TO OTHER IDENTIFIED INFLUENZA VIRUS WITH OTHER RESPIRATORY MANIFESTATIONS: ICD-10-CM

## 2017-04-19 DIAGNOSIS — I50.33 ACUTE ON CHRONIC DIASTOLIC (CONGESTIVE) HEART FAILURE: ICD-10-CM

## 2017-04-19 DIAGNOSIS — J44.9 CHRONIC OBSTRUCTIVE PULMONARY DISEASE, UNSPECIFIED: ICD-10-CM

## 2017-04-19 DIAGNOSIS — N17.9 ACUTE KIDNEY FAILURE, UNSPECIFIED: ICD-10-CM

## 2017-04-19 LAB
ALBUMIN SERPL ELPH-MCNC: 2.5 G/DL — LOW (ref 3.5–5)
ALP SERPL-CCNC: 70 U/L — SIGNIFICANT CHANGE UP (ref 40–120)
ALT FLD-CCNC: 39 U/L DA — SIGNIFICANT CHANGE UP (ref 10–60)
ANION GAP SERPL CALC-SCNC: 6 MMOL/L — SIGNIFICANT CHANGE UP (ref 5–17)
ANION GAP SERPL CALC-SCNC: 9 MMOL/L — SIGNIFICANT CHANGE UP (ref 5–17)
APPEARANCE UR: CLEAR — SIGNIFICANT CHANGE UP
AST SERPL-CCNC: 30 U/L — SIGNIFICANT CHANGE UP (ref 10–40)
BASE EXCESS BLDA CALC-SCNC: 9 MMOL/L — HIGH (ref -2–2)
BASOPHILS # BLD AUTO: 0 K/UL — SIGNIFICANT CHANGE UP (ref 0–0.2)
BASOPHILS NFR BLD AUTO: 0.1 % — SIGNIFICANT CHANGE UP (ref 0–2)
BILIRUB SERPL-MCNC: 0.3 MG/DL — SIGNIFICANT CHANGE UP (ref 0.2–1.2)
BILIRUB UR-MCNC: NEGATIVE — SIGNIFICANT CHANGE UP
BUN SERPL-MCNC: 37 MG/DL — HIGH (ref 7–18)
BUN SERPL-MCNC: 40 MG/DL — HIGH (ref 7–18)
CALCIUM SERPL-MCNC: 9.2 MG/DL — SIGNIFICANT CHANGE UP (ref 8.4–10.5)
CALCIUM SERPL-MCNC: 9.5 MG/DL — SIGNIFICANT CHANGE UP (ref 8.4–10.5)
CHLORIDE SERPL-SCNC: 86 MMOL/L — LOW (ref 96–108)
CHLORIDE SERPL-SCNC: 87 MMOL/L — LOW (ref 96–108)
CK MB BLD-MCNC: <1.2 % — SIGNIFICANT CHANGE UP (ref 0–3.5)
CK MB CFR SERPL CALC: <1 NG/ML — SIGNIFICANT CHANGE UP (ref 0–3.6)
CK SERPL-CCNC: 82 U/L — SIGNIFICANT CHANGE UP (ref 21–215)
CO2 SERPL-SCNC: 37 MMOL/L — HIGH (ref 22–31)
CO2 SERPL-SCNC: 38 MMOL/L — HIGH (ref 22–31)
COLOR SPEC: YELLOW — SIGNIFICANT CHANGE UP
CREAT ?TM UR-MCNC: <13 MG/DL — SIGNIFICANT CHANGE UP
CREAT SERPL-MCNC: 0.94 MG/DL — SIGNIFICANT CHANGE UP (ref 0.5–1.3)
CREAT SERPL-MCNC: 1.01 MG/DL — SIGNIFICANT CHANGE UP (ref 0.5–1.3)
DIFF PNL FLD: ABNORMAL
EOSINOPHIL # BLD AUTO: 0 K/UL — SIGNIFICANT CHANGE UP (ref 0–0.5)
EOSINOPHIL NFR BLD AUTO: 0 % — SIGNIFICANT CHANGE UP (ref 0–6)
FERRITIN SERPL-MCNC: 167.8 NG/ML — HIGH (ref 15–150)
GLUCOSE SERPL-MCNC: 202 MG/DL — HIGH (ref 70–99)
GLUCOSE SERPL-MCNC: 223 MG/DL — HIGH (ref 70–99)
GLUCOSE UR QL: NEGATIVE — SIGNIFICANT CHANGE UP
HBA1C BLD-MCNC: 7.9 % — HIGH (ref 4–5.6)
HCO3 BLDA-SCNC: 35 MMOL/L — HIGH (ref 23–27)
HCT VFR BLD CALC: 35.5 % — SIGNIFICANT CHANGE UP (ref 34.5–45)
HGB BLD-MCNC: 11.4 G/DL — LOW (ref 11.5–15.5)
HOROWITZ INDEX BLDA+IHG-RTO: SIGNIFICANT CHANGE UP
IRON SATN MFR SERPL: 23 UG/DL — LOW (ref 40–150)
IRON SATN MFR SERPL: 7 % — LOW (ref 15–50)
KETONES UR-MCNC: NEGATIVE — SIGNIFICANT CHANGE UP
LEUKOCYTE ESTERASE UR-ACNC: ABNORMAL
LYMPHOCYTES # BLD AUTO: 0.5 K/UL — LOW (ref 1–3.3)
LYMPHOCYTES # BLD AUTO: 5.5 % — LOW (ref 13–44)
MAGNESIUM SERPL-MCNC: 2 MG/DL — SIGNIFICANT CHANGE UP (ref 1.8–2.4)
MCHC RBC-ENTMCNC: 26 PG — LOW (ref 27–34)
MCHC RBC-ENTMCNC: 32.1 GM/DL — SIGNIFICANT CHANGE UP (ref 32–36)
MCV RBC AUTO: 81.1 FL — SIGNIFICANT CHANGE UP (ref 80–100)
MONOCYTES # BLD AUTO: 0.1 K/UL — SIGNIFICANT CHANGE UP (ref 0–0.9)
MONOCYTES NFR BLD AUTO: 1.2 % — LOW (ref 2–14)
NEUTROPHILS # BLD AUTO: 8.6 K/UL — HIGH (ref 1.8–7.4)
NEUTROPHILS NFR BLD AUTO: 93.2 % — HIGH (ref 43–77)
NITRITE UR-MCNC: NEGATIVE — SIGNIFICANT CHANGE UP
OB PNL STL: NEGATIVE — SIGNIFICANT CHANGE UP
OSMOLALITY SERPL: 283 MOS/KG — SIGNIFICANT CHANGE UP (ref 275–300)
PCO2 BLDA: 60 MMHG — HIGH (ref 32–46)
PH BLDA: 7.39 — SIGNIFICANT CHANGE UP (ref 7.35–7.45)
PH UR: 5 — SIGNIFICANT CHANGE UP (ref 5–8)
PHOSPHATE SERPL-MCNC: 4.1 MG/DL — SIGNIFICANT CHANGE UP (ref 2.5–4.5)
PLATELET # BLD AUTO: 178 K/UL — SIGNIFICANT CHANGE UP (ref 150–400)
PO2 BLDA: 109 MMHG — HIGH (ref 74–108)
POTASSIUM SERPL-MCNC: 3.4 MMOL/L — LOW (ref 3.5–5.3)
POTASSIUM SERPL-MCNC: 3.7 MMOL/L — SIGNIFICANT CHANGE UP (ref 3.5–5.3)
POTASSIUM SERPL-SCNC: 3.4 MMOL/L — LOW (ref 3.5–5.3)
POTASSIUM SERPL-SCNC: 3.7 MMOL/L — SIGNIFICANT CHANGE UP (ref 3.5–5.3)
PROT ?TM UR-MCNC: <5 MG/DL — SIGNIFICANT CHANGE UP (ref 0–12)
PROT SERPL-MCNC: 6.8 G/DL — SIGNIFICANT CHANGE UP (ref 6–8.3)
PROT UR-MCNC: NEGATIVE — SIGNIFICANT CHANGE UP
RBC # BLD: 4.14 M/UL — SIGNIFICANT CHANGE UP (ref 3.8–5.2)
RBC # BLD: 4.38 M/UL — SIGNIFICANT CHANGE UP (ref 3.8–5.2)
RBC # FLD: 14.7 % — HIGH (ref 10.3–14.5)
RETICS #: 31.5 K/UL — SIGNIFICANT CHANGE UP (ref 25–125)
RETICS/RBC NFR: 0.8 % — SIGNIFICANT CHANGE UP (ref 0.5–2.5)
SAO2 % BLDA: 98 % — HIGH (ref 92–96)
SODIUM SERPL-SCNC: 130 MMOL/L — LOW (ref 135–145)
SODIUM SERPL-SCNC: 133 MMOL/L — LOW (ref 135–145)
SODIUM UR-SCNC: 67 MMOL/L — SIGNIFICANT CHANGE UP (ref 40–220)
SP GR SPEC: 1.01 — SIGNIFICANT CHANGE UP (ref 1.01–1.02)
TIBC SERPL-MCNC: 324 UG/DL — SIGNIFICANT CHANGE UP (ref 250–450)
TRANSFERRIN SERPL-MCNC: 260 MG/DL — SIGNIFICANT CHANGE UP (ref 200–360)
TROPONIN I SERPL-MCNC: <0.015 NG/ML — SIGNIFICANT CHANGE UP (ref 0–0.04)
TSH SERPL-MCNC: 2.62 UU/ML — SIGNIFICANT CHANGE UP (ref 0.34–4.82)
UIBC SERPL-MCNC: 301 UG/DL — SIGNIFICANT CHANGE UP (ref 110–370)
UROBILINOGEN FLD QL: NEGATIVE — SIGNIFICANT CHANGE UP
WBC # BLD: 9.2 K/UL — SIGNIFICANT CHANGE UP (ref 3.8–10.5)
WBC # FLD AUTO: 9.2 K/UL — SIGNIFICANT CHANGE UP (ref 3.8–10.5)

## 2017-04-19 RX ORDER — FUROSEMIDE 40 MG
40 TABLET ORAL EVERY 12 HOURS
Refills: 0 | Status: DISCONTINUED | OUTPATIENT
Start: 2017-04-19 | End: 2017-04-21

## 2017-04-19 RX ORDER — AMLODIPINE BESYLATE 2.5 MG/1
10 TABLET ORAL DAILY
Refills: 0 | Status: DISCONTINUED | OUTPATIENT
Start: 2017-04-19 | End: 2017-04-19

## 2017-04-19 RX ORDER — IPRATROPIUM/ALBUTEROL SULFATE 18-103MCG
3 AEROSOL WITH ADAPTER (GRAM) INHALATION EVERY 6 HOURS
Refills: 0 | Status: DISCONTINUED | OUTPATIENT
Start: 2017-04-19 | End: 2017-04-24

## 2017-04-19 RX ORDER — LEVOTHYROXINE SODIUM 125 MCG
150 TABLET ORAL DAILY
Refills: 0 | Status: DISCONTINUED | OUTPATIENT
Start: 2017-04-19 | End: 2017-04-24

## 2017-04-19 RX ORDER — CEFTRIAXONE 500 MG/1
1 INJECTION, POWDER, FOR SOLUTION INTRAMUSCULAR; INTRAVENOUS EVERY 24 HOURS
Refills: 0 | Status: DISCONTINUED | OUTPATIENT
Start: 2017-04-20 | End: 2017-04-24

## 2017-04-19 RX ORDER — MONTELUKAST 4 MG/1
10 TABLET, CHEWABLE ORAL AT BEDTIME
Refills: 0 | Status: DISCONTINUED | OUTPATIENT
Start: 2017-04-19 | End: 2017-04-24

## 2017-04-19 RX ORDER — LOSARTAN POTASSIUM 100 MG/1
100 TABLET, FILM COATED ORAL DAILY
Refills: 0 | Status: DISCONTINUED | OUTPATIENT
Start: 2017-04-19 | End: 2017-04-19

## 2017-04-19 RX ORDER — CEFTRIAXONE 500 MG/1
1 INJECTION, POWDER, FOR SOLUTION INTRAMUSCULAR; INTRAVENOUS ONCE
Refills: 0 | Status: COMPLETED | OUTPATIENT
Start: 2017-04-19 | End: 2017-04-19

## 2017-04-19 RX ORDER — CEFTRIAXONE 500 MG/1
INJECTION, POWDER, FOR SOLUTION INTRAMUSCULAR; INTRAVENOUS
Refills: 0 | Status: DISCONTINUED | OUTPATIENT
Start: 2017-04-19 | End: 2017-04-24

## 2017-04-19 RX ORDER — ENOXAPARIN SODIUM 100 MG/ML
40 INJECTION SUBCUTANEOUS DAILY
Refills: 0 | Status: DISCONTINUED | OUTPATIENT
Start: 2017-04-19 | End: 2017-04-24

## 2017-04-19 RX ORDER — ASPIRIN/CALCIUM CARB/MAGNESIUM 324 MG
81 TABLET ORAL DAILY
Refills: 0 | Status: DISCONTINUED | OUTPATIENT
Start: 2017-04-19 | End: 2017-04-24

## 2017-04-19 RX ORDER — OLANZAPINE 15 MG/1
10 TABLET, FILM COATED ORAL DAILY
Refills: 0 | Status: DISCONTINUED | OUTPATIENT
Start: 2017-04-19 | End: 2017-04-21

## 2017-04-19 RX ORDER — AMLODIPINE BESYLATE 2.5 MG/1
10 TABLET ORAL DAILY
Refills: 0 | Status: DISCONTINUED | OUTPATIENT
Start: 2017-04-19 | End: 2017-04-24

## 2017-04-19 RX ADMIN — Medication 40 MILLIGRAM(S): at 14:49

## 2017-04-19 RX ADMIN — Medication 3 MILLILITER(S): at 00:21

## 2017-04-19 RX ADMIN — Medication 40 MILLIGRAM(S): at 22:38

## 2017-04-19 RX ADMIN — Medication 40 MILLIGRAM(S): at 06:40

## 2017-04-19 RX ADMIN — Medication 75 MILLIGRAM(S): at 12:09

## 2017-04-19 RX ADMIN — Medication 40 MILLIGRAM(S): at 17:35

## 2017-04-19 RX ADMIN — Medication 81 MILLIGRAM(S): at 12:09

## 2017-04-19 RX ADMIN — ENOXAPARIN SODIUM 40 MILLIGRAM(S): 100 INJECTION SUBCUTANEOUS at 12:09

## 2017-04-19 RX ADMIN — Medication 60 MILLIGRAM(S): at 00:27

## 2017-04-19 RX ADMIN — AMLODIPINE BESYLATE 10 MILLIGRAM(S): 2.5 TABLET ORAL at 06:40

## 2017-04-19 RX ADMIN — Medication 40 MILLIGRAM(S): at 06:41

## 2017-04-19 RX ADMIN — Medication 3 MILLILITER(S): at 08:18

## 2017-04-19 RX ADMIN — Medication 3 MILLILITER(S): at 03:29

## 2017-04-19 RX ADMIN — Medication 75 MILLIGRAM(S): at 17:35

## 2017-04-19 RX ADMIN — MONTELUKAST 10 MILLIGRAM(S): 4 TABLET, CHEWABLE ORAL at 22:38

## 2017-04-19 RX ADMIN — OLANZAPINE 10 MILLIGRAM(S): 15 TABLET, FILM COATED ORAL at 14:48

## 2017-04-19 RX ADMIN — Medication 3 MILLILITER(S): at 14:53

## 2017-04-19 RX ADMIN — Medication 3 MILLILITER(S): at 20:36

## 2017-04-19 RX ADMIN — Medication 150 MICROGRAM(S): at 06:40

## 2017-04-19 RX ADMIN — CEFTRIAXONE 100 GRAM(S): 500 INJECTION, POWDER, FOR SOLUTION INTRAMUSCULAR; INTRAVENOUS at 06:55

## 2017-04-19 RX ADMIN — Medication 75 MILLIGRAM(S): at 00:27

## 2017-04-19 NOTE — H&P ADULT. - PROBLEM SELECTOR PLAN 3
likely 2/2 ADH excess 2/2 heart failure - intravascular depleted but total body fluid overloaded  - check urine lytes and osmolality and plasma osmolality  - monitor q4 for now

## 2017-04-19 NOTE — H&P ADULT. - PROBLEM SELECTOR PLAN 4
wheezing likely 'cardiac' wheeze, will hold off on steroids for now  - duoneb, montelukast likely prerenal 2/2 acute on chronic heart failure  - hold arb

## 2017-04-19 NOTE — H&P ADULT. - HISTORY OF PRESENT ILLNESS
70[facility]. COPD, HFpEF (last TTE unable to assess endocardium), morbid obesity, sent to ED for shortness of breath. Patient was recently admitted to [facility] from 3/29 to 4/5 2/2 acute on chronic heart failure 2/2 metapneumo virus infection. Patient was placed on BIPAP and diuresed.  Patient is poor historian, and history obtained from [facility] records and prior hospitalization.  Patient is somnolent, albeit arousable and oriented to place and time.    As per [facility] documents, patient is full code.

## 2017-04-19 NOTE — H&P ADULT. - PROBLEM SELECTOR PLAN 1
patient appears fluid overloaded with rales and 'cardiac wheeze' on exam, congestion on CXR, bilateral pitting edema,  albeit normal BNP but patient is morbidly obese so BNP may not be diagnostic.  acute on chronic heart failure likely 2/2 influenza B infection  - lasix 60mg IV x 1 in ED, fonseca  - daily weights, strict ins and outs  - lasix 40mg IV q12 + zaroxolyn  - telemetry, trend troponins.  No need for repeat TTE as recent TTE in 3/2017 - not successful given poor acoustic windows 2/2 obesity patient appears fluid overloaded with rales and 'cardiac wheeze' on exam, congestion on CXR, bilateral pitting edema,  albeit normal BNP but patient is morbidly obese so BNP may not be diagnostic.  acute on chronic heart failure likely 2/2 influenza B infection  - lasix 60mg IV x 1 in ED, fonseca  - daily weights, strict ins and outs  - lasix 40mg IV q12 + zaroxolyn  - telemetry, trend troponins.  No need for repeat TTE as recent TTE in 3/2017 - not successful given poor acoustic windows 2/2 obesity  - pitting BLE edema with redness - doubt cellulitis as bilateral, normal WBC, afebrile

## 2017-04-19 NOTE — H&P ADULT. - ASSESSMENT
70yo female from [facility] schizoaffective disorder. COPD, HFpEF (last TTE unable to assess endocardium), morbid obesity, sent to ED for shortness of breath 2/2 acute on chronic diastolic heart failure 2/2 influenza B infection

## 2017-04-20 LAB
ANION GAP SERPL CALC-SCNC: 7 MMOL/L — SIGNIFICANT CHANGE UP (ref 5–17)
BUN SERPL-MCNC: 45 MG/DL — HIGH (ref 7–18)
CALCIUM SERPL-MCNC: 8.6 MG/DL — SIGNIFICANT CHANGE UP (ref 8.4–10.5)
CHLORIDE SERPL-SCNC: 86 MMOL/L — LOW (ref 96–108)
CO2 SERPL-SCNC: 39 MMOL/L — HIGH (ref 22–31)
CREAT SERPL-MCNC: 1.25 MG/DL — SIGNIFICANT CHANGE UP (ref 0.5–1.3)
GLUCOSE SERPL-MCNC: 245 MG/DL — HIGH (ref 70–99)
HCT VFR BLD CALC: 33.1 % — LOW (ref 34.5–45)
HGB BLD-MCNC: 10.6 G/DL — LOW (ref 11.5–15.5)
MAGNESIUM SERPL-MCNC: 2 MG/DL — SIGNIFICANT CHANGE UP (ref 1.8–2.4)
MCHC RBC-ENTMCNC: 26.1 PG — LOW (ref 27–34)
MCHC RBC-ENTMCNC: 32.1 GM/DL — SIGNIFICANT CHANGE UP (ref 32–36)
MCV RBC AUTO: 81.3 FL — SIGNIFICANT CHANGE UP (ref 80–100)
PHOSPHATE SERPL-MCNC: 2.8 MG/DL — SIGNIFICANT CHANGE UP (ref 2.5–4.5)
PLATELET # BLD AUTO: 197 K/UL — SIGNIFICANT CHANGE UP (ref 150–400)
POTASSIUM SERPL-MCNC: 3.1 MMOL/L — LOW (ref 3.5–5.3)
POTASSIUM SERPL-SCNC: 3.1 MMOL/L — LOW (ref 3.5–5.3)
RBC # BLD: 4.07 M/UL — SIGNIFICANT CHANGE UP (ref 3.8–5.2)
RBC # FLD: 15.1 % — HIGH (ref 10.3–14.5)
SODIUM SERPL-SCNC: 132 MMOL/L — LOW (ref 135–145)
WBC # BLD: 18.3 K/UL — HIGH (ref 3.8–10.5)
WBC # FLD AUTO: 18.3 K/UL — HIGH (ref 3.8–10.5)

## 2017-04-20 RX ORDER — ACETAMINOPHEN 500 MG
650 TABLET ORAL ONCE
Refills: 0 | Status: COMPLETED | OUTPATIENT
Start: 2017-04-20 | End: 2017-04-20

## 2017-04-20 RX ORDER — INSULIN LISPRO 100/ML
VIAL (ML) SUBCUTANEOUS
Refills: 0 | Status: DISCONTINUED | OUTPATIENT
Start: 2017-04-20 | End: 2017-04-20

## 2017-04-20 RX ORDER — LANOLIN ALCOHOL/MO/W.PET/CERES
3 CREAM (GRAM) TOPICAL AT BEDTIME
Refills: 0 | Status: COMPLETED | OUTPATIENT
Start: 2017-04-20 | End: 2017-04-23

## 2017-04-20 RX ORDER — INSULIN LISPRO 100/ML
VIAL (ML) SUBCUTANEOUS
Refills: 0 | Status: DISCONTINUED | OUTPATIENT
Start: 2017-04-20 | End: 2017-04-24

## 2017-04-20 RX ADMIN — Medication 75 MILLIGRAM(S): at 05:56

## 2017-04-20 RX ADMIN — Medication 150 MICROGRAM(S): at 05:56

## 2017-04-20 RX ADMIN — Medication 3 MILLILITER(S): at 10:07

## 2017-04-20 RX ADMIN — Medication 40 MILLIGRAM(S): at 05:57

## 2017-04-20 RX ADMIN — Medication 40 MILLIGRAM(S): at 14:06

## 2017-04-20 RX ADMIN — Medication 4: at 11:58

## 2017-04-20 RX ADMIN — Medication 3 MILLILITER(S): at 21:03

## 2017-04-20 RX ADMIN — Medication 75 MILLIGRAM(S): at 17:12

## 2017-04-20 RX ADMIN — Medication 650 MILLIGRAM(S): at 23:19

## 2017-04-20 RX ADMIN — ENOXAPARIN SODIUM 40 MILLIGRAM(S): 100 INJECTION SUBCUTANEOUS at 11:57

## 2017-04-20 RX ADMIN — Medication 81 MILLIGRAM(S): at 11:57

## 2017-04-20 RX ADMIN — Medication 40 MILLIGRAM(S): at 22:23

## 2017-04-20 RX ADMIN — Medication 3 MILLILITER(S): at 02:29

## 2017-04-20 RX ADMIN — Medication 3 MILLILITER(S): at 14:57

## 2017-04-20 RX ADMIN — MONTELUKAST 10 MILLIGRAM(S): 4 TABLET, CHEWABLE ORAL at 22:23

## 2017-04-20 RX ADMIN — CEFTRIAXONE 100 GRAM(S): 500 INJECTION, POWDER, FOR SOLUTION INTRAMUSCULAR; INTRAVENOUS at 05:53

## 2017-04-20 RX ADMIN — Medication 5: at 17:00

## 2017-04-20 RX ADMIN — OLANZAPINE 10 MILLIGRAM(S): 15 TABLET, FILM COATED ORAL at 11:58

## 2017-04-20 RX ADMIN — Medication 40 MILLIGRAM(S): at 17:12

## 2017-04-20 RX ADMIN — Medication 40 MILLIGRAM(S): at 05:54

## 2017-04-20 RX ADMIN — Medication 4: at 22:22

## 2017-04-20 NOTE — DISCHARGE NOTE ADULT - SECONDARY DIAGNOSIS.
JAD (acute kidney injury) Chronic obstructive pulmonary disease, unspecified COPD type Schizoaffective disorder, bipolar type Essential hypertension UTI (urinary tract infection) Diabetes

## 2017-04-20 NOTE — DISCHARGE NOTE ADULT - CARE PROVIDER_API CALL
Ruben Leavitt (MARY), Medicine  Dept Director  [address] [place] [address]  Phone: [phone]  Fax: [phone]

## 2017-04-20 NOTE — DISCHARGE NOTE ADULT - MEDICATION SUMMARY - MEDICATIONS TO STOP TAKING
I will STOP taking the medications listed below when I get home from the hospital:    losartan 100 mg oral tablet  -- 1 tab(s) by mouth once a day    Zaroxolyn  -- 5 milligram(s) by mouth once a day    predniSONE 10 mg oral tablet  -- 2 tab(s) by mouth once a day for 2 days  1 tab daily for 2 days

## 2017-04-20 NOTE — DISCHARGE NOTE ADULT - PLAN OF CARE
Prevent the acute exacerbation You admitted for acute heart failure exacerbation, you have diastolic heart failure, got IV lasix, diuresed well. later lasix changed to oral tablet. F/up with PCP , repeat the BMP in 1 week. Howard was placed for I / O monitoring. Howard was removed , given TOV, Patient ------------------- Preven the kidney function from worsening You initially had acute kidney injury likely due to underlying CHF, your losartan was stopped . BP stable . Losartan kept on hold, repeat BMP in 1 week. prevent the exacerbation You had COPD , wheezing , got solumedrol IV , later changed to PO steroid, take steroid as prescribed . Monitor your sugars . provide the supportive care Home medications continued keep the BP around 140/90 BP stable , losartan kept on hold. restart as accordingly. treat the infection You have UTI , given rocephin , got for 6 days , upon discharge take ceftin 250 BID x 1 more day  to complete total of 7 day course. Urine cultures negative keep the fingertsticks around 140 -160 Your FS was high because of steroids , Lantus and Humalog given and dose adjusted accordingly. Continue with home medications metformin and insulin HSS. You admitted for acute heart failure exacerbation, you have diastolic heart failure, got IV lasix, diuresed well. later lasix changed to oral tablet. F/up with PCP , repeat the BMP in 1 week. Howard was placed for I / O monitoring. Howard was removed , given TOV, Patient voided .

## 2017-04-20 NOTE — DISCHARGE NOTE ADULT - CARE PLAN
Principal Discharge DX:	Acute on chronic diastolic heart failure  Secondary Diagnosis:	JAD (acute kidney injury)  Secondary Diagnosis:	Chronic obstructive pulmonary disease, unspecified COPD type  Secondary Diagnosis:	Schizoaffective disorder, bipolar type  Secondary Diagnosis:	Essential hypertension  Secondary Diagnosis:	UTI (urinary tract infection)  Secondary Diagnosis:	Diabetes Principal Discharge DX:	Acute on chronic diastolic heart failure  Goal:	Prevent the acute exacerbation  Instructions for follow-up, activity and diet:	You admitted for acute heart failure exacerbation, you have diastolic heart failure, got IV lasix, diuresed well. later lasix changed to oral tablet. F/up with PCP , repeat the BMP in 1 week. Howard was placed for I / O monitoring. Howard was removed , given TOV, Patient -------------------  Secondary Diagnosis:	JAD (acute kidney injury)  Goal:	Preven the kidney function from worsening  Instructions for follow-up, activity and diet:	You initially had acute kidney injury likely due to underlying CHF, your losartan was stopped . BP stable . Losartan kept on hold, repeat BMP in 1 week.  Secondary Diagnosis:	Chronic obstructive pulmonary disease, unspecified COPD type  Goal:	prevent the exacerbation  Instructions for follow-up, activity and diet:	You had COPD , wheezing , got solumedrol IV , later changed to PO steroid, take steroid as prescribed . Monitor your sugars .  Secondary Diagnosis:	Schizoaffective disorder, bipolar type  Goal:	provide the supportive care  Instructions for follow-up, activity and diet:	Home medications continued  Secondary Diagnosis:	Essential hypertension  Goal:	keep the BP around 140/90  Instructions for follow-up, activity and diet:	BP stable , losartan kept on hold. restart as accordingly.  Secondary Diagnosis:	UTI (urinary tract infection)  Goal:	treat the infection  Instructions for follow-up, activity and diet:	You have UTI , given rocephin , got for 6 days , upon discharge take ceftin 250 BID x 1 more day  to complete total of 7 day course. Urine cultures negative  Secondary Diagnosis:	Diabetes  Goal:	keep the fingertsticks around 140 -160  Instructions for follow-up, activity and diet:	Your FS was high because of steroids , Lantus and Humalog given and dose adjusted accordingly. Continue with home medications metformin and insulin HSS. Principal Discharge DX:	Acute on chronic diastolic heart failure  Goal:	Prevent the acute exacerbation  Instructions for follow-up, activity and diet:	You admitted for acute heart failure exacerbation, you have diastolic heart failure, got IV lasix, diuresed well. later lasix changed to oral tablet. F/up with PCP , repeat the BMP in 1 week. Howard was placed for I / O monitoring. Howard was removed , given TOV, Patient voided .  Secondary Diagnosis:	JAD (acute kidney injury)  Goal:	Preven the kidney function from worsening  Instructions for follow-up, activity and diet:	You initially had acute kidney injury likely due to underlying CHF, your losartan was stopped . BP stable . Losartan kept on hold, repeat BMP in 1 week.  Secondary Diagnosis:	Chronic obstructive pulmonary disease, unspecified COPD type  Goal:	prevent the exacerbation  Instructions for follow-up, activity and diet:	You had COPD , wheezing , got solumedrol IV , later changed to PO steroid, take steroid as prescribed . Monitor your sugars .  Secondary Diagnosis:	Schizoaffective disorder, bipolar type  Goal:	provide the supportive care  Instructions for follow-up, activity and diet:	Home medications continued  Secondary Diagnosis:	Essential hypertension  Goal:	keep the BP around 140/90  Instructions for follow-up, activity and diet:	BP stable , losartan kept on hold. restart as accordingly.  Secondary Diagnosis:	UTI (urinary tract infection)  Goal:	treat the infection  Instructions for follow-up, activity and diet:	You have UTI , given rocephin , got for 6 days , upon discharge take ceftin 250 BID x 1 more day  to complete total of 7 day course. Urine cultures negative  Secondary Diagnosis:	Diabetes  Goal:	keep the fingertsticks around 140 -160  Instructions for follow-up, activity and diet:	Your FS was high because of steroids , Lantus and Humalog given and dose adjusted accordingly. Continue with home medications metformin and insulin HSS.

## 2017-04-20 NOTE — DISCHARGE NOTE ADULT - PATIENT PORTAL LINK FT
“You can access the [facility] Patient Portal, offered by [facility], by registering with the following website: [email or web address]”

## 2017-04-20 NOTE — DISCHARGE NOTE ADULT - HOSPITAL COURSE
70yo female from [facility] schizoaffective disorder. COPD, HFpEF (last TTE unable to assess endocardium), morbid obesity, sent to ED for shortness of breath 2/2 acute on chronic diastolic heart failure 2/2 influenza B infection     Acute on chronic diastolic heart failure, patient appears fluid overloaded with rales and 'cardiac wheeze' on exam, congestion on CXR, bilateral pitting edema,  albeit normal BNP but patient is morbidly obese so BNP may not be diagnostic.  acute on chronic heart failure likely 2/2 influenza B infection. Given IV diuresis with 40 mg, patient diuresed well.   Daily weights, strict ins and outs measured. Placed on  telemetry, trended  troponin  No need for repeat TTE as recent TTE in 3/2017 - not successful given poor acoustic windows 2/2 obesity. For Influenza B  Tamiflu 75 bid x 5 days, droplet isolation. given. Also has Hyponatremia,  likely 2/2 ADH excess 2/2 heart failure - intravascular depleted but total body fluid overloaded. Na later improved Had   JAD (acute kidney injury) likely prerenal 2/2 acute on chronic heart failure. Holded arbs. Has Chronic obstructive pulmonary disease with wheezing likely 'cardiac' wheeze, given bronchodilators. steroids ,. For Schizoaffective disorder, bipolar type. continued with zyprexa. For Essential hypertension.  Plan: BP stable, home  amlodipine continued. Hold losartan 2/2 jad.  For Hypothyroidism, unspecified type.  Plan: stable, givenj  synthroid at home dose. lovenox for DVT prophylaxis given.     Patient initially fonseca placed for input and output monitoring, later fonseca removed , trial of voiding given . Patient -------------------. Patient will be discharged with lasix 40 daily, prednisone also given, Patient symptom improved . Stable to be discharged to home as per attending . 72yo female from [facility] schizoaffective disorder. COPD, HFpEF (last TTE unable to assess endocardium), morbid obesity, sent to ED for shortness of breath 2/2 acute on chronic diastolic heart failure 2/2 influenza B infection     Acute on chronic diastolic heart failure, patient appears fluid overloaded with rales and 'cardiac wheeze' on exam, congestion on CXR, bilateral pitting edema,  albeit normal BNP but patient is morbidly obese so BNP may not be diagnostic.  acute on chronic heart failure likely 2/2 influenza B infection. Given IV diuresis with 40 mg, patient diuresed well.   Daily weights, strict ins and outs measured. Placed on  telemetry, trended  troponin  No need for repeat TTE as recent TTE in 3/2017 - not successful given poor acoustic windows 2/2 obesity. For Influenza B  Tamiflu 75 bid x 5 days, droplet isolation. given. Also has Hyponatremia,  likely 2/2 ADH excess 2/2 heart failure - intravascular depleted but total body fluid overloaded. Na later improved Had   JAD (acute kidney injury) likely prerenal 2/2 acute on chronic heart failure. Holded arbs. Has Chronic obstructive pulmonary disease with wheezing likely 'cardiac' wheeze, given bronchodilators. steroids ,. For Schizoaffective disorder, bipolar type. continued with zyprexa. For Essential hypertension.  Plan: BP stable, home  amlodipine continued. Hold losartan 2/2 jad.  For Hypothyroidism, unspecified type.  Plan: stable, givenj  synthroid at home dose. lovenox for DVT prophylaxis given.     Patient initially fonseca placed for input and output monitoring, later fonseca removed , trial of voiding given . Patient voided.  Patient will be discharged with lasix 40 daily, prednisone also given, Patient symptom improved . Stable to be discharged to home as per attending .

## 2017-04-20 NOTE — DISCHARGE NOTE ADULT - MEDICATION SUMMARY - MEDICATIONS TO TAKE
I will START or STAY ON the medications listed below when I get home from the hospital:    predniSONE 20 mg oral tablet  -- 2 tab(s) by mouth once a day  -- It is very important that you take or use this exactly as directed.  Do not skip doses or discontinue unless directed by your doctor.  Obtain medical advice before taking any non-prescription drugs as some may affect the action of this medication.  Take with food or milk.    -- Indication: For Chronic obstructive pulmonary disease    naproxen 250 mg oral tablet  -- 1 tab(s) by mouth 2 times a day, As Needed  -- Indication: For pain control     aspirin 81 mg oral tablet  -- 1 tab(s) by mouth once a day  -- Indication: For Cardioprotective    enoxaparin  -- 40 microgram(s) subcutaneous once a day  -- Indication: For DVT prophyalxis    metFORMIN 500 mg oral tablet  -- 1 tab(s) by mouth 2 times a day  -- Indication: For Diabetes    insulin lispro 100 units/mL subcutaneous solution  -- 8 unit(s) subcutaneous 3 times a day (before meals)  -- Indication: For Diabetes    Crestor 5 mg oral tablet  -- 1 tab(s) by mouth once a day (at bedtime)  -- Indication: For Hyperlipidmia    RisperDAL Consta 25 mg/2 weeks intramuscular injection, extended release  --  intramuscular   -- Indication: For Schizoaffective disorder, bipolar type    OLANZapine 10 mg oral tablet  -- 1 tab(s) by mouth once a day  -- Indication: For Schizoaffective disorder, bipolar type    albuterol-ipratropium 2.5 mg-0.5 mg/3 mL inhalation solution  -- 3 milliliter(s) inhaled every 6 hours  -- Indication: For Chronic obstructive pulmonary disease    tiotropium 18 mcg inhalation capsule  -- 1 cap(s) inhaled once a day  -- Indication: For Chronic obstructive pulmonary disease    budesonide-formoterol 80 mcg-4.5 mcg/inh inhalation aerosol  -- 2 puff(s) inhaled 2 times a day  -- Indication: For Chronic obstructive pulmonary disease    Norvasc 10 mg oral tablet  -- 1 tab(s) by mouth once a day  -- Indication: For Hypertension    Ceftin 250 mg oral tablet  -- 1 tab(s) by mouth 2 times a day  -- Finish all this medication unless otherwise directed by prescriber.  Medication should be taken with plenty of water.  Take with food or milk.    -- Indication: For UTI (urinary tract infection)    ammonium lactate 12% topical lotion  -- 1 application on skin once a day  -- Indication: For topical agent    furosemide 20 mg oral tablet  -- 2 tab(s) by mouth once a day  -- Indication: For Heart failure    guaiFENesin 100 mg/5 mL oral liquid  -- 10 milliliter(s) by mouth every 6 hours, As Needed  -- Indication: For Cough     senna oral tablet  -- 2 tab(s) by mouth once a day (at bedtime)  -- Indication: For Constipation     docusate sodium 100 mg oral capsule  -- 1 cap(s) by mouth 3 times a day  -- Indication: For Constipation     montelukast 10 mg oral tablet  -- 1 tab(s) by mouth once a day  -- Indication: For Chronic obstructive pulmonary disease    Synthroid 150 mcg (0.15 mg) oral tablet  -- 1 tab(s) by mouth once a day  -- Indication: For Hypothyrodism    multivitamin  --     -- Indication: For Supplements

## 2017-04-21 LAB
ANION GAP SERPL CALC-SCNC: 9 MMOL/L — SIGNIFICANT CHANGE UP (ref 5–17)
ANION GAP SERPL CALC-SCNC: 9 MMOL/L — SIGNIFICANT CHANGE UP (ref 5–17)
BUN SERPL-MCNC: 57 MG/DL — HIGH (ref 7–18)
BUN SERPL-MCNC: 58 MG/DL — HIGH (ref 7–18)
CALCIUM SERPL-MCNC: 8.5 MG/DL — SIGNIFICANT CHANGE UP (ref 8.4–10.5)
CALCIUM SERPL-MCNC: 8.9 MG/DL — SIGNIFICANT CHANGE UP (ref 8.4–10.5)
CHLORIDE SERPL-SCNC: 83 MMOL/L — LOW (ref 96–108)
CHLORIDE SERPL-SCNC: 84 MMOL/L — LOW (ref 96–108)
CO2 SERPL-SCNC: 38 MMOL/L — HIGH (ref 22–31)
CO2 SERPL-SCNC: 40 MMOL/L — HIGH (ref 22–31)
CREAT SERPL-MCNC: 1.39 MG/DL — HIGH (ref 0.5–1.3)
CREAT SERPL-MCNC: 1.41 MG/DL — HIGH (ref 0.5–1.3)
CULTURE RESULTS: NO GROWTH — SIGNIFICANT CHANGE UP
GLUCOSE SERPL-MCNC: 326 MG/DL — HIGH (ref 70–99)
GLUCOSE SERPL-MCNC: 334 MG/DL — HIGH (ref 70–99)
HCT VFR BLD CALC: 33.4 % — LOW (ref 34.5–45)
HGB BLD-MCNC: 10.8 G/DL — LOW (ref 11.5–15.5)
MAGNESIUM SERPL-MCNC: 2.1 MG/DL — SIGNIFICANT CHANGE UP (ref 1.8–2.4)
MCHC RBC-ENTMCNC: 26.2 PG — LOW (ref 27–34)
MCHC RBC-ENTMCNC: 32.4 GM/DL — SIGNIFICANT CHANGE UP (ref 32–36)
MCV RBC AUTO: 80.7 FL — SIGNIFICANT CHANGE UP (ref 80–100)
PHOSPHATE SERPL-MCNC: 2.8 MG/DL — SIGNIFICANT CHANGE UP (ref 2.5–4.5)
PLATELET # BLD AUTO: 227 K/UL — SIGNIFICANT CHANGE UP (ref 150–400)
POTASSIUM SERPL-MCNC: 2.8 MMOL/L — CRITICAL LOW (ref 3.5–5.3)
POTASSIUM SERPL-MCNC: 3.2 MMOL/L — LOW (ref 3.5–5.3)
POTASSIUM SERPL-SCNC: 2.8 MMOL/L — CRITICAL LOW (ref 3.5–5.3)
POTASSIUM SERPL-SCNC: 3.2 MMOL/L — LOW (ref 3.5–5.3)
RBC # BLD: 4.14 M/UL — SIGNIFICANT CHANGE UP (ref 3.8–5.2)
RBC # FLD: 15.2 % — HIGH (ref 10.3–14.5)
SODIUM SERPL-SCNC: 130 MMOL/L — LOW (ref 135–145)
SODIUM SERPL-SCNC: 133 MMOL/L — LOW (ref 135–145)
SPECIMEN SOURCE: SIGNIFICANT CHANGE UP
WBC # BLD: 12 K/UL — HIGH (ref 3.8–10.5)
WBC # FLD AUTO: 12 K/UL — HIGH (ref 3.8–10.5)

## 2017-04-21 RX ORDER — FUROSEMIDE 40 MG
40 TABLET ORAL DAILY
Refills: 0 | Status: DISCONTINUED | OUTPATIENT
Start: 2017-04-21 | End: 2017-04-24

## 2017-04-21 RX ORDER — KETOROLAC TROMETHAMINE 30 MG/ML
15 SYRINGE (ML) INJECTION ONCE
Refills: 0 | Status: DISCONTINUED | OUTPATIENT
Start: 2017-04-21 | End: 2017-04-21

## 2017-04-21 RX ORDER — POTASSIUM CHLORIDE 20 MEQ
40 PACKET (EA) ORAL EVERY 4 HOURS
Refills: 0 | Status: DISCONTINUED | OUTPATIENT
Start: 2017-04-21 | End: 2017-04-21

## 2017-04-21 RX ORDER — INSULIN GLARGINE 100 [IU]/ML
20 INJECTION, SOLUTION SUBCUTANEOUS AT BEDTIME
Refills: 0 | Status: DISCONTINUED | OUTPATIENT
Start: 2017-04-21 | End: 2017-04-23

## 2017-04-21 RX ORDER — INSULIN LISPRO 100/ML
5 VIAL (ML) SUBCUTANEOUS
Refills: 0 | Status: DISCONTINUED | OUTPATIENT
Start: 2017-04-21 | End: 2017-04-23

## 2017-04-21 RX ORDER — INSULIN GLARGINE 100 [IU]/ML
12 INJECTION, SOLUTION SUBCUTANEOUS AT BEDTIME
Refills: 0 | Status: DISCONTINUED | OUTPATIENT
Start: 2017-04-21 | End: 2017-04-21

## 2017-04-21 RX ORDER — INSULIN GLARGINE 100 [IU]/ML
22 INJECTION, SOLUTION SUBCUTANEOUS AT BEDTIME
Refills: 0 | Status: DISCONTINUED | OUTPATIENT
Start: 2017-04-21 | End: 2017-04-21

## 2017-04-21 RX ORDER — POTASSIUM CHLORIDE 20 MEQ
40 PACKET (EA) ORAL ONCE
Refills: 0 | Status: COMPLETED | OUTPATIENT
Start: 2017-04-21 | End: 2017-04-21

## 2017-04-21 RX ORDER — POTASSIUM CHLORIDE 20 MEQ
10 PACKET (EA) ORAL DAILY
Refills: 0 | Status: DISCONTINUED | OUTPATIENT
Start: 2017-04-21 | End: 2017-04-21

## 2017-04-21 RX ORDER — POTASSIUM CHLORIDE 20 MEQ
40 PACKET (EA) ORAL ONCE
Refills: 0 | Status: COMPLETED | OUTPATIENT
Start: 2017-04-21 | End: 2017-04-22

## 2017-04-21 RX ORDER — INSULIN LISPRO 100/ML
7 VIAL (ML) SUBCUTANEOUS
Refills: 0 | Status: DISCONTINUED | OUTPATIENT
Start: 2017-04-21 | End: 2017-04-21

## 2017-04-21 RX ORDER — POTASSIUM CHLORIDE 20 MEQ
10 PACKET (EA) ORAL
Refills: 0 | Status: COMPLETED | OUTPATIENT
Start: 2017-04-21 | End: 2017-04-21

## 2017-04-21 RX ADMIN — Medication 650 MILLIGRAM(S): at 00:19

## 2017-04-21 RX ADMIN — Medication 100 MILLIEQUIVALENT(S): at 15:34

## 2017-04-21 RX ADMIN — Medication 100 MILLIEQUIVALENT(S): at 12:44

## 2017-04-21 RX ADMIN — Medication 40 MILLIEQUIVALENT(S): at 15:34

## 2017-04-21 RX ADMIN — Medication 3 MILLILITER(S): at 21:20

## 2017-04-21 RX ADMIN — CEFTRIAXONE 100 GRAM(S): 500 INJECTION, POWDER, FOR SOLUTION INTRAMUSCULAR; INTRAVENOUS at 05:37

## 2017-04-21 RX ADMIN — Medication 40 MILLIGRAM(S): at 12:56

## 2017-04-21 RX ADMIN — Medication 40 MILLIGRAM(S): at 05:45

## 2017-04-21 RX ADMIN — INSULIN GLARGINE 20 UNIT(S): 100 INJECTION, SOLUTION SUBCUTANEOUS at 21:49

## 2017-04-21 RX ADMIN — Medication 4: at 12:45

## 2017-04-21 RX ADMIN — Medication 10 MILLIEQUIVALENT(S): at 12:44

## 2017-04-21 RX ADMIN — Medication 40 MILLIGRAM(S): at 15:43

## 2017-04-21 RX ADMIN — Medication 15 MILLIGRAM(S): at 00:40

## 2017-04-21 RX ADMIN — Medication 100 MILLIEQUIVALENT(S): at 13:52

## 2017-04-21 RX ADMIN — Medication 3 MILLILITER(S): at 14:45

## 2017-04-21 RX ADMIN — Medication 3: at 21:49

## 2017-04-21 RX ADMIN — Medication 5 UNIT(S): at 17:25

## 2017-04-21 RX ADMIN — Medication 75 MILLIGRAM(S): at 05:37

## 2017-04-21 RX ADMIN — ENOXAPARIN SODIUM 40 MILLIGRAM(S): 100 INJECTION SUBCUTANEOUS at 12:46

## 2017-04-21 RX ADMIN — Medication 3 MILLILITER(S): at 09:09

## 2017-04-21 RX ADMIN — Medication 150 MICROGRAM(S): at 05:37

## 2017-04-21 RX ADMIN — Medication 3 MILLILITER(S): at 02:06

## 2017-04-21 RX ADMIN — MONTELUKAST 10 MILLIGRAM(S): 4 TABLET, CHEWABLE ORAL at 21:49

## 2017-04-21 RX ADMIN — Medication 81 MILLIGRAM(S): at 12:45

## 2017-04-21 RX ADMIN — Medication 15 MILLIGRAM(S): at 01:18

## 2017-04-21 RX ADMIN — Medication 3: at 17:25

## 2017-04-21 RX ADMIN — Medication 5 UNIT(S): at 12:46

## 2017-04-21 RX ADMIN — AMLODIPINE BESYLATE 10 MILLIGRAM(S): 2.5 TABLET ORAL at 05:37

## 2017-04-21 RX ADMIN — Medication 40 MILLIGRAM(S): at 05:38

## 2017-04-21 RX ADMIN — Medication 5: at 08:22

## 2017-04-21 RX ADMIN — OLANZAPINE 10 MILLIGRAM(S): 15 TABLET, FILM COATED ORAL at 12:59

## 2017-04-21 RX ADMIN — Medication 75 MILLIGRAM(S): at 17:25

## 2017-04-21 RX ADMIN — Medication 3 MILLIGRAM(S): at 00:18

## 2017-04-22 LAB
ANION GAP SERPL CALC-SCNC: 7 MMOL/L — SIGNIFICANT CHANGE UP (ref 5–17)
BUN SERPL-MCNC: 54 MG/DL — HIGH (ref 7–18)
CALCIUM SERPL-MCNC: 8.9 MG/DL — SIGNIFICANT CHANGE UP (ref 8.4–10.5)
CHLORIDE SERPL-SCNC: 86 MMOL/L — LOW (ref 96–108)
CO2 SERPL-SCNC: 40 MMOL/L — HIGH (ref 22–31)
CREAT SERPL-MCNC: 1.06 MG/DL — SIGNIFICANT CHANGE UP (ref 0.5–1.3)
GLUCOSE SERPL-MCNC: 235 MG/DL — HIGH (ref 70–99)
HCT VFR BLD CALC: 33.4 % — LOW (ref 34.5–45)
HGB BLD-MCNC: 10.9 G/DL — LOW (ref 11.5–15.5)
MCHC RBC-ENTMCNC: 26.2 PG — LOW (ref 27–34)
MCHC RBC-ENTMCNC: 32.7 GM/DL — SIGNIFICANT CHANGE UP (ref 32–36)
MCV RBC AUTO: 79.9 FL — LOW (ref 80–100)
PLATELET # BLD AUTO: 262 K/UL — SIGNIFICANT CHANGE UP (ref 150–400)
POTASSIUM SERPL-MCNC: 3 MMOL/L — LOW (ref 3.5–5.3)
POTASSIUM SERPL-SCNC: 3 MMOL/L — LOW (ref 3.5–5.3)
RBC # BLD: 4.18 M/UL — SIGNIFICANT CHANGE UP (ref 3.8–5.2)
RBC # FLD: 15 % — HIGH (ref 10.3–14.5)
SODIUM SERPL-SCNC: 133 MMOL/L — LOW (ref 135–145)
WBC # BLD: 12.3 K/UL — HIGH (ref 3.8–10.5)
WBC # FLD AUTO: 12.3 K/UL — HIGH (ref 3.8–10.5)

## 2017-04-22 RX ORDER — IPRATROPIUM/ALBUTEROL SULFATE 18-103MCG
3 AEROSOL WITH ADAPTER (GRAM) INHALATION ONCE
Refills: 0 | Status: COMPLETED | OUTPATIENT
Start: 2017-04-22 | End: 2017-04-22

## 2017-04-22 RX ADMIN — Medication 3 MILLILITER(S): at 08:45

## 2017-04-22 RX ADMIN — Medication 150 MICROGRAM(S): at 06:37

## 2017-04-22 RX ADMIN — Medication 81 MILLIGRAM(S): at 13:04

## 2017-04-22 RX ADMIN — ENOXAPARIN SODIUM 40 MILLIGRAM(S): 100 INJECTION SUBCUTANEOUS at 13:04

## 2017-04-22 RX ADMIN — Medication 5 UNIT(S): at 17:20

## 2017-04-22 RX ADMIN — Medication 3 MILLILITER(S): at 21:48

## 2017-04-22 RX ADMIN — MONTELUKAST 10 MILLIGRAM(S): 4 TABLET, CHEWABLE ORAL at 21:39

## 2017-04-22 RX ADMIN — Medication 1: at 21:39

## 2017-04-22 RX ADMIN — AMLODIPINE BESYLATE 10 MILLIGRAM(S): 2.5 TABLET ORAL at 06:36

## 2017-04-22 RX ADMIN — Medication 5 UNIT(S): at 13:03

## 2017-04-22 RX ADMIN — Medication 40 MILLIGRAM(S): at 06:37

## 2017-04-22 RX ADMIN — Medication 5 UNIT(S): at 08:36

## 2017-04-22 RX ADMIN — Medication 40 MILLIGRAM(S): at 06:36

## 2017-04-22 RX ADMIN — Medication 3 MILLILITER(S): at 20:47

## 2017-04-22 RX ADMIN — Medication 3 MILLIGRAM(S): at 21:39

## 2017-04-22 RX ADMIN — Medication 40 MILLIEQUIVALENT(S): at 06:37

## 2017-04-22 RX ADMIN — Medication 2: at 08:36

## 2017-04-22 RX ADMIN — Medication 5: at 17:20

## 2017-04-22 RX ADMIN — Medication 3: at 13:03

## 2017-04-22 RX ADMIN — CEFTRIAXONE 100 GRAM(S): 500 INJECTION, POWDER, FOR SOLUTION INTRAMUSCULAR; INTRAVENOUS at 06:37

## 2017-04-22 RX ADMIN — Medication 75 MILLIGRAM(S): at 06:37

## 2017-04-22 RX ADMIN — INSULIN GLARGINE 20 UNIT(S): 100 INJECTION, SOLUTION SUBCUTANEOUS at 21:39

## 2017-04-22 RX ADMIN — Medication 3 MILLILITER(S): at 15:41

## 2017-04-22 RX ADMIN — Medication 75 MILLIGRAM(S): at 17:42

## 2017-04-23 LAB
ANION GAP SERPL CALC-SCNC: 9 MMOL/L — SIGNIFICANT CHANGE UP (ref 5–17)
BUN SERPL-MCNC: 48 MG/DL — HIGH (ref 7–18)
CALCIUM SERPL-MCNC: 8.6 MG/DL — SIGNIFICANT CHANGE UP (ref 8.4–10.5)
CHLORIDE SERPL-SCNC: 85 MMOL/L — LOW (ref 96–108)
CO2 SERPL-SCNC: 40 MMOL/L — HIGH (ref 22–31)
CREAT SERPL-MCNC: 0.98 MG/DL — SIGNIFICANT CHANGE UP (ref 0.5–1.3)
GLUCOSE SERPL-MCNC: 164 MG/DL — HIGH (ref 70–99)
HCT VFR BLD CALC: 33.5 % — LOW (ref 34.5–45)
HGB BLD-MCNC: 11 G/DL — LOW (ref 11.5–15.5)
MCHC RBC-ENTMCNC: 26.4 PG — LOW (ref 27–34)
MCHC RBC-ENTMCNC: 32.7 GM/DL — SIGNIFICANT CHANGE UP (ref 32–36)
MCV RBC AUTO: 80.9 FL — SIGNIFICANT CHANGE UP (ref 80–100)
PLATELET # BLD AUTO: 278 K/UL — SIGNIFICANT CHANGE UP (ref 150–400)
POTASSIUM SERPL-MCNC: 3.1 MMOL/L — LOW (ref 3.5–5.3)
POTASSIUM SERPL-SCNC: 3.1 MMOL/L — LOW (ref 3.5–5.3)
RBC # BLD: 4.14 M/UL — SIGNIFICANT CHANGE UP (ref 3.8–5.2)
RBC # FLD: 14.9 % — HIGH (ref 10.3–14.5)
SODIUM SERPL-SCNC: 134 MMOL/L — LOW (ref 135–145)
WBC # BLD: 11.2 K/UL — HIGH (ref 3.8–10.5)
WBC # FLD AUTO: 11.2 K/UL — HIGH (ref 3.8–10.5)

## 2017-04-23 RX ORDER — INSULIN GLARGINE 100 [IU]/ML
25 INJECTION, SOLUTION SUBCUTANEOUS AT BEDTIME
Refills: 0 | Status: DISCONTINUED | OUTPATIENT
Start: 2017-04-23 | End: 2017-04-24

## 2017-04-23 RX ORDER — POTASSIUM CHLORIDE 20 MEQ
40 PACKET (EA) ORAL EVERY 4 HOURS
Refills: 0 | Status: COMPLETED | OUTPATIENT
Start: 2017-04-23 | End: 2017-04-23

## 2017-04-23 RX ORDER — INSULIN LISPRO 100/ML
7 VIAL (ML) SUBCUTANEOUS
Refills: 0 | Status: DISCONTINUED | OUTPATIENT
Start: 2017-04-23 | End: 2017-04-24

## 2017-04-23 RX ORDER — TRAMADOL HYDROCHLORIDE 50 MG/1
50 TABLET ORAL ONCE
Refills: 0 | Status: DISCONTINUED | OUTPATIENT
Start: 2017-04-23 | End: 2017-04-23

## 2017-04-23 RX ORDER — POTASSIUM CHLORIDE 20 MEQ
40 PACKET (EA) ORAL ONCE
Refills: 0 | Status: COMPLETED | OUTPATIENT
Start: 2017-04-23 | End: 2017-04-23

## 2017-04-23 RX ADMIN — Medication 75 MILLIGRAM(S): at 18:08

## 2017-04-23 RX ADMIN — Medication 81 MILLIGRAM(S): at 12:31

## 2017-04-23 RX ADMIN — Medication 150 MICROGRAM(S): at 05:13

## 2017-04-23 RX ADMIN — Medication 100 MILLIGRAM(S): at 05:17

## 2017-04-23 RX ADMIN — Medication 1: at 08:26

## 2017-04-23 RX ADMIN — CEFTRIAXONE 100 GRAM(S): 500 INJECTION, POWDER, FOR SOLUTION INTRAMUSCULAR; INTRAVENOUS at 05:30

## 2017-04-23 RX ADMIN — Medication 40 MILLIEQUIVALENT(S): at 12:31

## 2017-04-23 RX ADMIN — AMLODIPINE BESYLATE 10 MILLIGRAM(S): 2.5 TABLET ORAL at 05:15

## 2017-04-23 RX ADMIN — Medication 7 UNIT(S): at 12:33

## 2017-04-23 RX ADMIN — Medication 7 UNIT(S): at 18:09

## 2017-04-23 RX ADMIN — Medication 3 MILLILITER(S): at 20:01

## 2017-04-23 RX ADMIN — Medication 3: at 21:46

## 2017-04-23 RX ADMIN — Medication 30 MILLIGRAM(S): at 05:19

## 2017-04-23 RX ADMIN — MONTELUKAST 10 MILLIGRAM(S): 4 TABLET, CHEWABLE ORAL at 21:48

## 2017-04-23 RX ADMIN — ENOXAPARIN SODIUM 40 MILLIGRAM(S): 100 INJECTION SUBCUTANEOUS at 12:35

## 2017-04-23 RX ADMIN — TRAMADOL HYDROCHLORIDE 50 MILLIGRAM(S): 50 TABLET ORAL at 09:51

## 2017-04-23 RX ADMIN — Medication 1: at 18:08

## 2017-04-23 RX ADMIN — TRAMADOL HYDROCHLORIDE 50 MILLIGRAM(S): 50 TABLET ORAL at 10:30

## 2017-04-23 RX ADMIN — Medication 40 MILLIGRAM(S): at 18:12

## 2017-04-23 RX ADMIN — Medication 4: at 12:32

## 2017-04-23 RX ADMIN — Medication 40 MILLIGRAM(S): at 05:13

## 2017-04-23 RX ADMIN — Medication 75 MILLIGRAM(S): at 05:13

## 2017-04-23 RX ADMIN — Medication 40 MILLIEQUIVALENT(S): at 08:28

## 2017-04-23 RX ADMIN — INSULIN GLARGINE 25 UNIT(S): 100 INJECTION, SOLUTION SUBCUTANEOUS at 21:48

## 2017-04-23 RX ADMIN — Medication 40 MILLIEQUIVALENT(S): at 05:19

## 2017-04-23 RX ADMIN — Medication 3 MILLILITER(S): at 14:59

## 2017-04-23 RX ADMIN — Medication 3 MILLILITER(S): at 09:24

## 2017-04-23 NOTE — PHYSICAL THERAPY INITIAL EVALUATION ADULT - LEVEL OF INDEPENDENCE: STAIR NEGOTIATION, REHAB EVAL
Unable to safely assess Pt. on stairs at this time. Pt. does not exhibit appropriate pre-requisite skills to safely negotiate unlevel surfaces which will put patient at significant risk for falls and injury.

## 2017-04-23 NOTE — PHYSICAL THERAPY INITIAL EVALUATION ADULT - CRITERIA FOR SKILLED THERAPEUTIC INTERVENTIONS
impairments found/functional limitations in following categories/risk reduction/prevention/rehab potential/therapy frequency/predicted duration of therapy intervention/anticipated discharge recommendation

## 2017-04-23 NOTE — PHYSICAL THERAPY INITIAL EVALUATION ADULT - LIVES WITH, PROFILE
Pt. is from [facility], no stairs required. Pt. unable to remember additional details of social hx

## 2017-04-23 NOTE — PHYSICAL THERAPY INITIAL EVALUATION ADULT - GENERAL OBSERVATIONS, REHAB EVAL
Consult received, chart reviewed. Patient received supine in bed, NAD, +NC, +fonseca. Patient agreed to EVALUATION from Physical Therapist. Pt. is poor historian

## 2017-04-23 NOTE — PHYSICAL THERAPY INITIAL EVALUATION ADULT - ACTIVE RANGE OF MOTION EXAMINATION, REHAB EVAL
except b/l hips and shoulder 2/3 range/bilateral upper extremity Active ROM was WFL (within functional limits)/bilateral  lower extremity Active ROM was WFL (within functional limits)

## 2017-04-24 VITALS
SYSTOLIC BLOOD PRESSURE: 120 MMHG | DIASTOLIC BLOOD PRESSURE: 67 MMHG | TEMPERATURE: 97 F | OXYGEN SATURATION: 100 % | HEART RATE: 106 BPM | RESPIRATION RATE: 19 BRPM

## 2017-04-24 LAB
ANION GAP SERPL CALC-SCNC: 6 MMOL/L — SIGNIFICANT CHANGE UP (ref 5–17)
BUN SERPL-MCNC: 33 MG/DL — HIGH (ref 7–18)
CALCIUM SERPL-MCNC: 9.2 MG/DL — SIGNIFICANT CHANGE UP (ref 8.4–10.5)
CHLORIDE SERPL-SCNC: 91 MMOL/L — LOW (ref 96–108)
CO2 SERPL-SCNC: 39 MMOL/L — HIGH (ref 22–31)
CREAT SERPL-MCNC: 0.78 MG/DL — SIGNIFICANT CHANGE UP (ref 0.5–1.3)
GLUCOSE SERPL-MCNC: 194 MG/DL — HIGH (ref 70–99)
HCT VFR BLD CALC: 36.4 % — SIGNIFICANT CHANGE UP (ref 34.5–45)
HGB BLD-MCNC: 11.5 G/DL — SIGNIFICANT CHANGE UP (ref 11.5–15.5)
MAGNESIUM SERPL-MCNC: 2.5 MG/DL — HIGH (ref 1.8–2.4)
MCHC RBC-ENTMCNC: 26 PG — LOW (ref 27–34)
MCHC RBC-ENTMCNC: 31.6 GM/DL — LOW (ref 32–36)
MCV RBC AUTO: 82.3 FL — SIGNIFICANT CHANGE UP (ref 80–100)
PHOSPHATE SERPL-MCNC: 2.4 MG/DL — LOW (ref 2.5–4.5)
PLATELET # BLD AUTO: 296 K/UL — SIGNIFICANT CHANGE UP (ref 150–400)
POTASSIUM SERPL-MCNC: 4 MMOL/L — SIGNIFICANT CHANGE UP (ref 3.5–5.3)
POTASSIUM SERPL-SCNC: 4 MMOL/L — SIGNIFICANT CHANGE UP (ref 3.5–5.3)
RBC # BLD: 4.42 M/UL — SIGNIFICANT CHANGE UP (ref 3.8–5.2)
RBC # FLD: 15.3 % — HIGH (ref 10.3–14.5)
SODIUM SERPL-SCNC: 136 MMOL/L — SIGNIFICANT CHANGE UP (ref 135–145)
WBC # BLD: 9.5 K/UL — SIGNIFICANT CHANGE UP (ref 3.8–10.5)
WBC # FLD AUTO: 9.5 K/UL — SIGNIFICANT CHANGE UP (ref 3.8–10.5)

## 2017-04-24 PROCEDURE — 83930 ASSAY OF BLOOD OSMOLALITY: CPT

## 2017-04-24 PROCEDURE — 80053 COMPREHEN METABOLIC PANEL: CPT

## 2017-04-24 PROCEDURE — 97162 PT EVAL MOD COMPLEX 30 MIN: CPT

## 2017-04-24 PROCEDURE — 81001 URINALYSIS AUTO W/SCOPE: CPT

## 2017-04-24 PROCEDURE — 71045 X-RAY EXAM CHEST 1 VIEW: CPT

## 2017-04-24 PROCEDURE — 82272 OCCULT BLD FECES 1-3 TESTS: CPT

## 2017-04-24 PROCEDURE — 82550 ASSAY OF CK (CPK): CPT

## 2017-04-24 PROCEDURE — 84100 ASSAY OF PHOSPHORUS: CPT

## 2017-04-24 PROCEDURE — 83550 IRON BINDING TEST: CPT

## 2017-04-24 PROCEDURE — 83735 ASSAY OF MAGNESIUM: CPT

## 2017-04-24 PROCEDURE — 99285 EMERGENCY DEPT VISIT HI MDM: CPT | Mod: 25

## 2017-04-24 PROCEDURE — 87486 CHLMYD PNEUM DNA AMP PROBE: CPT

## 2017-04-24 PROCEDURE — 87798 DETECT AGENT NOS DNA AMP: CPT

## 2017-04-24 PROCEDURE — 84466 ASSAY OF TRANSFERRIN: CPT

## 2017-04-24 PROCEDURE — 83880 ASSAY OF NATRIURETIC PEPTIDE: CPT

## 2017-04-24 PROCEDURE — 80048 BASIC METABOLIC PNL TOTAL CA: CPT

## 2017-04-24 PROCEDURE — 85027 COMPLETE CBC AUTOMATED: CPT

## 2017-04-24 PROCEDURE — 84156 ASSAY OF PROTEIN URINE: CPT

## 2017-04-24 PROCEDURE — 94660 CPAP INITIATION&MGMT: CPT

## 2017-04-24 PROCEDURE — 87581 M.PNEUMON DNA AMP PROBE: CPT

## 2017-04-24 PROCEDURE — 93005 ELECTROCARDIOGRAM TRACING: CPT

## 2017-04-24 PROCEDURE — 87086 URINE CULTURE/COLONY COUNT: CPT

## 2017-04-24 PROCEDURE — 82570 ASSAY OF URINE CREATININE: CPT

## 2017-04-24 PROCEDURE — 94640 AIRWAY INHALATION TREATMENT: CPT

## 2017-04-24 PROCEDURE — 82728 ASSAY OF FERRITIN: CPT

## 2017-04-24 PROCEDURE — 85045 AUTOMATED RETICULOCYTE COUNT: CPT

## 2017-04-24 PROCEDURE — 82803 BLOOD GASES ANY COMBINATION: CPT

## 2017-04-24 PROCEDURE — 83036 HEMOGLOBIN GLYCOSYLATED A1C: CPT

## 2017-04-24 PROCEDURE — 84443 ASSAY THYROID STIM HORMONE: CPT

## 2017-04-24 PROCEDURE — 84484 ASSAY OF TROPONIN QUANT: CPT

## 2017-04-24 PROCEDURE — 84300 ASSAY OF URINE SODIUM: CPT

## 2017-04-24 PROCEDURE — 97530 THERAPEUTIC ACTIVITIES: CPT

## 2017-04-24 PROCEDURE — 87633 RESP VIRUS 12-25 TARGETS: CPT

## 2017-04-24 PROCEDURE — 82553 CREATINE MB FRACTION: CPT

## 2017-04-24 PROCEDURE — 96374 THER/PROPH/DIAG INJ IV PUSH: CPT

## 2017-04-24 RX ORDER — INSULIN GLARGINE 100 [IU]/ML
27 INJECTION, SOLUTION SUBCUTANEOUS AT BEDTIME
Refills: 0 | Status: DISCONTINUED | OUTPATIENT
Start: 2017-04-24 | End: 2017-04-24

## 2017-04-24 RX ORDER — CEFUROXIME AXETIL 250 MG
1 TABLET ORAL
Qty: 2 | Refills: 0
Start: 2017-04-24 | End: 2017-04-25

## 2017-04-24 RX ORDER — INSULIN LISPRO 100/ML
8 VIAL (ML) SUBCUTANEOUS
Qty: 0 | Refills: 0 | DISCHARGE
Start: 2017-04-24

## 2017-04-24 RX ORDER — INSULIN LISPRO 100/ML
8 VIAL (ML) SUBCUTANEOUS
Refills: 0 | Status: DISCONTINUED | OUTPATIENT
Start: 2017-04-24 | End: 2017-04-24

## 2017-04-24 RX ADMIN — Medication 3 MILLIGRAM(S): at 00:54

## 2017-04-24 RX ADMIN — AMLODIPINE BESYLATE 10 MILLIGRAM(S): 2.5 TABLET ORAL at 05:46

## 2017-04-24 RX ADMIN — Medication 81 MILLIGRAM(S): at 12:24

## 2017-04-24 RX ADMIN — Medication 150 MICROGRAM(S): at 05:46

## 2017-04-24 RX ADMIN — Medication 8 UNIT(S): at 16:55

## 2017-04-24 RX ADMIN — CEFTRIAXONE 100 GRAM(S): 500 INJECTION, POWDER, FOR SOLUTION INTRAMUSCULAR; INTRAVENOUS at 05:46

## 2017-04-24 RX ADMIN — Medication 2: at 07:46

## 2017-04-24 RX ADMIN — Medication 40 MILLIGRAM(S): at 05:46

## 2017-04-24 RX ADMIN — Medication 2: at 12:24

## 2017-04-24 RX ADMIN — Medication 8 UNIT(S): at 12:24

## 2017-04-24 RX ADMIN — Medication 3 MILLILITER(S): at 15:03

## 2017-04-24 RX ADMIN — ENOXAPARIN SODIUM 40 MILLIGRAM(S): 100 INJECTION SUBCUTANEOUS at 12:24

## 2017-04-24 RX ADMIN — Medication 2: at 16:55

## 2017-04-24 RX ADMIN — Medication 7 UNIT(S): at 07:47

## 2017-04-24 RX ADMIN — Medication 3 MILLILITER(S): at 09:17

## 2017-04-27 DIAGNOSIS — Z79.4 LONG TERM (CURRENT) USE OF INSULIN: ICD-10-CM

## 2017-04-27 DIAGNOSIS — J44.9 CHRONIC OBSTRUCTIVE PULMONARY DISEASE, UNSPECIFIED: ICD-10-CM

## 2017-04-27 DIAGNOSIS — E66.01 MORBID (SEVERE) OBESITY DUE TO EXCESS CALORIES: ICD-10-CM

## 2017-04-27 DIAGNOSIS — Z79.51 LONG TERM (CURRENT) USE OF INHALED STEROIDS: ICD-10-CM

## 2017-04-27 DIAGNOSIS — E87.1 HYPO-OSMOLALITY AND HYPONATREMIA: ICD-10-CM

## 2017-04-27 DIAGNOSIS — N17.9 ACUTE KIDNEY FAILURE, UNSPECIFIED: ICD-10-CM

## 2017-04-27 DIAGNOSIS — N39.0 URINARY TRACT INFECTION, SITE NOT SPECIFIED: ICD-10-CM

## 2017-04-27 DIAGNOSIS — Z79.82 LONG TERM (CURRENT) USE OF ASPIRIN: ICD-10-CM

## 2017-04-27 DIAGNOSIS — J44.1 CHRONIC OBSTRUCTIVE PULMONARY DISEASE WITH (ACUTE) EXACERBATION: ICD-10-CM

## 2017-04-27 DIAGNOSIS — I11.0 HYPERTENSIVE HEART DISEASE WITH HEART FAILURE: ICD-10-CM

## 2017-04-27 DIAGNOSIS — I50.33 ACUTE ON CHRONIC DIASTOLIC (CONGESTIVE) HEART FAILURE: ICD-10-CM

## 2017-04-27 DIAGNOSIS — E78.5 HYPERLIPIDEMIA, UNSPECIFIED: ICD-10-CM

## 2017-04-27 DIAGNOSIS — J10.1 INFLUENZA DUE TO OTHER IDENTIFIED INFLUENZA VIRUS WITH OTHER RESPIRATORY MANIFESTATIONS: ICD-10-CM

## 2017-04-27 DIAGNOSIS — E87.6 HYPOKALEMIA: ICD-10-CM

## 2017-04-27 DIAGNOSIS — F25.0 SCHIZOAFFECTIVE DISORDER, BIPOLAR TYPE: ICD-10-CM

## 2017-04-27 DIAGNOSIS — E11.9 TYPE 2 DIABETES MELLITUS WITHOUT COMPLICATIONS: ICD-10-CM

## 2017-04-27 DIAGNOSIS — E03.9 HYPOTHYROIDISM, UNSPECIFIED: ICD-10-CM

## 2018-08-17 NOTE — PATIENT PROFILE ADULT. - FUNCTIONAL SCREEN CURRENT LEVEL: EATING, MLM
August 17, 2018          Dear Sir ameena Hester:    I am writing in re: to Mary Dixon, who underwent laparoscopic cholecystectomy on August 1 at [facility] and was later hospitalized under my care from August 11-12.      I saw Mary rachel today in clinic and she continues to have significant myofascial discomfort.  I am concerned that returning to work at this time would jeopardize her recovery.  As such, she is not medically cleared to return to work until August 27th.    Please do not hesitate to call me with any questions.    Sincerely,          Violeta Diaz MD    Critical Care & Acute Care Surgery  [address] [place] [address] [address]  [place] [address]    (P) [phone] (F) [phone]   (0) independent

## 2018-10-26 ENCOUNTER — INPATIENT (INPATIENT)
Facility: HOSPITAL | Age: 73
LOS: 12 days | Discharge: NURSING, REHAB OR LONG-TERM CARE | DRG: 871 | End: 2018-11-08
Attending: INTERNAL MEDICINE | Admitting: INTERNAL MEDICINE
Payer: MEDICARE

## 2018-10-26 VITALS
SYSTOLIC BLOOD PRESSURE: 90 MMHG | RESPIRATION RATE: 18 BRPM | OXYGEN SATURATION: 90 % | WEIGHT: 235.01 LBS | TEMPERATURE: 98 F | DIASTOLIC BLOOD PRESSURE: 56 MMHG | HEIGHT: 68 IN | HEART RATE: 126 BPM

## 2018-10-26 DIAGNOSIS — E78.00 PURE HYPERCHOLESTEROLEMIA, UNSPECIFIED: ICD-10-CM

## 2018-10-26 DIAGNOSIS — I10 ESSENTIAL (PRIMARY) HYPERTENSION: ICD-10-CM

## 2018-10-26 DIAGNOSIS — F31.9 BIPOLAR DISORDER, UNSPECIFIED: ICD-10-CM

## 2018-10-26 DIAGNOSIS — Z29.9 ENCOUNTER FOR PROPHYLACTIC MEASURES, UNSPECIFIED: ICD-10-CM

## 2018-10-26 DIAGNOSIS — E11.9 TYPE 2 DIABETES MELLITUS WITHOUT COMPLICATIONS: ICD-10-CM

## 2018-10-26 DIAGNOSIS — I50.32 CHRONIC DIASTOLIC (CONGESTIVE) HEART FAILURE: ICD-10-CM

## 2018-10-26 DIAGNOSIS — A41.9 SEPSIS, UNSPECIFIED ORGANISM: ICD-10-CM

## 2018-10-26 DIAGNOSIS — E03.9 HYPOTHYROIDISM, UNSPECIFIED: ICD-10-CM

## 2018-10-26 DIAGNOSIS — M19.90 UNSPECIFIED OSTEOARTHRITIS, UNSPECIFIED SITE: ICD-10-CM

## 2018-10-26 DIAGNOSIS — J44.9 CHRONIC OBSTRUCTIVE PULMONARY DISEASE, UNSPECIFIED: ICD-10-CM

## 2018-10-26 LAB
ALBUMIN SERPL ELPH-MCNC: 3.2 G/DL — LOW (ref 3.5–5)
ALP SERPL-CCNC: 77 U/L — SIGNIFICANT CHANGE UP (ref 40–120)
ALT FLD-CCNC: 20 U/L DA — SIGNIFICANT CHANGE UP (ref 10–60)
ANION GAP SERPL CALC-SCNC: 7 MMOL/L — SIGNIFICANT CHANGE UP (ref 5–17)
ANISOCYTOSIS BLD QL: SLIGHT — SIGNIFICANT CHANGE UP
APPEARANCE UR: CLEAR — SIGNIFICANT CHANGE UP
APTT BLD: 41.8 SEC — HIGH (ref 27.5–37.4)
AST SERPL-CCNC: 21 U/L — SIGNIFICANT CHANGE UP (ref 10–40)
BACTERIA # UR AUTO: ABNORMAL /HPF
BILIRUB SERPL-MCNC: 0.6 MG/DL — SIGNIFICANT CHANGE UP (ref 0.2–1.2)
BILIRUB UR-MCNC: NEGATIVE — SIGNIFICANT CHANGE UP
BUN SERPL-MCNC: 19 MG/DL — HIGH (ref 7–18)
CALCIUM SERPL-MCNC: 9.4 MG/DL — SIGNIFICANT CHANGE UP (ref 8.4–10.5)
CHLORIDE SERPL-SCNC: 103 MMOL/L — SIGNIFICANT CHANGE UP (ref 96–108)
CO2 SERPL-SCNC: 30 MMOL/L — SIGNIFICANT CHANGE UP (ref 22–31)
COLOR SPEC: YELLOW — SIGNIFICANT CHANGE UP
CREAT SERPL-MCNC: 1.16 MG/DL — SIGNIFICANT CHANGE UP (ref 0.5–1.3)
DIFF PNL FLD: ABNORMAL
EPI CELLS # UR: NEGATIVE /HPF — SIGNIFICANT CHANGE UP
GLUCOSE SERPL-MCNC: 156 MG/DL — HIGH (ref 70–99)
GLUCOSE UR QL: NEGATIVE — SIGNIFICANT CHANGE UP
HCT VFR BLD CALC: 38.6 % — SIGNIFICANT CHANGE UP (ref 34.5–45)
HGB BLD-MCNC: 11.8 G/DL — SIGNIFICANT CHANGE UP (ref 11.5–15.5)
HYPOCHROMIA BLD QL: SLIGHT — SIGNIFICANT CHANGE UP
INR BLD: 1.13 RATIO — SIGNIFICANT CHANGE UP (ref 0.88–1.16)
KETONES UR-MCNC: NEGATIVE — SIGNIFICANT CHANGE UP
LACTATE SERPL-SCNC: 2 MMOL/L — SIGNIFICANT CHANGE UP (ref 0.7–2)
LACTATE SERPL-SCNC: 2.3 MMOL/L — HIGH (ref 0.7–2)
LEUKOCYTE ESTERASE UR-ACNC: NEGATIVE — SIGNIFICANT CHANGE UP
LYMPHOCYTES # BLD AUTO: 3 % — LOW (ref 13–44)
MANUAL DIF COMMENT BLD-IMP: SIGNIFICANT CHANGE UP
MCHC RBC-ENTMCNC: 23.9 PG — LOW (ref 27–34)
MCHC RBC-ENTMCNC: 30.7 GM/DL — LOW (ref 32–36)
MCV RBC AUTO: 77.8 FL — LOW (ref 80–100)
MICROCYTES BLD QL: SLIGHT — SIGNIFICANT CHANGE UP
MONOCYTES NFR BLD AUTO: 2 % — SIGNIFICANT CHANGE UP (ref 2–14)
NEUTROPHILS NFR BLD AUTO: 85 % — HIGH (ref 43–77)
NEUTS BAND # BLD: 10 % — HIGH (ref 0–8)
NITRITE UR-MCNC: POSITIVE
PH UR: 5 — SIGNIFICANT CHANGE UP (ref 5–8)
PLAT MORPH BLD: NORMAL — SIGNIFICANT CHANGE UP
PLATELET # BLD AUTO: 198 K/UL — SIGNIFICANT CHANGE UP (ref 150–400)
POTASSIUM SERPL-MCNC: 3.6 MMOL/L — SIGNIFICANT CHANGE UP (ref 3.5–5.3)
POTASSIUM SERPL-SCNC: 3.6 MMOL/L — SIGNIFICANT CHANGE UP (ref 3.5–5.3)
PROT SERPL-MCNC: 7.5 G/DL — SIGNIFICANT CHANGE UP (ref 6–8.3)
PROT UR-MCNC: NEGATIVE — SIGNIFICANT CHANGE UP
PROTHROM AB SERPL-ACNC: 12.4 SEC — SIGNIFICANT CHANGE UP (ref 9.8–12.7)
RAPID RVP RESULT: SIGNIFICANT CHANGE UP
RBC # BLD: 4.96 M/UL — SIGNIFICANT CHANGE UP (ref 3.8–5.2)
RBC # FLD: 17.2 % — HIGH (ref 10.3–14.5)
RBC BLD AUTO: ABNORMAL
RBC CASTS # UR COMP ASSIST: SIGNIFICANT CHANGE UP /HPF (ref 0–2)
SODIUM SERPL-SCNC: 140 MMOL/L — SIGNIFICANT CHANGE UP (ref 135–145)
SP GR SPEC: 1.01 — SIGNIFICANT CHANGE UP (ref 1.01–1.02)
UROBILINOGEN FLD QL: NEGATIVE — SIGNIFICANT CHANGE UP
WBC # BLD: 19.4 K/UL — HIGH (ref 3.8–10.5)
WBC # FLD AUTO: 19.4 K/UL — HIGH (ref 3.8–10.5)
WBC UR QL: SIGNIFICANT CHANGE UP /HPF (ref 0–5)

## 2018-10-26 PROCEDURE — 71045 X-RAY EXAM CHEST 1 VIEW: CPT | Mod: 26

## 2018-10-26 PROCEDURE — 99285 EMERGENCY DEPT VISIT HI MDM: CPT

## 2018-10-26 RX ORDER — OXYCODONE AND ACETAMINOPHEN 5; 325 MG/1; MG/1
2 TABLET ORAL EVERY 8 HOURS
Refills: 0 | Status: DISCONTINUED | OUTPATIENT
Start: 2018-10-26 | End: 2018-10-26

## 2018-10-26 RX ORDER — HALOPERIDOL DECANOATE 100 MG/ML
5 INJECTION INTRAMUSCULAR ONCE
Refills: 0 | Status: COMPLETED | OUTPATIENT
Start: 2018-10-26 | End: 2018-10-26

## 2018-10-26 RX ORDER — AMPICILLIN SODIUM AND SULBACTAM SODIUM 250; 125 MG/ML; MG/ML
1.5 INJECTION, POWDER, FOR SUSPENSION INTRAMUSCULAR; INTRAVENOUS EVERY 6 HOURS
Refills: 0 | Status: DISCONTINUED | OUTPATIENT
Start: 2018-10-26 | End: 2018-10-30

## 2018-10-26 RX ORDER — SODIUM CHLORIDE 9 MG/ML
1000 INJECTION INTRAMUSCULAR; INTRAVENOUS; SUBCUTANEOUS
Refills: 0 | Status: DISCONTINUED | OUTPATIENT
Start: 2018-10-26 | End: 2018-10-26

## 2018-10-26 RX ORDER — INSULIN LISPRO 100/ML
VIAL (ML) SUBCUTANEOUS
Refills: 0 | Status: DISCONTINUED | OUTPATIENT
Start: 2018-10-26 | End: 2018-11-08

## 2018-10-26 RX ORDER — LACTULOSE 10 G/15ML
20 SOLUTION ORAL
Refills: 0 | Status: DISCONTINUED | OUTPATIENT
Start: 2018-10-26 | End: 2018-11-08

## 2018-10-26 RX ORDER — OLANZAPINE 15 MG/1
10 TABLET, FILM COATED ORAL DAILY
Refills: 0 | Status: DISCONTINUED | OUTPATIENT
Start: 2018-10-26 | End: 2018-11-08

## 2018-10-26 RX ORDER — BUDESONIDE AND FORMOTEROL FUMARATE DIHYDRATE 160; 4.5 UG/1; UG/1
2 AEROSOL RESPIRATORY (INHALATION)
Refills: 0 | Status: DISCONTINUED | OUTPATIENT
Start: 2018-10-26 | End: 2018-11-08

## 2018-10-26 RX ORDER — ASPIRIN/CALCIUM CARB/MAGNESIUM 324 MG
81 TABLET ORAL DAILY
Refills: 0 | Status: DISCONTINUED | OUTPATIENT
Start: 2018-10-26 | End: 2018-11-08

## 2018-10-26 RX ORDER — AMLODIPINE BESYLATE 2.5 MG/1
10 TABLET ORAL DAILY
Refills: 0 | Status: DISCONTINUED | OUTPATIENT
Start: 2018-10-26 | End: 2018-11-08

## 2018-10-26 RX ORDER — PIPERACILLIN AND TAZOBACTAM 4; .5 G/20ML; G/20ML
3.38 INJECTION, POWDER, LYOPHILIZED, FOR SOLUTION INTRAVENOUS ONCE
Refills: 0 | Status: COMPLETED | OUTPATIENT
Start: 2018-10-26 | End: 2018-10-26

## 2018-10-26 RX ORDER — SODIUM CHLORIDE 9 MG/ML
3300 INJECTION INTRAMUSCULAR; INTRAVENOUS; SUBCUTANEOUS ONCE
Refills: 0 | Status: COMPLETED | OUTPATIENT
Start: 2018-10-26 | End: 2018-10-26

## 2018-10-26 RX ORDER — IPRATROPIUM/ALBUTEROL SULFATE 18-103MCG
3 AEROSOL WITH ADAPTER (GRAM) INHALATION EVERY 6 HOURS
Refills: 0 | Status: DISCONTINUED | OUTPATIENT
Start: 2018-10-26 | End: 2018-11-05

## 2018-10-26 RX ORDER — LEVOTHYROXINE SODIUM 125 MCG
175 TABLET ORAL DAILY
Refills: 0 | Status: DISCONTINUED | OUTPATIENT
Start: 2018-10-26 | End: 2018-11-08

## 2018-10-26 RX ORDER — FUROSEMIDE 40 MG
40 TABLET ORAL DAILY
Refills: 0 | Status: DISCONTINUED | OUTPATIENT
Start: 2018-10-26 | End: 2018-11-08

## 2018-10-26 RX ADMIN — HALOPERIDOL DECANOATE 5 MILLIGRAM(S): 100 INJECTION INTRAMUSCULAR at 17:35

## 2018-10-26 RX ADMIN — PIPERACILLIN AND TAZOBACTAM 3.38 GRAM(S): 4; .5 INJECTION, POWDER, LYOPHILIZED, FOR SOLUTION INTRAVENOUS at 16:10

## 2018-10-26 RX ADMIN — SODIUM CHLORIDE 3300 MILLILITER(S): 9 INJECTION INTRAMUSCULAR; INTRAVENOUS; SUBCUTANEOUS at 16:10

## 2018-10-26 RX ADMIN — SODIUM CHLORIDE 3300 MILLILITER(S): 9 INJECTION INTRAMUSCULAR; INTRAVENOUS; SUBCUTANEOUS at 15:00

## 2018-10-26 RX ADMIN — PIPERACILLIN AND TAZOBACTAM 200 GRAM(S): 4; .5 INJECTION, POWDER, LYOPHILIZED, FOR SOLUTION INTRAVENOUS at 15:33

## 2018-10-26 NOTE — H&P ADULT - NSHPPHYSICALEXAM_GEN_ALL_CORE
Vital Signs Last 24 Hrs  T(C): 37.2 (26 Oct 2018 16:40), Max: 38.1 (26 Oct 2018 15:45)  T(F): 99 (26 Oct 2018 16:40), Max: 100.5 (26 Oct 2018 15:45)  HR: 100 (26 Oct 2018 16:40) (98 - 126)  BP: 112/50 (26 Oct 2018 16:40) (90/56 - 112/50)  RR: 18 (26 Oct 2018 16:40) (15 - 18)  SpO2: 96% (26 Oct 2018 16:40) (90% - 98%)    ________________________________________________  PHYSICAL EXAM:  GENERAL: NAD  HEENT: Normocephalic;  conjunctivae and sclerae clear; moist mucous membranes;   NECK : supple  CHEST/LUNG: Clear to auscultation bilaterally with good air entry   HEART: S1 S2  regular; no murmurs, gallops or rubs  ABDOMEN: Soft, Nontender, Nondistended; Bowel sounds present  EXTREMITIES: no cyanosis; no edema; no calf tenderness  NERVOUS SYSTEM:  Awake and alert; Oriented  to place, person and time Vital Signs Last 24 Hrs  T(C): 37.2 (26 Oct 2018 16:40), Max: 38.1 (26 Oct 2018 15:45)  T(F): 99 (26 Oct 2018 16:40), Max: 100.5 (26 Oct 2018 15:45)  HR: 100 (26 Oct 2018 16:40) (98 - 126)  BP: 112/50 (26 Oct 2018 16:40) (90/56 - 112/50)  RR: 18 (26 Oct 2018 16:40) (15 - 18)  SpO2: 96% (26 Oct 2018 16:40) (90% - 98%)    ________________________________________________  PHYSICAL EXAM:  GENERAL: NAD  HEENT: Normocephalic;  conjunctivae and sclerae clear; moist mucous membranes;   NECK : supple  CHEST/LUNG: Clear to auscultation bilaterally with good air entry   HEART: S1 S2  regular; no murmurs, gallops or rubs  ABDOMEN: Soft, Nontender, Nondistended; Bowel sounds present  EXTREMITIES: no cyanosis; bilateral 1+ pitting edema, erythematous, warm bilateral LE, no tenderness on palpation, several blisters flattened, no fluid, no pus, no breakage of skin noted  NERVOUS SYSTEM:  Awake and alert; Oriented  to place, person and time

## 2018-10-26 NOTE — H&P ADULT - PMH
Arthritis    Bipolar 1 disorder    Chronic diastolic congestive heart failure    Chronic obstructive pulmonary disease, unspecified COPD type    HTN (hypertension)    Hypercholesteremia    Hypothyroidism    Schizo affective schizophrenia Arthritis    Bipolar 1 disorder    Chronic diastolic congestive heart failure    Chronic obstructive pulmonary disease, unspecified COPD type    HTN (hypertension)    Hypercholesteremia    Hypothyroidism    Interstitial lung disease    Schizo affective schizophrenia

## 2018-10-26 NOTE — H&P ADULT - PROBLEM SELECTOR PLAN 10
IMPROVE VTE Individual Risk Assessment          RISK                                                          Points  [  ] Previous VTE                                                3  [  ] Thrombophilia                                             2  [  ] Lower limb paralysis                                   2        (unable to hold up >15 seconds)    [  ] Current Cancer                                             2         (within 6 months)  [ x ] Immobilization > 24 hrs                              1  [  ] ICU/CCU stay > 24 hours                             1  [ x ] Age > 60                                                         1    IMPROVE VTE Score: 2    c/w heparin for vte prophylaxis

## 2018-10-26 NOTE — ED PROVIDER NOTE - MEDICAL DECISION MAKING DETAILS
Pt with shortness of breath and fever x today. Pt febrile and tachycardic in ED. Code sepsis called. Will get sepsis workup and reassess.

## 2018-10-26 NOTE — CHART NOTE - NSCHARTNOTEFT_GEN_A_CORE
Patient is a 73 year old female with PMHx of COPD, hypothyroid, HTN and HLD. Patient presents to the ED from [facility] nursing home c/o shortness of breath x today; patient also noted to have a fever (103). ED SWer requested as patient is requesting to leave AMA and "would rather sleep on the street" when explained that [facility] nursing will not accept her back upon medical clearance from [facility] ED.  ED SWer met with patient in ED at bedside; role of SW was explained.  Patient did not present AA&Ox3- stated that she is at the hospital- however, did not know why or which hospital.  Patient stated that she lives in [place] and has been at [facility] for a few weeks.  Patient stated that she went to [facility] because she owns it and not because she was transferred there for LICO or LTC.  Patient repeated several times that she is the owner or [facility].  Patient stated that she has 6 children: Prince Rodgers, Fermín, Jamie, Jonna and Fiona.  Patient stated that she did not know any of the people listed as her emergency contact, including son Gómez.  ED SWer relayed information to ED MD that patient is not AA&Ox3 and cannot d/c AMA unless family accepts responsibility in taking her home from [facility] ED; ED MD in agreement and attempting to reach patient's son.  ED SWer unable to reach patient's son after 5-6 phone call attempts () - no option to leave voice-message.

## 2018-10-26 NOTE — ED PROVIDER NOTE - OBJECTIVE STATEMENT
74 y/o female with PMHx of COPD, hypothyroid, HTN, HLD presents to the ED from nursing home c/o shortness of breath x today. Pt notes associated fever (103). Pt denies pain, N/V/D, diaphoresis, or any other complaints. Pt allergic to moxifloxacin and Avelox (unknown).

## 2018-10-26 NOTE — CHART NOTE - NSCHARTNOTEFT_GEN_A_CORE
Patient refusing all IV meds including abx, could not be redirected. Pt stated she will take PO meds. Ordered PO Augmentin 875 mg to cover tonight. Primary team to follow tomorrow.

## 2018-10-26 NOTE — H&P ADULT - NSHPLABSRESULTS_GEN_ALL_CORE
LABS:      CBC Full  -  ( 26 Oct 2018 15:06 )  WBC Count : 19.4 K/uL  Hemoglobin : 11.8 g/dL  Hematocrit : 38.6 %  Platelet Count - Automated : 198 K/uL  Mean Cell Volume : 77.8 fl  Mean Cell Hemoglobin : 23.9 pg  Mean Cell Hemoglobin Concentration : 30.7 gm/dL  Auto Neutrophil # : x  Auto Lymphocyte # : x  Auto Monocyte # : x  Auto Eosinophil # : x  Auto Basophil # : x  Auto Neutrophil % : 85.0 %  Auto Lymphocyte % : 3.0 %  Auto Monocyte % : 2.0 %  Auto Eosinophil % : x  Auto Basophil % : x    10-26    140  |  103  |  19<H>  ----------------------------<  156<H>  3.6   |  30  |  1.16    Ca    9.4      26 Oct 2018 15:06    TPro  7.5  /  Alb  3.2<L>  /  TBili  0.6  /  DBili  x   /  AST  21  /  ALT  20  /  AlkPhos  77  10-26    PT/INR - ( 26 Oct 2018 15:06 )   PT: 12.4 sec;   INR: 1.13 ratio         PTT - ( 26 Oct 2018 15:06 )  PTT:41.8 sec      Urinalysis Basic - ( 26 Oct 2018 15:33 )    Color: Yellow / Appearance: Clear / S.010 / pH: x  Gluc: x / Ketone: Negative  / Bili: Negative / Urobili: Negative   Blood: x / Protein: Negative / Nitrite: Positive   Leuk Esterase: Negative / RBC: 0-2 /HPF / WBC 0-2 /HPF   Sq Epi: x / Non Sq Epi: Negative /HPF / Bacteria: > 10,000 /HPF                RADIOLOGY & ADDITIONAL STUDIES (The following images were personally reviewed):      EXAM:  XR CHEST PORTABLE IMMED 1V                            PROCEDURE DATE:  10/26/2018          INTERPRETATION:  CLINICAL STATEMENT: Chest Pain.    TECHNIQUE: AP view of the chest.    COMPARISON: 2017    FINDINGS/  IMPRESSION:  Increased interstitial lung markings without significant change. No new   consolidation or significant pleural effusion.    Heart size cannot be accurately assessed in this projection.    Chronic deformity both shoulders.

## 2018-10-26 NOTE — H&P ADULT - ASSESSMENT
Patient admitted for sepsis, unknown source, however likely due to bilateral LE cellulitis 2/2 chronic venous insufficiency/stasis

## 2018-10-26 NOTE — H&P ADULT - HISTORY OF PRESENT ILLNESS
full H&P to follow Patient is 74yo F with PMH of HTN, diastolic CHf, HLD, Type 2 DM, COPD/emphysema, cor pulmonale, hypothyroidism, venous insufficiency, bipolar, schizoaffective disorder, GERD, allergic rhinitis, dry eyes, brought in by NH due to  fever of 104F axillary at NH, given cold compresses and Motrin, with continued chills. At NH, patient also noted to be tachycardic to 167bpm with altered mental status and confusion, O2 sat of 87% on room air, with nebulizer treatment and O2 via nasal cannula given. In the ED. patient was CODE SEPSIS due to high fever of 103, tachycardia, and elevated lactate. When examined by me, patient denies all complaints, however able to recall the story as to why she is here. Patient otherwise denies cough, nasal congestion, chest pain, shortness of breath, burning with urination, all other ROS negative.

## 2018-10-26 NOTE — H&P ADULT - PROBLEM SELECTOR PLAN 1
lactate improved s/p bolus  c/w unasyn for likely bilateral LE venous stasis cellulitis  f/u blood cultures

## 2018-10-27 LAB
24R-OH-CALCIDIOL SERPL-MCNC: 41.3 NG/ML — SIGNIFICANT CHANGE UP (ref 30–80)
CHOLEST SERPL-MCNC: 113 MG/DL — SIGNIFICANT CHANGE UP (ref 10–199)
GLUCOSE BLDC GLUCOMTR-MCNC: 118 MG/DL — HIGH (ref 70–99)
GLUCOSE BLDC GLUCOMTR-MCNC: 154 MG/DL — HIGH (ref 70–99)
GLUCOSE BLDC GLUCOMTR-MCNC: 159 MG/DL — HIGH (ref 70–99)
GRAM STN FLD: SIGNIFICANT CHANGE UP
GRAM STN FLD: SIGNIFICANT CHANGE UP
HBA1C BLD-MCNC: 6.5 % — HIGH (ref 4–5.6)
HCT VFR BLD CALC: 35.4 % — SIGNIFICANT CHANGE UP (ref 34.5–45)
HDLC SERPL-MCNC: 54 MG/DL — SIGNIFICANT CHANGE UP
HGB BLD-MCNC: 11.1 G/DL — LOW (ref 11.5–15.5)
LIPID PNL WITH DIRECT LDL SERPL: 44 MG/DL — SIGNIFICANT CHANGE UP
MCHC RBC-ENTMCNC: 24.1 PG — LOW (ref 27–34)
MCHC RBC-ENTMCNC: 31.2 GM/DL — LOW (ref 32–36)
MCV RBC AUTO: 77.2 FL — LOW (ref 80–100)
METHOD TYPE: SIGNIFICANT CHANGE UP
P AERUGINOSA DNA BLD POS NAA+NON-PROBE: SIGNIFICANT CHANGE UP
PLATELET # BLD AUTO: 198 K/UL — SIGNIFICANT CHANGE UP (ref 150–400)
RBC # BLD: 4.59 M/UL — SIGNIFICANT CHANGE UP (ref 3.8–5.2)
RBC # FLD: 17.2 % — HIGH (ref 10.3–14.5)
SPECIMEN SOURCE: SIGNIFICANT CHANGE UP
SPECIMEN SOURCE: SIGNIFICANT CHANGE UP
TOTAL CHOLESTEROL/HDL RATIO MEASUREMENT: 2.1 RATIO — LOW (ref 3.3–7.1)
TRIGL SERPL-MCNC: 77 MG/DL — SIGNIFICANT CHANGE UP (ref 10–149)
TSH SERPL-MCNC: 0.82 UU/ML — SIGNIFICANT CHANGE UP (ref 0.34–4.82)
VIT B12 SERPL-MCNC: 720 PG/ML — SIGNIFICANT CHANGE UP (ref 232–1245)
WBC # BLD: 18.3 K/UL — HIGH (ref 3.8–10.5)
WBC # FLD AUTO: 18.3 K/UL — HIGH (ref 3.8–10.5)

## 2018-10-27 RX ORDER — ACETAMINOPHEN 500 MG
650 TABLET ORAL EVERY 6 HOURS
Refills: 0 | Status: DISCONTINUED | OUTPATIENT
Start: 2018-10-27 | End: 2018-11-08

## 2018-10-27 RX ORDER — IPRATROPIUM/ALBUTEROL SULFATE 18-103MCG
3 AEROSOL WITH ADAPTER (GRAM) INHALATION ONCE
Refills: 0 | Status: COMPLETED | OUTPATIENT
Start: 2018-10-27 | End: 2018-10-27

## 2018-10-27 RX ADMIN — Medication 3 MILLILITER(S): at 20:28

## 2018-10-27 RX ADMIN — Medication 1 TABLET(S): at 00:41

## 2018-10-27 RX ADMIN — AMLODIPINE BESYLATE 10 MILLIGRAM(S): 2.5 TABLET ORAL at 05:28

## 2018-10-27 RX ADMIN — Medication 3 MILLILITER(S): at 09:08

## 2018-10-27 RX ADMIN — Medication 3 MILLILITER(S): at 03:38

## 2018-10-27 RX ADMIN — Medication 650 MILLIGRAM(S): at 05:28

## 2018-10-27 RX ADMIN — Medication 81 MILLIGRAM(S): at 12:39

## 2018-10-27 RX ADMIN — Medication 650 MILLIGRAM(S): at 06:17

## 2018-10-27 RX ADMIN — Medication 3 MILLILITER(S): at 20:43

## 2018-10-27 RX ADMIN — Medication 3 MILLILITER(S): at 15:51

## 2018-10-27 RX ADMIN — BUDESONIDE AND FORMOTEROL FUMARATE DIHYDRATE 2 PUFF(S): 160; 4.5 AEROSOL RESPIRATORY (INHALATION) at 10:41

## 2018-10-27 RX ADMIN — Medication 1: at 12:38

## 2018-10-27 RX ADMIN — Medication 175 MICROGRAM(S): at 05:30

## 2018-10-27 RX ADMIN — Medication 40 MILLIGRAM(S): at 05:29

## 2018-10-27 RX ADMIN — Medication 1 TABLET(S): at 12:39

## 2018-10-27 RX ADMIN — OLANZAPINE 10 MILLIGRAM(S): 15 TABLET, FILM COATED ORAL at 12:40

## 2018-10-27 NOTE — PROGRESS NOTE ADULT - SUBJECTIVE AND OBJECTIVE BOX
Patient is a 73y old  Female who presents with a chief complaint of SEPSIS    PATIENT IS SEEN AND EXAMINED IN MEDICAL FLOOR.    ALLERGIES:  Avelox (Unknown)  moxifloxacin (Unknown)    VITALS:    Vital Signs Last 24 Hrs  T(C): 37.5 (27 Oct 2018 15:27), Max: 39.2 (27 Oct 2018 05:13)  T(F): 99.5 (27 Oct 2018 15:27), Max: 102.5 (27 Oct 2018 05:13)  HR: 112 (27 Oct 2018 14:39) (100 - 115)  BP: 126/63 (27 Oct 2018 14:39) (114/80 - 142/75)  BP(mean): --  RR: 16 (27 Oct 2018 14:39) (15 - 16)  SpO2: 92% (27 Oct 2018 14:39) (92% - 95%)    LABS:  CBC Full  -  ( 27 Oct 2018 06:54 )  WBC Count : 18.3 K/uL  Hemoglobin : 11.1 g/dL  Hematocrit : 35.4 %  Platelet Count - Automated : 198 K/uL  Mean Cell Volume : 77.2 fl  Mean Cell Hemoglobin : 24.1 pg  Mean Cell Hemoglobin Concentration : 31.2 gm/dL  Auto Neutrophil # : x  Auto Lymphocyte # : x  Auto Monocyte # : x  Auto Eosinophil # : x  Auto Basophil # : x  Auto Neutrophil % : x  Auto Lymphocyte % : x  Auto Monocyte % : x  Auto Eosinophil % : x  Auto Basophil % : x    PT/INR - ( 26 Oct 2018 15:06 )   PT: 12.4 sec;   INR: 1.13 ratio         PTT - ( 26 Oct 2018 15:06 )  PTT:41.8 sec  10-26    140  |  103  |  19<H>  ----------------------------<  156<H>  3.6   |  30  |  1.16    Ca    9.4      26 Oct 2018 15:06    TPro  7.5  /  Alb  3.2<L>  /  TBili  0.6  /  DBili  x   /  AST  21  /  ALT  20  /  AlkPhos  77  10-26    CAPILLARY BLOOD GLUCOSE      POCT Blood Glucose.: 154 mg/dL (27 Oct 2018 17:07)  POCT Blood Glucose.: 159 mg/dL (27 Oct 2018 12:06)  POCT Blood Glucose.: 118 mg/dL (27 Oct 2018 08:18)        LIVER FUNCTIONS - ( 26 Oct 2018 15:06 )  Alb: 3.2 g/dL / Pro: 7.5 g/dL / ALK PHOS: 77 U/L / ALT: 20 U/L DA / AST: 21 U/L / GGT: x                 .Blood Blood-Peripheral  10-27 @ 00:14   Growth in aerobic bottle: Gram Negative Rods  "Due to technical problems, Proteus sp. will Not be reported as part of  the BCID panel until further notice"  ***Blood Panel PCR results on this specimen are available  approximately 3 hours after the Gram stain result.***  Gram stain, PCR, and/or culture results may not always  correspond due to difference in methodologies.  ************************************************************  This PCR assay was performed using [employer].  The following targets are tested for: Enterococcus,  vancomycin resistant enterococci, Listeria monocytogenes,  coagulase negative staphylococci, S. aureus,  methicillin resistant S. aureus, Streptococcus agalactiae  (Group B), S. pneumoniae, S. pyogenes (Group A),  Acinetobacter baumannii, Enterobacter cloacae, E. coli,  Klebsiella oxytoca, K. pneumoniae, Proteus sp.,  Serratia marcescens, Haemophilus influenzae,  Neisseria meningitidis, Pseudomonas aeruginosa, Candida  albicans, C. glabrata, C krusei, C parapsilosis,  C. tropicalis and the KPC resistance gene.  --    Growth in aerobic bottle: Gram Negative Rods      .Urine Clean Catch (Midstream)  10-26 @ 23:47   >100,000 CFU/ml Klebsiella pneumoniae  --  --          MEDICATIONS:    MEDICATIONS  (STANDING):  ALBUTerol/ipratropium for Nebulization 3 milliLiter(s) Nebulizer every 6 hours  ALBUTerol/ipratropium for Nebulization. 3 milliLiter(s) Nebulizer once  amLODIPine   Tablet 10 milliGRAM(s) Oral daily  ampicillin/sulbactam  IVPB 1.5 Gram(s) IV Intermittent every 6 hours  artificial tears (preservative free) Ophthalmic Solution 1 Drop(s) Both EYES two times a day  aspirin enteric coated 81 milliGRAM(s) Oral daily  buDESOnide  80 MICROgram(s)/formoterol 4.5 MICROgram(s) Inhaler 2 Puff(s) Inhalation two times a day  furosemide    Tablet 40 milliGRAM(s) Oral daily  insulin lispro (HumaLOG) corrective regimen sliding scale   SubCutaneous three times a day before meals  lactulose Syrup 20 Gram(s) Oral two times a day  levothyroxine 175 MICROGram(s) Oral daily  multivitamin 1 Tablet(s) Oral daily  OLANZapine 10 milliGRAM(s) Oral daily      MEDICATIONS  (PRN):  acetaminophen   Tablet .. 650 milliGRAM(s) Oral every 6 hours PRN Temp greater or equal to 38C (100.4F)  oxyCODONE    5 mG/acetaminophen 325 mG 2 Tablet(s) Oral every 8 hours PRN Severe Pain (7 - 10)      REVIEW OF SYSTEMS:                           ALL ROS DONE [ X   ]    CONSTITUTIONAL:  LETHARGIC [   ], FEVER [   ], UNRESPONSIVE [   ]  CVS:  CP  [   ], SOB, [   ], PALPITATIONS [   ], DIZZYNESS [   ]  RS: COUGH [   ], SPUTUM [   ]  GI: ABDOMINAL PAIN [   ], NAUSEA [   ], VOMITINGS [   ], DIARRHEA [   ], CONSTIPATION [   ]  :  DYSURIA [   ], NOCTURIA [   ], INCREASED FREQUENCY [   ], DRIBLING [   ],  SKELETAL: PAINFUL JOINTS [   ], SWOLLEN JOINTS [   ], NECK ACHE [   ], LOW BACK ACHE [   ],  SKIN : ULCERS [   ], RASH [   ], ITCHING [   ]  CNS: HEAD ACHE [   ], DOUBLE VISION [   ], BLURRED VISION [   ], AMS / CONFUSION [   ], SEIZURES [   ], WEAKNESS [   ],TINGLING / NUMBNESS [   ]    PHYSICAL EXAMINATION:  GENERAL APPEARANCE: NO DISTRESS  HEENT:  NO PALLOR, NO  JVD,  NO   NODES, NECK SUPPLE  CVS: S1 +, S2 +,   RS: AEEB,  OCCASIONAL  RALES +,   NO RONCHI  ABD: SOFT, NT, NO, BS +  EXT: NO PE  SKIN: WARM,   SKELETAL:  ROM ACCEPTABLE  CNS:  AAO X    ,   DEFICITS    RADIOLOGY :      ASSESSMENT :     Sepsis  Interstitial lung disease  Chronic diastolic congestive heart failure  Chronic obstructive pulmonary disease, unspecified COPD type  Hypercholesteremia  Arthritis  Hypothyroidism  Bipolar 1 disorder  Schizo affective schizophrenia  HTN (hypertension)      PLAN:  HPI:  Patient is 72yo F with PMH of HTN, diastolic CHf, HLD, Type 2 DM, COPD/emphysema, cor pulmonale, hypothyroidism, venous insufficiency, bipolar, schizoaffective disorder, GERD, allergic rhinitis, dry eyes, brought in by NH due to  fever of 104F axillary at NH, given cold compresses and Motrin, with continued chills. At NH, patient also noted to be tachycardic to 167bpm with altered mental status and confusion, O2 sat of 87% on room air, with nebulizer treatment and O2 via nasal cannula given. In the ED. patient was CODE SEPSIS due to high fever of 103, tachycardia, and elevated lactate. When examined by me, patient denies all complaints, however able to recall the story as to why she is here. Patient otherwise denies cough, nasal congestion, chest pain, shortness of breath, burning with urination, all other ROS negative. (26 Oct 2018 19:48)    - SEPSIS, GRAM NEGATIVE BACTEREMIA, UTI ( KLEBSIELLA ), CELLULITIS OF B/L LOWER EXXTREMITIES ON IV UNASYN. ID F/UP DR. NELSON, AND F/UP FINAL CXS AND ANTIBIOTIC SENSITIVITY  - RESOLVED AMS DUE TO METABOLIC ENCEPHALOPATHY  - PROGRESSIVE DEMENTIA  - B/L LE VENOUS STASIS DERMATITIS  - COPD , EMPHYSEMA AND CHRONIC HYPOXIA  - GI AND DVT PROPHYLAXIS  - DR. PARKS

## 2018-10-27 NOTE — PROVIDER CONTACT NOTE (CRITICAL VALUE NOTIFICATION) - SITUATION
Critical value obtained  from [facility] blood culture collected on 10/26 result as : growth in aerobic bottle grand negative franck.

## 2018-10-27 NOTE — CHART NOTE - NSCHARTNOTEFT_GEN_A_CORE
Was paged by the RN that pt is refusing the IV antibiotics since morning and only wants oral medication. I spoke with the patient and tried ot convince her but she is adamant. I told her that you have infection in the blood and urine so you need IV antibiotics but She says 'I have  the right to refuse treatment'. I called the Son Gómez at [phone] and explained him everything and asked him to come to the hospital and convince her but he is not able to come to the hospital . I transferred his call to his mother but the pt hung up on him. I called the friend whose number is on the file Desira[place]atherine but she is also unable to help. I told the pt she can die and that Dr Leavitt will speak with her in the morning but she refused the Iv medication again  Rolando Leavitt made aware of the situation

## 2018-10-28 LAB
-  AMIKACIN: SIGNIFICANT CHANGE UP
-  AMOXICILLIN/CLAVULANIC ACID: SIGNIFICANT CHANGE UP
-  AMPICILLIN/SULBACTAM: SIGNIFICANT CHANGE UP
-  AMPICILLIN: SIGNIFICANT CHANGE UP
-  AZTREONAM: SIGNIFICANT CHANGE UP
-  CEFAZOLIN: SIGNIFICANT CHANGE UP
-  CEFEPIME: SIGNIFICANT CHANGE UP
-  CEFOXITIN: SIGNIFICANT CHANGE UP
-  CEFTRIAXONE: SIGNIFICANT CHANGE UP
-  CIPROFLOXACIN: SIGNIFICANT CHANGE UP
-  ERTAPENEM: SIGNIFICANT CHANGE UP
-  GENTAMICIN: SIGNIFICANT CHANGE UP
-  IMIPENEM: SIGNIFICANT CHANGE UP
-  LEVOFLOXACIN: SIGNIFICANT CHANGE UP
-  MEROPENEM: SIGNIFICANT CHANGE UP
-  NITROFURANTOIN: SIGNIFICANT CHANGE UP
-  PIPERACILLIN/TAZOBACTAM: SIGNIFICANT CHANGE UP
-  TIGECYCLINE: SIGNIFICANT CHANGE UP
-  TOBRAMYCIN: SIGNIFICANT CHANGE UP
-  TRIMETHOPRIM/SULFAMETHOXAZOLE: SIGNIFICANT CHANGE UP
CULTURE RESULTS: SIGNIFICANT CHANGE UP
GLUCOSE BLDC GLUCOMTR-MCNC: 152 MG/DL — HIGH (ref 70–99)
GLUCOSE BLDC GLUCOMTR-MCNC: 164 MG/DL — HIGH (ref 70–99)
GLUCOSE BLDC GLUCOMTR-MCNC: 182 MG/DL — HIGH (ref 70–99)
GLUCOSE BLDC GLUCOMTR-MCNC: 185 MG/DL — HIGH (ref 70–99)
GLUCOSE BLDC GLUCOMTR-MCNC: 195 MG/DL — HIGH (ref 70–99)
HCT VFR BLD CALC: 35.6 % — SIGNIFICANT CHANGE UP (ref 34.5–45)
HGB BLD-MCNC: 11.1 G/DL — LOW (ref 11.5–15.5)
MCHC RBC-ENTMCNC: 24.3 PG — LOW (ref 27–34)
MCHC RBC-ENTMCNC: 31.3 GM/DL — LOW (ref 32–36)
MCV RBC AUTO: 77.6 FL — LOW (ref 80–100)
METHOD TYPE: SIGNIFICANT CHANGE UP
ORGANISM # SPEC MICROSCOPIC CNT: SIGNIFICANT CHANGE UP
ORGANISM # SPEC MICROSCOPIC CNT: SIGNIFICANT CHANGE UP
PLATELET # BLD AUTO: 192 K/UL — SIGNIFICANT CHANGE UP (ref 150–400)
RBC # BLD: 4.59 M/UL — SIGNIFICANT CHANGE UP (ref 3.8–5.2)
RBC # FLD: 17.4 % — HIGH (ref 10.3–14.5)
SPECIMEN SOURCE: SIGNIFICANT CHANGE UP
WBC # BLD: 12.5 K/UL — HIGH (ref 3.8–10.5)
WBC # FLD AUTO: 12.5 K/UL — HIGH (ref 3.8–10.5)

## 2018-10-28 RX ADMIN — Medication 1 TABLET(S): at 12:37

## 2018-10-28 RX ADMIN — Medication 3 MILLILITER(S): at 20:42

## 2018-10-28 RX ADMIN — Medication 1: at 12:29

## 2018-10-28 RX ADMIN — Medication 3 MILLILITER(S): at 15:38

## 2018-10-28 RX ADMIN — Medication 1 TABLET(S): at 05:23

## 2018-10-28 RX ADMIN — BUDESONIDE AND FORMOTEROL FUMARATE DIHYDRATE 2 PUFF(S): 160; 4.5 AEROSOL RESPIRATORY (INHALATION) at 03:05

## 2018-10-28 RX ADMIN — Medication 81 MILLIGRAM(S): at 12:37

## 2018-10-28 RX ADMIN — Medication 1: at 09:04

## 2018-10-28 RX ADMIN — OLANZAPINE 10 MILLIGRAM(S): 15 TABLET, FILM COATED ORAL at 12:37

## 2018-10-28 RX ADMIN — BUDESONIDE AND FORMOTEROL FUMARATE DIHYDRATE 2 PUFF(S): 160; 4.5 AEROSOL RESPIRATORY (INHALATION) at 21:54

## 2018-10-28 RX ADMIN — Medication 1 DROP(S): at 05:25

## 2018-10-28 RX ADMIN — Medication 3 MILLILITER(S): at 08:59

## 2018-10-28 RX ADMIN — Medication 1 DROP(S): at 17:39

## 2018-10-28 RX ADMIN — AMLODIPINE BESYLATE 10 MILLIGRAM(S): 2.5 TABLET ORAL at 05:24

## 2018-10-28 RX ADMIN — Medication 175 MICROGRAM(S): at 05:24

## 2018-10-28 RX ADMIN — BUDESONIDE AND FORMOTEROL FUMARATE DIHYDRATE 2 PUFF(S): 160; 4.5 AEROSOL RESPIRATORY (INHALATION) at 09:05

## 2018-10-28 RX ADMIN — Medication 3 MILLILITER(S): at 02:44

## 2018-10-28 RX ADMIN — Medication 40 MILLIGRAM(S): at 05:24

## 2018-10-28 NOTE — PROGRESS NOTE ADULT - SUBJECTIVE AND OBJECTIVE BOX
Patient is a 73y old  Female who presents with a chief complaint of SEPSIS    PATIENT IS SEEN AND EXAMINED IN MEDICAL FLOOR.    ALLERGIES:  Avelox (Unknown)  moxifloxacin (Unknown)      VITALS:    Vital Signs Last 24 Hrs  T(C): 36.7 (28 Oct 2018 05:15), Max: 38 (27 Oct 2018 14:39)  T(F): 98.1 (28 Oct 2018 05:15), Max: 100.4 (27 Oct 2018 14:39)  HR: 95 (28 Oct 2018 05:15) (95 - 112)  BP: 133/77 (28 Oct 2018 05:15) (126/63 - 143/66)  BP(mean): --  RR: 18 (28 Oct 2018 05:15) (16 - 18)  SpO2: 96% (28 Oct 2018 05:15) (92% - 96%)    LABS:  CBC Full  -  ( 28 Oct 2018 06:30 )  WBC Count : 12.5 K/uL  Hemoglobin : 11.1 g/dL  Hematocrit : 35.6 %  Platelet Count - Automated : 192 K/uL  Mean Cell Volume : 77.6 fl  Mean Cell Hemoglobin : 24.3 pg  Mean Cell Hemoglobin Concentration : 31.3 gm/dL  Auto Neutrophil # : x  Auto Lymphocyte # : x  Auto Monocyte # : x  Auto Eosinophil # : x  Auto Basophil # : x  Auto Neutrophil % : x  Auto Lymphocyte % : x  Auto Monocyte % : x  Auto Eosinophil % : x  Auto Basophil % : x    PT/INR - ( 26 Oct 2018 15:06 )   PT: 12.4 sec;   INR: 1.13 ratio         PTT - ( 26 Oct 2018 15:06 )  PTT:41.8 sec  10-26    140  |  103  |  19<H>  ----------------------------<  156<H>  3.6   |  30  |  1.16    Ca    9.4      26 Oct 2018 15:06    TPro  7.5  /  Alb  3.2<L>  /  TBili  0.6  /  DBili  x   /  AST  21  /  ALT  20  /  AlkPhos  77  10-26    CAPILLARY BLOOD GLUCOSE    POCT Blood Glucose.: 195 mg/dL (28 Oct 2018 11:10)  POCT Blood Glucose.: 164 mg/dL (28 Oct 2018 08:16)  POCT Blood Glucose.: 154 mg/dL (27 Oct 2018 17:07)      LIVER FUNCTIONS - ( 26 Oct 2018 15:06 )  Alb: 3.2 g/dL / Pro: 7.5 g/dL / ALK PHOS: 77 U/L / ALT: 20 U/L DA / AST: 21 U/L / GGT: x             .Blood Blood-Peripheral  10-27 @ 00:14   Growth in aerobic bottle: Gram Negative Rods  "Due to technical problems, Proteus sp. will Not be reported as part of  the BCID panel until further notice"  ***Blood Panel PCR results on this specimen are available  approximately 3 hours after the Gram stain result.***  Gram stain, PCR, and/or culture results may not always  correspond due to difference in methodologies.  ************************************************************  This PCR assay was performed using [employer].  The following targets are tested for: Enterococcus,  vancomycin resistant enterococci, Listeria monocytogenes,  coagulase negative staphylococci, S. aureus,  methicillin resistant S. aureus, Streptococcus agalactiae  (Group B), S. pneumoniae, S. pyogenes (Group A),  Acinetobacter baumannii, Enterobacter cloacae, E. coli,  Klebsiella oxytoca, K. pneumoniae, Proteus sp.,  Serratia marcescens, Haemophilus influenzae,  Neisseria meningitidis, Pseudomonas aeruginosa, Candida  albicans, C. glabrata, C krusei, C parapsilosis,  C. tropicalis and the KPC resistance gene.  --  Blood Culture PCR      .Urine Clean Catch (Midstream)  10-26 @ 23:47   >100,000 CFU/ml Klebsiella pneumoniae  --  --    MEDICATIONS:    MEDICATIONS  (STANDING):  ALBUTerol/ipratropium for Nebulization 3 milliLiter(s) Nebulizer every 6 hours  amLODIPine   Tablet 10 milliGRAM(s) Oral daily  ampicillin/sulbactam  IVPB 1.5 Gram(s) IV Intermittent every 6 hours  artificial tears (preservative free) Ophthalmic Solution 1 Drop(s) Both EYES two times a day  aspirin enteric coated 81 milliGRAM(s) Oral daily  buDESOnide  80 MICROgram(s)/formoterol 4.5 MICROgram(s) Inhaler 2 Puff(s) Inhalation two times a day  furosemide    Tablet 40 milliGRAM(s) Oral daily  insulin lispro (HumaLOG) corrective regimen sliding scale   SubCutaneous three times a day before meals  lactulose Syrup 20 Gram(s) Oral two times a day  levothyroxine 175 MICROGram(s) Oral daily  multivitamin 1 Tablet(s) Oral daily  OLANZapine 10 milliGRAM(s) Oral daily      MEDICATIONS  (PRN):  acetaminophen   Tablet .. 650 milliGRAM(s) Oral every 6 hours PRN Temp greater or equal to 38C (100.4F)  oxyCODONE    5 mG/acetaminophen 325 mG 2 Tablet(s) Oral every 8 hours PRN Severe Pain (7 - 10)      REVIEW OF SYSTEMS:                           ALL ROS DONE [ X   ]    CONSTITUTIONAL:  LETHARGIC [   ], FEVER [   ], UNRESPONSIVE [   ]  CVS:  CP  [   ], SOB, [   ], PALPITATIONS [   ], DIZZYNESS [   ]  RS: COUGH [   ], SPUTUM [   ]  GI: ABDOMINAL PAIN [   ], NAUSEA [   ], VOMITINGS [   ], DIARRHEA [   ], CONSTIPATION [   ]  :  DYSURIA [   ], NOCTURIA [   ], INCREASED FREQUENCY [   ], DRIBLING [   ],  SKELETAL: PAINFUL JOINTS [   ], SWOLLEN JOINTS [   ], NECK ACHE [   ], LOW BACK ACHE [   ],  SKIN : ULCERS [   ], RASH [   ], ITCHING [   ]  CNS: HEAD ACHE [   ], DOUBLE VISION [   ], BLURRED VISION [   ], AMS / CONFUSION [   ], SEIZURES [   ], WEAKNESS [   ],TINGLING / NUMBNESS [   ]      PHYSICAL EXAMINATION:  GENERAL APPEARANCE: NO DISTRESS  HEENT:  NO PALLOR, NO  JVD,  NO   NODES, NECK SUPPLE  CVS: S1 +, S2 +,   RS: AEEB,  OCCASIONAL  RALES +,   MILD B/L RONCHI +  ABD: SOFT, NT, NO, BS +  EXT: PE +  SKIN: WARM, B/L LE ERYTHEMA, STASIS DERMATITIS +  SKELETAL:  ROM ACCEPTABLE  CNS:  AAO X 1-2   , NO  DEFICITS    RADIOLOGY :    < from: Xray Chest 1 View-PORTABLE IMMEDIATE (10.26.18 @ 15:55) >  IMPRESSION:  Increased interstitial lung markings without significant change. No new   consolidation or significant pleural effusion.    Heart size cannot be accurately assessed in this projection.    Chronic deformity both shoulders.    < end of copied text >        ASSESSMENT :     Sepsis  Interstitial lung disease  Chronic diastolic congestive heart failure  Chronic obstructive pulmonary disease, unspecified COPD type  Hypercholesteremia  Arthritis  Hypothyroidism  Bipolar 1 disorder  Schizo affective schizophrenia  HTN (hypertension)      PLAN:  HPI:  Patient is 74yo F with PMH of HTN, diastolic CHf, HLD, Type 2 DM, COPD/emphysema, cor pulmonale, hypothyroidism, venous insufficiency, bipolar, schizoaffective disorder, GERD, allergic rhinitis, dry eyes, brought in by NH due to  fever of 104F axillary at NH, given cold compresses and Motrin, with continued chills. At NH, patient also noted to be tachycardic to 167bpm with altered mental status and confusion, O2 sat of 87% on room air, with nebulizer treatment and O2 via nasal cannula given. In the ED. patient was CODE SEPSIS due to high fever of 103, tachycardia, and elevated lactate. When examined by me, patient denies all complaints, however able to recall the story as to why she is here. Patient otherwise denies cough, nasal congestion, chest pain, shortness of breath, burning with urination, all other ROS negative. (26 Oct 2018 19:48)    - AWAITING ON FINAL CX AND SENSITIVITY RESULTS. WILL ADJUST ANTIBIOTICS ACCORDING TO SENSITIVITY  - SEPSIS, GRAM NEGATIVE BACTEREMIA, UTI ( KLEBSIELLA ), CELLULITIS OF B/L LOWER EXTREMITIES ON IV UNASYN. ID F/UP DR. NELSON, AND F/UP FINAL CXS AND ANTIBIOTIC SENSITIVITY  - RESOLVED AMS DUE TO METABOLIC ENCEPHALOPATHY  - PROGRESSIVE DEMENTIA  - B/L LE VENOUS STASIS DERMATITIS  - COPD , EMPHYSEMA AND CHRONIC HYPOXIA  - GI AND DVT PROPHYLAXIS  - DR. PARKS

## 2018-10-29 DIAGNOSIS — N39.0 URINARY TRACT INFECTION, SITE NOT SPECIFIED: ICD-10-CM

## 2018-10-29 DIAGNOSIS — L03.90 CELLULITIS, UNSPECIFIED: ICD-10-CM

## 2018-10-29 DIAGNOSIS — R78.81 BACTEREMIA: ICD-10-CM

## 2018-10-29 LAB
-  AMIKACIN: SIGNIFICANT CHANGE UP
-  AZTREONAM: SIGNIFICANT CHANGE UP
-  CEFEPIME: SIGNIFICANT CHANGE UP
-  CEFTAZIDIME: SIGNIFICANT CHANGE UP
-  CIPROFLOXACIN: SIGNIFICANT CHANGE UP
-  GENTAMICIN: SIGNIFICANT CHANGE UP
-  IMIPENEM: SIGNIFICANT CHANGE UP
-  LEVOFLOXACIN: SIGNIFICANT CHANGE UP
-  MEROPENEM: SIGNIFICANT CHANGE UP
-  PIPERACILLIN/TAZOBACTAM: SIGNIFICANT CHANGE UP
-  TOBRAMYCIN: SIGNIFICANT CHANGE UP
GLUCOSE BLDC GLUCOMTR-MCNC: 120 MG/DL — HIGH (ref 70–99)
GLUCOSE BLDC GLUCOMTR-MCNC: 131 MG/DL — HIGH (ref 70–99)
GLUCOSE BLDC GLUCOMTR-MCNC: 143 MG/DL — HIGH (ref 70–99)
GLUCOSE BLDC GLUCOMTR-MCNC: 163 MG/DL — HIGH (ref 70–99)
HCT VFR BLD CALC: 36.3 % — SIGNIFICANT CHANGE UP (ref 34.5–45)
HGB BLD-MCNC: 11.1 G/DL — LOW (ref 11.5–15.5)
MCHC RBC-ENTMCNC: 23.6 PG — LOW (ref 27–34)
MCHC RBC-ENTMCNC: 30.5 GM/DL — LOW (ref 32–36)
MCV RBC AUTO: 77.3 FL — LOW (ref 80–100)
METHOD TYPE: SIGNIFICANT CHANGE UP
PLATELET # BLD AUTO: 224 K/UL — SIGNIFICANT CHANGE UP (ref 150–400)
RBC # BLD: 4.7 M/UL — SIGNIFICANT CHANGE UP (ref 3.8–5.2)
RBC # FLD: 17.2 % — HIGH (ref 10.3–14.5)
WBC # BLD: 8.8 K/UL — SIGNIFICANT CHANGE UP (ref 3.8–10.5)
WBC # FLD AUTO: 8.8 K/UL — SIGNIFICANT CHANGE UP (ref 3.8–10.5)

## 2018-10-29 RX ORDER — HEPARIN SODIUM 5000 [USP'U]/ML
5000 INJECTION INTRAVENOUS; SUBCUTANEOUS EVERY 8 HOURS
Refills: 0 | Status: DISCONTINUED | OUTPATIENT
Start: 2018-10-29 | End: 2018-11-08

## 2018-10-29 RX ORDER — AMIKACIN SULFATE 250 MG/ML
1000 INJECTION, SOLUTION INTRAMUSCULAR; INTRAVENOUS DAILY
Refills: 0 | Status: DISCONTINUED | OUTPATIENT
Start: 2018-10-29 | End: 2018-11-01

## 2018-10-29 RX ADMIN — HEPARIN SODIUM 5000 UNIT(S): 5000 INJECTION INTRAVENOUS; SUBCUTANEOUS at 13:10

## 2018-10-29 RX ADMIN — HEPARIN SODIUM 5000 UNIT(S): 5000 INJECTION INTRAVENOUS; SUBCUTANEOUS at 21:49

## 2018-10-29 RX ADMIN — Medication 1: at 12:54

## 2018-10-29 RX ADMIN — AMPICILLIN SODIUM AND SULBACTAM SODIUM 100 GRAM(S): 250; 125 INJECTION, POWDER, FOR SUSPENSION INTRAMUSCULAR; INTRAVENOUS at 11:43

## 2018-10-29 RX ADMIN — Medication 81 MILLIGRAM(S): at 11:43

## 2018-10-29 RX ADMIN — Medication 1 DROP(S): at 06:05

## 2018-10-29 RX ADMIN — Medication 175 MICROGRAM(S): at 06:05

## 2018-10-29 RX ADMIN — Medication 3 MILLILITER(S): at 14:42

## 2018-10-29 RX ADMIN — AMLODIPINE BESYLATE 10 MILLIGRAM(S): 2.5 TABLET ORAL at 06:05

## 2018-10-29 RX ADMIN — Medication 40 MILLIGRAM(S): at 06:05

## 2018-10-29 RX ADMIN — AMPICILLIN SODIUM AND SULBACTAM SODIUM 100 GRAM(S): 250; 125 INJECTION, POWDER, FOR SUSPENSION INTRAMUSCULAR; INTRAVENOUS at 23:20

## 2018-10-29 RX ADMIN — AMIKACIN SULFATE 104 MILLIGRAM(S): 250 INJECTION, SOLUTION INTRAMUSCULAR; INTRAVENOUS at 21:49

## 2018-10-29 RX ADMIN — BUDESONIDE AND FORMOTEROL FUMARATE DIHYDRATE 2 PUFF(S): 160; 4.5 AEROSOL RESPIRATORY (INHALATION) at 21:50

## 2018-10-29 RX ADMIN — OLANZAPINE 10 MILLIGRAM(S): 15 TABLET, FILM COATED ORAL at 11:41

## 2018-10-29 RX ADMIN — Medication 1 DROP(S): at 17:38

## 2018-10-29 RX ADMIN — Medication 3 MILLILITER(S): at 20:44

## 2018-10-29 RX ADMIN — LACTULOSE 20 GRAM(S): 10 SOLUTION ORAL at 17:38

## 2018-10-29 RX ADMIN — AMPICILLIN SODIUM AND SULBACTAM SODIUM 100 GRAM(S): 250; 125 INJECTION, POWDER, FOR SUSPENSION INTRAMUSCULAR; INTRAVENOUS at 17:38

## 2018-10-29 RX ADMIN — Medication 3 MILLILITER(S): at 08:46

## 2018-10-29 RX ADMIN — Medication 1 TABLET(S): at 11:41

## 2018-10-29 NOTE — PROGRESS NOTE ADULT - PROBLEM SELECTOR PLAN 1
Patient is s/p sepsis, now resolved  -Currently not spiking fevers  -Blood cultures: Pseudomonas	  -Initially she was started on Unasyn, may change to zosyn  -However, patient denies all IV medications and has not received any abx over the weekend   -ID Dr. Aparicio

## 2018-10-29 NOTE — PROGRESS NOTE ADULT - SUBJECTIVE AND OBJECTIVE BOX
PGY 1 Note discussed with supervising resident and primary attending    Patient is a 73y old  Female who presents with a chief complaint of FEVER / SEPSIS (28 Oct 2018 12:40)      INTERVAL HPI/OVERNIGHT EVENTS: no events noted overnight.    MEDICATIONS  (STANDING):  ALBUTerol/ipratropium for Nebulization 3 milliLiter(s) Nebulizer every 6 hours  amLODIPine   Tablet 10 milliGRAM(s) Oral daily  ampicillin/sulbactam  IVPB 1.5 Gram(s) IV Intermittent every 6 hours  artificial tears (preservative free) Ophthalmic Solution 1 Drop(s) Both EYES two times a day  aspirin enteric coated 81 milliGRAM(s) Oral daily  buDESOnide  80 MICROgram(s)/formoterol 4.5 MICROgram(s) Inhaler 2 Puff(s) Inhalation two times a day  furosemide    Tablet 40 milliGRAM(s) Oral daily  insulin lispro (HumaLOG) corrective regimen sliding scale   SubCutaneous three times a day before meals  lactulose Syrup 20 Gram(s) Oral two times a day  levothyroxine 175 MICROGram(s) Oral daily  multivitamin 1 Tablet(s) Oral daily  OLANZapine 10 milliGRAM(s) Oral daily    MEDICATIONS  (PRN):  acetaminophen   Tablet .. 650 milliGRAM(s) Oral every 6 hours PRN Temp greater or equal to 38C (100.4F)  oxyCODONE    5 mG/acetaminophen 325 mG 2 Tablet(s) Oral every 8 hours PRN Severe Pain (7 - 10)      __________________________________________________  REVIEW OF SYSTEMS:    CONSTITUTIONAL: No fever,   EYES: no acute visual disturbances  NECK: No pain or stiffness  RESPIRATORY: No cough; No shortness of breath  CARDIOVASCULAR: No chest pain, no palpitations  GASTROINTESTINAL: No pain. No nausea or vomiting; No diarrhea   NEUROLOGICAL: No headache or numbness, no tremors  MUSCULOSKELETAL: No joint pain, no muscle pain  GENITOURINARY: no dysuria, no frequency, no hesitancy  PSYCHIATRY: no depression , no anxiety  ALL OTHER  ROS negative        Vital Signs Last 24 Hrs  T(C): 36.4 (29 Oct 2018 04:49), Max: 36.9 (28 Oct 2018 21:01)  T(F): 97.6 (29 Oct 2018 04:49), Max: 98.4 (28 Oct 2018 21:01)  HR: 99 (29 Oct 2018 04:49) (99 - 106)  BP: 140/72 (29 Oct 2018 04:49) (125/69 - 140/72)  BP(mean): --  RR: 17 (29 Oct 2018 04:49) (17 - 18)  SpO2: 95% (29 Oct 2018 04:49) (94% - 99%)    ________________________________________________  PHYSICAL EXAM:  GENERAL: NAD  HEENT: Normocephalic;  conjunctivae and sclerae clear; moist mucous membranes;   NECK : supple  CHEST/LUNG: Clear to auscultation bilaterally with good air entry   HEART: S1 S2  regular; no murmurs, gallops or rubs  ABDOMEN: Soft, Nontender, Nondistended; Bowel sounds present  EXTREMITIES: no cyanosis; no edema; no calf tenderness  SKIN: warm and dry; no rash  NERVOUS SYSTEM:  Awake and alert; Oriented  to place, person and time ; no new deficits    _________________________________________________  LABS:                        11.1   12.5  )-----------( 192      ( 28 Oct 2018 06:30 )             35.6               CAPILLARY BLOOD GLUCOSE      POCT Blood Glucose.: 182 mg/dL (28 Oct 2018 21:12)  POCT Blood Glucose.: 185 mg/dL (28 Oct 2018 17:21)  POCT Blood Glucose.: 152 mg/dL (28 Oct 2018 12:36)  POCT Blood Glucose.: 195 mg/dL (28 Oct 2018 11:10)  POCT Blood Glucose.: 164 mg/dL (28 Oct 2018 08:16)        RADIOLOGY & ADDITIONAL TESTS:    Imaging Personally Reviewed:  YES    Consultant(s) Notes Reviewed:   YES    Care Discussed with Consultants : YES     Plan of care was discussed with patient and /or primary care giver; all questions and concerns were addressed and care was aligned with patient's wishes.

## 2018-10-29 NOTE — CONSULT NOTE ADULT - SUBJECTIVE AND OBJECTIVE BOX
Time of visit:    CHIEF COMPLAINT: Patient is a 73y old  Female who presents with a chief complaint of FEVER / SEPSIS (28 Oct 2018 12:40)      HPI:  Patient is 72yo F with PMH of HTN, diastolic CHf, HLD, Type 2 DM, COPD/emphysema, cor pulmonale, hypothyroidism, venous insufficiency, bipolar, schizoaffective disorder, GERD, allergic rhinitis, dry eyes, brought in by NH due to  fever of 104F axillary at NH, given cold compresses and Motrin, with continued chills. At NH, patient also noted to be tachycardic to 167bpm with altered mental status and confusion, O2 sat of 87% on room air, with nebulizer treatment and O2 via nasal cannula given. In the ED. patient was CODE SEPSIS due to high fever of 103, tachycardia, and elevated lactate. When examined by me, patient denies all complaints, however able to recall the story as to why she is here. Patient otherwise denies cough, nasal congestion, chest pain, shortness of breath, burning with urination, all other ROS negative. (26 Oct 2018 19:48)   Patient seen and examined.     PAST MEDICAL & SURGICAL HISTORY:  Interstitial lung disease  Chronic diastolic congestive heart failure  Chronic obstructive pulmonary disease, unspecified COPD type  Hypercholesteremia  Arthritis  Hypothyroidism  Bipolar 1 disorder  Schizo affective schizophrenia  HTN (hypertension)  No significant past surgical history      Allergies    Avelox (Unknown)  moxifloxacin (Unknown)    Intolerances        MEDICATIONS  (STANDING):  ALBUTerol/ipratropium for Nebulization 3 milliLiter(s) Nebulizer every 6 hours  amLODIPine   Tablet 10 milliGRAM(s) Oral daily  ampicillin/sulbactam  IVPB 1.5 Gram(s) IV Intermittent every 6 hours  artificial tears (preservative free) Ophthalmic Solution 1 Drop(s) Both EYES two times a day  aspirin enteric coated 81 milliGRAM(s) Oral daily  buDESOnide  80 MICROgram(s)/formoterol 4.5 MICROgram(s) Inhaler 2 Puff(s) Inhalation two times a day  furosemide    Tablet 40 milliGRAM(s) Oral daily  heparin  Injectable 5000 Unit(s) SubCutaneous every 8 hours  insulin lispro (HumaLOG) corrective regimen sliding scale   SubCutaneous three times a day before meals  lactulose Syrup 20 Gram(s) Oral two times a day  levothyroxine 175 MICROGram(s) Oral daily  multivitamin 1 Tablet(s) Oral daily  OLANZapine 10 milliGRAM(s) Oral daily      MEDICATIONS  (PRN):  acetaminophen   Tablet .. 650 milliGRAM(s) Oral every 6 hours PRN Temp greater or equal to 38C (100.4F)  oxyCODONE    5 mG/acetaminophen 325 mG 2 Tablet(s) Oral every 8 hours PRN Severe Pain (7 - 10)   Medications up to date at time of exam.    Medications up to date at time of exam.    FAMILY HISTORY:  No pertinent family history in first degree relatives      SOCIAL HISTORY  Smoking History: hx of second hand smoke exposure.   Living Condition: [   ] apartment, [   ] private house  Work History:   Travel History: denies recent travel  Illicit Substance Use: denies  Alcohol Use: denies    REVIEW OF SYSTEMS:    CONSTITUTIONAL: No  fevers, chills on exam.    HEENT:  Cannot read small prints. No runny nose.     CARDIOVASCULAR:  No facial grimace of chest pain.     RESPIRATORY:  No s/sx of  SOB. No  cough, wheezing on exam.    GASTROINTESTINAL:  No abdominal pain,  vomiting or diarrhea.    GENITOURINARY: No hematuria.     NEUROLOGIC:  No  tingling, seizures or weakness.    PSYCHIATRIC:  No emotional distress.       PHYSICAL EXAMINATION:      Vital Signs Last 24 Hrs  T(C): 36.4 (29 Oct 2018 04:49), Max: 36.9 (28 Oct 2018 21:01)  T(F): 97.6 (29 Oct 2018 04:49), Max: 98.4 (28 Oct 2018 21:01)  HR: 99 (29 Oct 2018 04:49) (99 - 106)  BP: 140/72 (29 Oct 2018 04:49) (125/69 - 140/72)  BP(mean): --  RR: 17 (29 Oct 2018 04:49) (17 - 18)  SpO2: 95% (29 Oct 2018 04:49) (94% - 99%)   (if applicable)    GENERAL: Awake and lying in bed comfortably. No acute distress.     HEENT: Head is normocephalic and atraumatic. No nasal tenderness. Moist mucosa.     NECK: Supple, no palpable adenopathy.    LUNGS: Fair air entrance. Few rales, no wheezing. No use of accessory muscle.     HEART: S1 S2 Regular rate and no click/ rub.     ABDOMEN: Soft, nontender, and nondistended.  No hepatosplenomegaly is noted. Active bowel sounds.     EXTREMITIES: Without any cyanosis, clubbing, rash, lesions or edema.    NEUROLOGIC: Awake, alert, forgetful.     SKIN: Warm and moist. Non diaphoretic.       LABS:                        11.1   8.8   )-----------( 224      ( 29 Oct 2018 05:58 )             36.3   RADIOLOGY & ADDITIONAL STUDIES:  EKG:   CXR: < from: Xray Chest 1 View-PORTABLE IMMEDIATE (10.26.18 @ 15:55) >    PROCEDURE DATE:  10/26/2018          INTERPRETATION:  CLINICAL STATEMENT: Chest Pain.    TECHNIQUE: AP view of the chest.    COMPARISON: 4/18/2017    FINDINGS/  IMPRESSION:  Increased interstitial lung markings without significant change. No new   consolidation or significant pleural effusion.    Heart size cannot be accurately assessed in this projection.    Chronic deformity both shoulders.        IMPRESSION: 73y Female PAST MEDICAL & SURGICAL HISTORY:  Interstitial lung disease  Chronic diastolic congestive heart failure  Chronic obstructive pulmonary disease, unspecified COPD type  Hypercholesteremia  Arthritis  Hypothyroidism  Bipolar 1 disorder  Schizo affective schizophrenia  HTN (hypertension)  No significant past surgical history     Impression; 72 Y/O Female with prior mentioned multiple chronic conditions . Presented with 104 at NH  with Tachycardia 167 and altered mental status and confusion. Code Sepsis was called in ED. Has COPD with on and off hypoxia. CXR On Medicine Unit. RVP negative.      Suggestion;  Continue oxygen supplementation to keep O2 saturation >90%. Monitor o2 saturation due to chronic hypoxia in NH to monitor for respiratory distress  . O2 saturation 90% room air, with oxygen supplementation 94% NC.    Continue DuoNeb Q 6 hours. Budesonide twice daily.  DVT/ GI prophylactic.   ID consult for Blood culture with +ve Pseudomonas aeruginosa. On Unasyn IVPB.   Aspiration precautions. Time of visit:    CHIEF COMPLAINT: Patient is a 73y old  Female who presents with a chief complaint of FEVER / SEPSIS (28 Oct 2018 12:40)      HPI:  Patient is 72yo F with PMH of HTN, diastolic CHf, HLD, Type 2 DM, COPD/emphysema, cor pulmonale, hypothyroidism, venous insufficiency, bipolar, schizoaffective disorder, GERD, allergic rhinitis, dry eyes, brought in by NH due to  fever of 104F axillary at NH, given cold compresses and Motrin, with continued chills. At NH, patient also noted to be tachycardic to 167bpm with altered mental status and confusion, O2 sat of 87% on room air, with nebulizer treatment and O2 via nasal cannula given. In the ED. patient was CODE SEPSIS due to high fever of 103, tachycardia, and elevated lactate. When examined by me, patient denies all complaints, however able to recall the story as to why she is here. Patient otherwise denies cough, nasal congestion, chest pain, shortness of breath, burning with urination, all other ROS negative. (26 Oct 2018 19:48)   Patient seen and examined.     PAST MEDICAL & SURGICAL HISTORY:  Interstitial lung disease  Chronic diastolic congestive heart failure  Chronic obstructive pulmonary disease, unspecified COPD type  Hypercholesteremia  Arthritis  Hypothyroidism  Bipolar 1 disorder  Schizo affective schizophrenia  HTN (hypertension)  No significant past surgical history      Allergies    Avelox (Unknown)  moxifloxacin (Unknown)    Intolerances        MEDICATIONS  (STANDING):  ALBUTerol/ipratropium for Nebulization 3 milliLiter(s) Nebulizer every 6 hours  amLODIPine   Tablet 10 milliGRAM(s) Oral daily  ampicillin/sulbactam  IVPB 1.5 Gram(s) IV Intermittent every 6 hours  artificial tears (preservative free) Ophthalmic Solution 1 Drop(s) Both EYES two times a day  aspirin enteric coated 81 milliGRAM(s) Oral daily  buDESOnide  80 MICROgram(s)/formoterol 4.5 MICROgram(s) Inhaler 2 Puff(s) Inhalation two times a day  furosemide    Tablet 40 milliGRAM(s) Oral daily  heparin  Injectable 5000 Unit(s) SubCutaneous every 8 hours  insulin lispro (HumaLOG) corrective regimen sliding scale   SubCutaneous three times a day before meals  lactulose Syrup 20 Gram(s) Oral two times a day  levothyroxine 175 MICROGram(s) Oral daily  multivitamin 1 Tablet(s) Oral daily  OLANZapine 10 milliGRAM(s) Oral daily      MEDICATIONS  (PRN):  acetaminophen   Tablet .. 650 milliGRAM(s) Oral every 6 hours PRN Temp greater or equal to 38C (100.4F)  oxyCODONE    5 mG/acetaminophen 325 mG 2 Tablet(s) Oral every 8 hours PRN Severe Pain (7 - 10)   Medications up to date at time of exam.    Medications up to date at time of exam.    FAMILY HISTORY:  No pertinent family history in first degree relatives      SOCIAL HISTORY  Smoking History: hx of second hand smoke exposure.   Living Condition: [   ] apartment, [   ] private house  Work History:   Travel History: denies recent travel  Illicit Substance Use: denies  Alcohol Use: denies    REVIEW OF SYSTEMS:    CONSTITUTIONAL: No  fevers, chills on exam.    HEENT:  Cannot read small prints. No runny nose.     CARDIOVASCULAR:  No facial grimace of chest pain.     RESPIRATORY:  No s/sx of  SOB. No  cough, wheezing on exam.    GASTROINTESTINAL:  No abdominal pain,  vomiting or diarrhea.    GENITOURINARY: No hematuria.     NEUROLOGIC:  No  tingling, seizures or weakness.    PSYCHIATRIC:  No emotional distress.       PHYSICAL EXAMINATION:      Vital Signs Last 24 Hrs  T(C): 36.4 (29 Oct 2018 04:49), Max: 36.9 (28 Oct 2018 21:01)  T(F): 97.6 (29 Oct 2018 04:49), Max: 98.4 (28 Oct 2018 21:01)  HR: 99 (29 Oct 2018 04:49) (99 - 106)  BP: 140/72 (29 Oct 2018 04:49) (125/69 - 140/72)  BP(mean): --  RR: 17 (29 Oct 2018 04:49) (17 - 18)  SpO2: 95% (29 Oct 2018 04:49) (94% - 99%)   (if applicable)    GENERAL: Awake and lying in bed comfortably. No acute distress.     HEENT: Head is normocephalic and atraumatic. No nasal tenderness. Moist mucosa.     NECK: Supple, no palpable adenopathy.    LUNGS: Fair air entrance. Few rales, no wheezing. No use of accessory muscle.     HEART: S1 S2 Regular rate and no click/ rub.     ABDOMEN: Soft, nontender, and nondistended.  No hepatosplenomegaly is noted. Active bowel sounds.     EXTREMITIES: Without any cyanosis, clubbing, rash, lesions or edema.    NEUROLOGIC: Awake, alert, forgetful.     SKIN: Warm and moist. Non diaphoretic.       LABS:                        11.1   8.8   )-----------( 224      ( 29 Oct 2018 05:58 )             36.3   RADIOLOGY & ADDITIONAL STUDIES:  EKG:   CXR: < from: Xray Chest 1 View-PORTABLE IMMEDIATE (10.26.18 @ 15:55) >    PROCEDURE DATE:  10/26/2018          INTERPRETATION:  CLINICAL STATEMENT: Chest Pain.    TECHNIQUE: AP view of the chest.    COMPARISON: 4/18/2017    FINDINGS/  IMPRESSION:  Increased interstitial lung markings without significant change. No new   consolidation or significant pleural effusion.    Heart size cannot be accurately assessed in this projection.    Chronic deformity both shoulders.        IMPRESSION: 73y Female PAST MEDICAL & SURGICAL HISTORY:  Interstitial lung disease  Chronic diastolic congestive heart failure  Chronic obstructive pulmonary disease, unspecified COPD type  Hypercholesteremia  Arthritis  Hypothyroidism  Bipolar 1 disorder  Schizo affective schizophrenia  HTN (hypertension)  No significant past surgical history     Impression; 72 Y/O Female with prior mentioned multiple chronic conditions . Presented with 104 at NH  with Tachycardia 167 and altered mental / medical encephalopathy due to sepsis.  Code Sepsis was called in ED. Has COPD with on and off hypoxia. CXR On Medicine Unit. RVP negative.      Suggestion;  Continue oxygen supplementation to keep O2 saturation >90%. Monitor o2 saturation due to chronic hypoxia in NH to monitor for respiratory distress  . O2 saturation 90% room air, with oxygen supplementation 94% NC.    Continue DuoNeb Q 6 hours. Budesonide twice daily.  DVT/ GI prophylactic.   ID consult for Blood culture with +ve Pseudomonas aeruginosa. On Unasyn IVPB.   Aspiration precautions.

## 2018-10-30 DIAGNOSIS — I87.8 OTHER SPECIFIED DISORDERS OF VEINS: ICD-10-CM

## 2018-10-30 LAB
ANION GAP SERPL CALC-SCNC: 7 MMOL/L — SIGNIFICANT CHANGE UP (ref 5–17)
BUN SERPL-MCNC: 11 MG/DL — SIGNIFICANT CHANGE UP (ref 7–18)
CALCIUM SERPL-MCNC: 9.3 MG/DL — SIGNIFICANT CHANGE UP (ref 8.4–10.5)
CHLORIDE SERPL-SCNC: 101 MMOL/L — SIGNIFICANT CHANGE UP (ref 96–108)
CO2 SERPL-SCNC: 30 MMOL/L — SIGNIFICANT CHANGE UP (ref 22–31)
CREAT SERPL-MCNC: 0.76 MG/DL — SIGNIFICANT CHANGE UP (ref 0.5–1.3)
CULTURE RESULTS: SIGNIFICANT CHANGE UP
CULTURE RESULTS: SIGNIFICANT CHANGE UP
GLUCOSE BLDC GLUCOMTR-MCNC: 143 MG/DL — HIGH (ref 70–99)
GLUCOSE SERPL-MCNC: 145 MG/DL — HIGH (ref 70–99)
HCT VFR BLD CALC: 38.1 % — SIGNIFICANT CHANGE UP (ref 34.5–45)
HGB BLD-MCNC: 11.8 G/DL — SIGNIFICANT CHANGE UP (ref 11.5–15.5)
MCHC RBC-ENTMCNC: 23.9 PG — LOW (ref 27–34)
MCHC RBC-ENTMCNC: 31 GM/DL — LOW (ref 32–36)
MCV RBC AUTO: 76.9 FL — LOW (ref 80–100)
METHOD TYPE: SIGNIFICANT CHANGE UP
METHOD TYPE: SIGNIFICANT CHANGE UP
ORGANISM # SPEC MICROSCOPIC CNT: SIGNIFICANT CHANGE UP
PLATELET # BLD AUTO: 269 K/UL — SIGNIFICANT CHANGE UP (ref 150–400)
POTASSIUM SERPL-MCNC: 3.2 MMOL/L — LOW (ref 3.5–5.3)
POTASSIUM SERPL-SCNC: 3.2 MMOL/L — LOW (ref 3.5–5.3)
RBC # BLD: 4.95 M/UL — SIGNIFICANT CHANGE UP (ref 3.8–5.2)
RBC # FLD: 17.2 % — HIGH (ref 10.3–14.5)
SODIUM SERPL-SCNC: 138 MMOL/L — SIGNIFICANT CHANGE UP (ref 135–145)
SPECIMEN SOURCE: SIGNIFICANT CHANGE UP
SPECIMEN SOURCE: SIGNIFICANT CHANGE UP
WBC # BLD: 7.2 K/UL — SIGNIFICANT CHANGE UP (ref 3.8–10.5)
WBC # FLD AUTO: 7.2 K/UL — SIGNIFICANT CHANGE UP (ref 3.8–10.5)

## 2018-10-30 RX ORDER — POTASSIUM CHLORIDE 20 MEQ
40 PACKET (EA) ORAL ONCE
Refills: 0 | Status: COMPLETED | OUTPATIENT
Start: 2018-10-30 | End: 2018-10-30

## 2018-10-30 RX ADMIN — Medication 175 MICROGRAM(S): at 05:37

## 2018-10-30 RX ADMIN — Medication 1 TABLET(S): at 11:32

## 2018-10-30 RX ADMIN — Medication 3 MILLILITER(S): at 14:55

## 2018-10-30 RX ADMIN — Medication 40 MILLIEQUIVALENT(S): at 11:29

## 2018-10-30 RX ADMIN — AMPICILLIN SODIUM AND SULBACTAM SODIUM 100 GRAM(S): 250; 125 INJECTION, POWDER, FOR SUSPENSION INTRAMUSCULAR; INTRAVENOUS at 05:37

## 2018-10-30 RX ADMIN — Medication 3 MILLILITER(S): at 09:22

## 2018-10-30 RX ADMIN — Medication 1 DROP(S): at 05:37

## 2018-10-30 RX ADMIN — HEPARIN SODIUM 5000 UNIT(S): 5000 INJECTION INTRAVENOUS; SUBCUTANEOUS at 21:16

## 2018-10-30 RX ADMIN — Medication 40 MILLIGRAM(S): at 05:37

## 2018-10-30 RX ADMIN — HEPARIN SODIUM 5000 UNIT(S): 5000 INJECTION INTRAVENOUS; SUBCUTANEOUS at 05:38

## 2018-10-30 RX ADMIN — AMPICILLIN SODIUM AND SULBACTAM SODIUM 100 GRAM(S): 250; 125 INJECTION, POWDER, FOR SUSPENSION INTRAMUSCULAR; INTRAVENOUS at 11:33

## 2018-10-30 RX ADMIN — Medication 3 MILLILITER(S): at 20:53

## 2018-10-30 RX ADMIN — Medication 81 MILLIGRAM(S): at 11:33

## 2018-10-30 RX ADMIN — LACTULOSE 20 GRAM(S): 10 SOLUTION ORAL at 05:36

## 2018-10-30 RX ADMIN — BUDESONIDE AND FORMOTEROL FUMARATE DIHYDRATE 2 PUFF(S): 160; 4.5 AEROSOL RESPIRATORY (INHALATION) at 21:15

## 2018-10-30 RX ADMIN — OLANZAPINE 10 MILLIGRAM(S): 15 TABLET, FILM COATED ORAL at 11:32

## 2018-10-30 RX ADMIN — AMIKACIN SULFATE 104 MILLIGRAM(S): 250 INJECTION, SOLUTION INTRAMUSCULAR; INTRAVENOUS at 11:39

## 2018-10-30 RX ADMIN — HEPARIN SODIUM 5000 UNIT(S): 5000 INJECTION INTRAVENOUS; SUBCUTANEOUS at 13:24

## 2018-10-30 RX ADMIN — BUDESONIDE AND FORMOTEROL FUMARATE DIHYDRATE 2 PUFF(S): 160; 4.5 AEROSOL RESPIRATORY (INHALATION) at 11:34

## 2018-10-30 RX ADMIN — AMLODIPINE BESYLATE 10 MILLIGRAM(S): 2.5 TABLET ORAL at 05:37

## 2018-10-30 NOTE — PROVIDER CONTACT NOTE (CRITICAL VALUE NOTIFICATION) - TEST AND RESULT REPORTED:
Preliminary result 10/26 in aerobic bottle gram negative rods
BC x2 (10/27) growth in aerobic bottle psuedomones aeruginosa carbopenem resistant
Critical value obtained  from [facility] blood culture collected on 10/26 result as : growth in aerobic bottle grand negative franck.

## 2018-10-30 NOTE — PROGRESS NOTE ADULT - SUBJECTIVE AND OBJECTIVE BOX
Time of Visit:  Patient seen and examined.     MEDICATIONS  (STANDING):  ALBUTerol/ipratropium for Nebulization 3 milliLiter(s) Nebulizer every 6 hours  amiKACIN  IVPB 1000 milliGRAM(s) IV Intermittent daily  amLODIPine   Tablet 10 milliGRAM(s) Oral daily  ampicillin/sulbactam  IVPB 1.5 Gram(s) IV Intermittent every 6 hours  artificial tears (preservative free) Ophthalmic Solution 1 Drop(s) Both EYES two times a day  aspirin enteric coated 81 milliGRAM(s) Oral daily  buDESOnide  80 MICROgram(s)/formoterol 4.5 MICROgram(s) Inhaler 2 Puff(s) Inhalation two times a day  furosemide    Tablet 40 milliGRAM(s) Oral daily  heparin  Injectable 5000 Unit(s) SubCutaneous every 8 hours  insulin lispro (HumaLOG) corrective regimen sliding scale   SubCutaneous three times a day before meals  lactulose Syrup 20 Gram(s) Oral two times a day  levothyroxine 175 MICROGram(s) Oral daily  multivitamin 1 Tablet(s) Oral daily  OLANZapine 10 milliGRAM(s) Oral daily      MEDICATIONS  (PRN):  acetaminophen   Tablet .. 650 milliGRAM(s) Oral every 6 hours PRN Temp greater or equal to 38C (100.4F)  oxyCODONE    5 mG/acetaminophen 325 mG 2 Tablet(s) Oral every 8 hours PRN Severe Pain (7 - 10)       Medications up to date at time of exam.    ROS; No fever, chills, SOB, cough, congestion.  PHYSICAL EXAMINATION:    Vital Signs Last 24 Hrs  T(C): 36.3 (30 Oct 2018 05:06), Max: 36.7 (29 Oct 2018 14:54)  T(F): 97.3 (30 Oct 2018 05:06), Max: 98 (29 Oct 2018 14:54)  HR: 84 (30 Oct 2018 05:06) (84 - 96)  BP: 117/82 (30 Oct 2018 05:06) (117/82 - 144/80)  BP(mean): --  RR: 18 (30 Oct 2018 05:06) (18 - 18)  SpO2: 100% (30 Oct 2018 05:06) (95% - 100%)   (if applicable)    GENERAL: Awake and lying in bed comfortably. No acute distress.     HEENT: Normocephalic and atraumatic. No nasal tenderness. Moist mucosa.     NECK: Supple, no palpable adenopathy.    LUNGS: Fair air entrance. Few rales, no wheezing. No use of accessory muscle.     HEART: S1 S2 Regular rate and no click/ rub.     ABDOMEN: Soft, nontender, and nondistended.  Active bowel sounds. No abdominal guarding.    EXTREMITIES: Without any cyanosis, clubbing, rash, lesions or edema.    NEUROLOGIC: Awake, alert, forgetful.     SKIN: Warm and moist. Non diaphoretic.       LABS:                        11.8   7.2   )-----------( 269      ( 30 Oct 2018 07:06 )             38.1     10-30    138  |  101  |  11  ----------------------------<  145<H>  3.2<L>   |  30  |  0.76    Ca    9.3      30 Oct 2018 07:06                          MICROBIOLOGY: (if applicable)    RADIOLOGY & ADDITIONAL STUDIES:  EKG:   CXR: < from: Xray Chest 1 View-PORTABLE IMMEDIATE (10.26.18 @ 15:55) >    PROCEDURE DATE:  10/26/2018          INTERPRETATION:  CLINICAL STATEMENT: Chest Pain.    TECHNIQUE: AP view of the chest.    COMPARISON: 4/18/2017    FINDINGS/  IMPRESSION:  Increased interstitial lung markings without significant change. No new   consolidation or significant pleural effusion.    Heart size cannot be accurately assessed in this projection.    Chronic deformity both shoulders.      IMPRESSION: 73y Female PAST MEDICAL & SURGICAL HISTORY:  Interstitial lung disease  Chronic diastolic congestive heart failure  Chronic obstructive pulmonary disease, unspecified COPD type  Hypercholesteremia  Arthritis  Hypothyroidism  Bipolar 1 disorder  Schizo affective schizophrenia  HTN (hypertension)  No significant past surgical history  Impression; 74 Y/O Female with prior mentioned multiple chronic conditions . Presented with 104 at NH  with Tachycardia 167 and altered mental status and confusion. Code Sepsis was called in ED. Has COPD with on and off hypoxia. On Medicine Unit. RVP negative.      Suggestion;  Continue oxygen supplementation to keep O2 saturation >90%. Monitor o2 saturation due to chronic hypoxia in NH to monitor for respiratory distress  . O2 saturation 94% room air, off oxygen supplementation on exam, saturating good.  Continue DuoNeb Q 6 hours. Budesonide twice daily.  DVT/ GI prophylactic.   Continue antibiotic as per ID recommendation.    Aspiration precautions. Time of Visit:  Patient seen and examined.     MEDICATIONS  (STANDING):  ALBUTerol/ipratropium for Nebulization 3 milliLiter(s) Nebulizer every 6 hours  amiKACIN  IVPB 1000 milliGRAM(s) IV Intermittent daily  amLODIPine   Tablet 10 milliGRAM(s) Oral daily  ampicillin/sulbactam  IVPB 1.5 Gram(s) IV Intermittent every 6 hours  artificial tears (preservative free) Ophthalmic Solution 1 Drop(s) Both EYES two times a day  aspirin enteric coated 81 milliGRAM(s) Oral daily  buDESOnide  80 MICROgram(s)/formoterol 4.5 MICROgram(s) Inhaler 2 Puff(s) Inhalation two times a day  furosemide    Tablet 40 milliGRAM(s) Oral daily  heparin  Injectable 5000 Unit(s) SubCutaneous every 8 hours  insulin lispro (HumaLOG) corrective regimen sliding scale   SubCutaneous three times a day before meals  lactulose Syrup 20 Gram(s) Oral two times a day  levothyroxine 175 MICROGram(s) Oral daily  multivitamin 1 Tablet(s) Oral daily  OLANZapine 10 milliGRAM(s) Oral daily      MEDICATIONS  (PRN):  acetaminophen   Tablet .. 650 milliGRAM(s) Oral every 6 hours PRN Temp greater or equal to 38C (100.4F)  oxyCODONE    5 mG/acetaminophen 325 mG 2 Tablet(s) Oral every 8 hours PRN Severe Pain (7 - 10)       Medications up to date at time of exam.    ROS; No fever, chills, SOB, cough, congestion.  PHYSICAL EXAMINATION:    Vital Signs Last 24 Hrs  T(C): 36.3 (30 Oct 2018 05:06), Max: 36.7 (29 Oct 2018 14:54)  T(F): 97.3 (30 Oct 2018 05:06), Max: 98 (29 Oct 2018 14:54)  HR: 84 (30 Oct 2018 05:06) (84 - 96)  BP: 117/82 (30 Oct 2018 05:06) (117/82 - 144/80)  BP(mean): --  RR: 18 (30 Oct 2018 05:06) (18 - 18)  SpO2: 100% (30 Oct 2018 05:06) (95% - 100%)   (if applicable)    GENERAL: Awake and lying in bed comfortably. No acute distress.     HEENT: Normocephalic and atraumatic. No nasal tenderness. Moist mucosa.     NECK: Supple, no palpable adenopathy.    LUNGS: Fair air entrance. Few rales, no wheezing. No use of accessory muscle.     HEART: S1 S2 Regular rate and no click/ rub.     ABDOMEN: Soft, nontender, and nondistended.  Active bowel sounds. No abdominal guarding.    EXTREMITIES: Without any cyanosis, clubbing, rash, lesions or edema.    NEUROLOGIC: Awake, alert, forgetful.     SKIN: Warm and moist. Non diaphoretic.       LABS:                        11.8   7.2   )-----------( 269      ( 30 Oct 2018 07:06 )             38.1     10-30    138  |  101  |  11  ----------------------------<  145<H>  3.2<L>   |  30  |  0.76    Ca    9.3      30 Oct 2018 07:06                          MICROBIOLOGY: (if applicable)    RADIOLOGY & ADDITIONAL STUDIES:  EKG:   CXR: < from: Xray Chest 1 View-PORTABLE IMMEDIATE (10.26.18 @ 15:55) >    PROCEDURE DATE:  10/26/2018          INTERPRETATION:  CLINICAL STATEMENT: Chest Pain.    TECHNIQUE: AP view of the chest.    COMPARISON: 4/18/2017    FINDINGS/  IMPRESSION:  Increased interstitial lung markings without significant change. No new   consolidation or significant pleural effusion.    Heart size cannot be accurately assessed in this projection.    Chronic deformity both shoulders.      IMPRESSION: 73y Female PAST MEDICAL & SURGICAL HISTORY:  Interstitial lung disease  Chronic diastolic congestive heart failure  Chronic obstructive pulmonary disease, unspecified COPD type  Hypercholesteremia  Arthritis  Hypothyroidism  Bipolar 1 disorder  Schizo affective schizophrenia  HTN (hypertension)  No significant past surgical history  Impression; 72 Y/O Female with prior mentioned multiple chronic conditions . Presented with 104 at NH  with Tachycardia 167 and altered mental status and confusion. Code Sepsis was called in ED. Has COPD with on and off hypoxia. On Medicine Unit. RVP negative.       Suggestion;  Continue oxygen supplementation to keep O2 saturation >90%. Monitor o2 saturation due to chronic hypoxia in NH to monitor for respiratory distress  . O2 saturation 94% room air, off oxygen supplementation on exam, saturating good.  Continue DuoNeb Q 6 hours. Budesonide twice daily.  DVT/ GI prophylactic.   Continue antibiotic as per ID recommendation.    Aspiration precautions.      Agree with above assessment and plan as transcribed.

## 2018-10-30 NOTE — PROGRESS NOTE ADULT - SUBJECTIVE AND OBJECTIVE BOX
PGY 1 Note discussed with supervising resident and primary attending    Patient is a 73y old  Female who presents with a chief complaint of Fever/Sepsis (30 Oct 2018 11:18)      INTERVAL HPI/OVERNIGHT EVENTS: no events noted overnight.    MEDICATIONS  (STANDING):  ALBUTerol/ipratropium for Nebulization 3 milliLiter(s) Nebulizer every 6 hours  amiKACIN  IVPB 1000 milliGRAM(s) IV Intermittent daily  amLODIPine   Tablet 10 milliGRAM(s) Oral daily  ampicillin/sulbactam  IVPB 1.5 Gram(s) IV Intermittent every 6 hours  artificial tears (preservative free) Ophthalmic Solution 1 Drop(s) Both EYES two times a day  aspirin enteric coated 81 milliGRAM(s) Oral daily  buDESOnide  80 MICROgram(s)/formoterol 4.5 MICROgram(s) Inhaler 2 Puff(s) Inhalation two times a day  furosemide    Tablet 40 milliGRAM(s) Oral daily  heparin  Injectable 5000 Unit(s) SubCutaneous every 8 hours  insulin lispro (HumaLOG) corrective regimen sliding scale   SubCutaneous three times a day before meals  lactulose Syrup 20 Gram(s) Oral two times a day  levothyroxine 175 MICROGram(s) Oral daily  multivitamin 1 Tablet(s) Oral daily  OLANZapine 10 milliGRAM(s) Oral daily    MEDICATIONS  (PRN):  acetaminophen   Tablet .. 650 milliGRAM(s) Oral every 6 hours PRN Temp greater or equal to 38C (100.4F)  oxyCODONE    5 mG/acetaminophen 325 mG 2 Tablet(s) Oral every 8 hours PRN Severe Pain (7 - 10)      __________________________________________________  REVIEW OF SYSTEMS:    CONSTITUTIONAL: No fever,   EYES: no acute visual disturbances  NECK: No pain or stiffness  RESPIRATORY: No cough; No shortness of breath  CARDIOVASCULAR: No chest pain, no palpitations  GASTROINTESTINAL: No pain. No nausea or vomiting; No diarrhea   NEUROLOGICAL: No headache or numbness, no tremors  MUSCULOSKELETAL: No joint pain, no muscle pain  GENITOURINARY: no dysuria, no frequency, no hesitancy  PSYCHIATRY: no depression , no anxiety  ALL OTHER  ROS negative        Vital Signs Last 24 Hrs  T(C): 36.8 (30 Oct 2018 14:02), Max: 36.8 (30 Oct 2018 14:02)  T(F): 98.2 (30 Oct 2018 14:02), Max: 98.2 (30 Oct 2018 14:02)  HR: 74 (30 Oct 2018 14:02) (74 - 96)  BP: 135/76 (30 Oct 2018 14:02) (117/82 - 144/80)  BP(mean): --  RR: 18 (30 Oct 2018 14:02) (18 - 18)  SpO2: 100% (30 Oct 2018 14:02) (95% - 100%)    ________________________________________________  PHYSICAL EXAM:  GENERAL: NAD  HEENT: Normocephalic;  conjunctivae and sclerae clear; moist mucous membranes;   NECK : supple  CHEST/LUNG: Clear to auscultation bilaterally with good air entry   HEART: S1 S2  regular; no murmurs, gallops or rubs  ABDOMEN: Soft, Nontender, Nondistended; Bowel sounds present  EXTREMITIES: no cyanosis; no edema; no calf tenderness  SKIN: warm and dry; no rash  NERVOUS SYSTEM:  Awake and alert; Oriented  to place, person and time ; no new deficits    _________________________________________________  LABS:                        11.8   7.2   )-----------( 269      ( 30 Oct 2018 07:06 )             38.1     10-30    138  |  101  |  11  ----------------------------<  145<H>  3.2<L>   |  30  |  0.76    Ca    9.3      30 Oct 2018 07:06          CAPILLARY BLOOD GLUCOSE      POCT Blood Glucose.: 143 mg/dL (30 Oct 2018 08:18)  POCT Blood Glucose.: 131 mg/dL (29 Oct 2018 21:13)  POCT Blood Glucose.: 120 mg/dL (29 Oct 2018 16:51)        RADIOLOGY & ADDITIONAL TESTS:    Imaging Personally Reviewed:  YES    Consultant(s) Notes Reviewed:   YES    Care Discussed with Consultants : YES     Plan of care was discussed with patient and /or primary care giver; all questions and concerns were addressed and care was aligned with patient's wishes.

## 2018-10-30 NOTE — PROGRESS NOTE ADULT - PROBLEM SELECTOR PLAN 1
Patient is s/p sepsis, now resolved  -Currently not spiking fevers  -Blood cultures: Pseudomonas	  -Started her on Amikacin, unasyn discontinued  -Repeat blood cultures awaiting results.  -Patient will need at least 10 days of IV abx.  -ID Dr. Aparicio

## 2018-10-30 NOTE — CONSULT NOTE ADULT - ASSESSMENT
Pseudomonas bacteremia - carbapenem resistant  s/p sepsis  fevers - improved  Leukocytosis - improved    plan - dc unasyn  started Amikacin 1000mgs iv q24hrs  repeat blood cultures on 10/30/18 in am (ordered) - will need at least 10 days of IV therapy with an agent that is active

## 2018-10-30 NOTE — CHART NOTE - NSCHARTNOTEFT_GEN_A_CORE
Final 2 bottles of 10/27 shown to grow Carbapenem-resistant Pseudomonas aeruginosa. Repeat BCx 10/29 showing no growth to date.

## 2018-10-30 NOTE — CONSULT NOTE ADULT - SUBJECTIVE AND OBJECTIVE BOX
HPI:  Patient is 74yo F with PMH of HTN, diastolic CHf, HLD, Type 2 DM, COPD/emphysema, cor pulmonale, hypothyroidism, venous insufficiency, bipolar, schizoaffective disorder, GERD, allergic rhinitis, dry eyes, brought in by NH due to  fever of 104F axillary at NH, given cold compresses and Motrin, with continued chills. At NH, patient also noted to be tachycardic to 167bpm with altered mental status and confusion, O2 sat of 87% on room air, with nebulizer treatment and O2 via nasal cannula given. In the ED. patient was CODE SEPSIS due to high fever of 103, tachycardia, and elevated lactate.   I had seen the patient yesterday afternoon but forgot to start my note yesterday and so writing it today. Asked to eval pt as she was found to have a carbapenem resistant Pseudomonas. she is sleepy and not answering my questions at all and only nods yes to certain statements.  She has been on unasyn and her wbc count has been decreasing and her fevers have gone away despite having a resistant organism. she is not giving me any history at all to determine where her infection might  be coming from.  I dced her unasyn and started her on Amikacin 1000mgs iv q24 hrs yesterday and called core lab to test against ceftazime- avibactum and ceftaroline.       PAST MEDICAL & SURGICAL HISTORY:  Interstitial lung disease  Chronic diastolic congestive heart failure  Chronic obstructive pulmonary disease, unspecified COPD type  Hypercholesteremia  Arthritis  Hypothyroidism  Bipolar 1 disorder  Schizo affective schizophrenia  HTN (hypertension)  No significant past surgical history      Avelox (Unknown)  moxifloxacin (Unknown)      Meds:  acetaminophen   Tablet .. 650 milliGRAM(s) Oral every 6 hours PRN  ALBUTerol/ipratropium for Nebulization 3 milliLiter(s) Nebulizer every 6 hours  amiKACIN  IVPB 1000 milliGRAM(s) IV Intermittent daily  amLODIPine   Tablet 10 milliGRAM(s) Oral daily  ampicillin/sulbactam  IVPB 1.5 Gram(s) IV Intermittent every 6 hours  artificial tears (preservative free) Ophthalmic Solution 1 Drop(s) Both EYES two times a day  aspirin enteric coated 81 milliGRAM(s) Oral daily  buDESOnide  80 MICROgram(s)/formoterol 4.5 MICROgram(s) Inhaler 2 Puff(s) Inhalation two times a day  furosemide    Tablet 40 milliGRAM(s) Oral daily  heparin  Injectable 5000 Unit(s) SubCutaneous every 8 hours  insulin lispro (HumaLOG) corrective regimen sliding scale   SubCutaneous three times a day before meals  lactulose Syrup 20 Gram(s) Oral two times a day  levothyroxine 175 MICROGram(s) Oral daily  multivitamin 1 Tablet(s) Oral daily  OLANZapine 10 milliGRAM(s) Oral daily  oxyCODONE    5 mG/acetaminophen 325 mG 2 Tablet(s) Oral every 8 hours PRN      SOCIAL HISTORY: unknown    FAMILY HISTORY:  No pertinent family history in first degree relatives      VITALS:  Vital Signs Last 24 Hrs  T(C): 36.3 (30 Oct 2018 05:06), Max: 36.7 (29 Oct 2018 14:54)  T(F): 97.3 (30 Oct 2018 05:06), Max: 98 (29 Oct 2018 14:54)  HR: 84 (30 Oct 2018 05:06) (84 - 96)  BP: 117/82 (30 Oct 2018 05:06) (117/82 - 144/80)  BP(mean): --  RR: 18 (30 Oct 2018 05:06) (18 - 18)  SpO2: 100% (30 Oct 2018 05:06) (95% - 100%)    LABS/DIAGNOSTIC TESTS:                          11.8   7.2   )-----------( 269      ( 30 Oct 2018 07:06 )             38.1     WBC Count: 7.2 K/uL (10-30 @ 07:06)  WBC Count: 8.8 K/uL (10-29 @ 05:58)  WBC Count: 12.5 K/uL (10-28 @ 06:30)      10-30    138  |  101  |  11  ----------------------------<  145<H>  3.2<L>   |  30  |  0.76    Ca    9.3      30 Oct 2018 07:06                    LACTATE:    ABG -     CULTURES:   .Blood Blood-Peripheral  10-27 @ 00:14   Growth in aerobic bottle: Pseudomonas aeruginosa (Carbapenem Resistant)      .Urine Clean Catch (Midstream)  10-26 @ 23:47   >100,000 CFU/ml Klebsiella pneumoniae  --  Klebsiella pneumoniae          RADIOLOGY:< from: Xray Chest 1 View-PORTABLE IMMEDIATE (10.26.18 @ 15:55) >  EXAM:  XR CHEST PORTABLE IMMED 1V                            PROCEDURE DATE:  10/26/2018          INTERPRETATION:  CLINICAL STATEMENT: Chest Pain.    TECHNIQUE: AP view of the chest.    COMPARISON: 4/18/2017    FINDINGS/  IMPRESSION:  Increased interstitial lung markings without significant change. No new   consolidation or significant pleural effusion.    Heart size cannot be accurately assessed in this projection.    Chronic deformity both shoulders.        < end of copied text >        ROS  [ x ] UNABLE TO ELICIT

## 2018-10-31 LAB
ANION GAP SERPL CALC-SCNC: 9 MMOL/L — SIGNIFICANT CHANGE UP (ref 5–17)
BUN SERPL-MCNC: 8 MG/DL — SIGNIFICANT CHANGE UP (ref 7–18)
CALCIUM SERPL-MCNC: 9.7 MG/DL — SIGNIFICANT CHANGE UP (ref 8.4–10.5)
CHLORIDE SERPL-SCNC: 100 MMOL/L — SIGNIFICANT CHANGE UP (ref 96–108)
CO2 SERPL-SCNC: 28 MMOL/L — SIGNIFICANT CHANGE UP (ref 22–31)
CREAT SERPL-MCNC: 0.59 MG/DL — SIGNIFICANT CHANGE UP (ref 0.5–1.3)
GLUCOSE BLDC GLUCOMTR-MCNC: 144 MG/DL — HIGH (ref 70–99)
GLUCOSE BLDC GLUCOMTR-MCNC: 146 MG/DL — HIGH (ref 70–99)
GLUCOSE SERPL-MCNC: 131 MG/DL — HIGH (ref 70–99)
HCT VFR BLD CALC: 39.9 % — SIGNIFICANT CHANGE UP (ref 34.5–45)
HGB BLD-MCNC: 12.3 G/DL — SIGNIFICANT CHANGE UP (ref 11.5–15.5)
MCHC RBC-ENTMCNC: 23.8 PG — LOW (ref 27–34)
MCHC RBC-ENTMCNC: 30.8 GM/DL — LOW (ref 32–36)
MCV RBC AUTO: 77 FL — LOW (ref 80–100)
PLATELET # BLD AUTO: 264 K/UL — SIGNIFICANT CHANGE UP (ref 150–400)
POTASSIUM SERPL-MCNC: 3.1 MMOL/L — LOW (ref 3.5–5.3)
POTASSIUM SERPL-SCNC: 3.1 MMOL/L — LOW (ref 3.5–5.3)
RBC # BLD: 5.18 M/UL — SIGNIFICANT CHANGE UP (ref 3.8–5.2)
RBC # FLD: 17.3 % — HIGH (ref 10.3–14.5)
SODIUM SERPL-SCNC: 137 MMOL/L — SIGNIFICANT CHANGE UP (ref 135–145)
WBC # BLD: 9.8 K/UL — SIGNIFICANT CHANGE UP (ref 3.8–10.5)
WBC # FLD AUTO: 9.8 K/UL — SIGNIFICANT CHANGE UP (ref 3.8–10.5)

## 2018-10-31 RX ADMIN — AMLODIPINE BESYLATE 10 MILLIGRAM(S): 2.5 TABLET ORAL at 05:48

## 2018-10-31 RX ADMIN — BUDESONIDE AND FORMOTEROL FUMARATE DIHYDRATE 2 PUFF(S): 160; 4.5 AEROSOL RESPIRATORY (INHALATION) at 12:40

## 2018-10-31 RX ADMIN — OLANZAPINE 10 MILLIGRAM(S): 15 TABLET, FILM COATED ORAL at 19:15

## 2018-10-31 RX ADMIN — Medication 3 MILLILITER(S): at 15:28

## 2018-10-31 RX ADMIN — HEPARIN SODIUM 5000 UNIT(S): 5000 INJECTION INTRAVENOUS; SUBCUTANEOUS at 13:09

## 2018-10-31 RX ADMIN — Medication 3 MILLILITER(S): at 21:24

## 2018-10-31 RX ADMIN — Medication 3 MILLILITER(S): at 09:23

## 2018-10-31 RX ADMIN — HEPARIN SODIUM 5000 UNIT(S): 5000 INJECTION INTRAVENOUS; SUBCUTANEOUS at 05:47

## 2018-10-31 RX ADMIN — Medication 81 MILLIGRAM(S): at 12:40

## 2018-10-31 RX ADMIN — AMIKACIN SULFATE 104 MILLIGRAM(S): 250 INJECTION, SOLUTION INTRAMUSCULAR; INTRAVENOUS at 12:38

## 2018-10-31 RX ADMIN — Medication 40 MILLIGRAM(S): at 05:47

## 2018-10-31 RX ADMIN — BUDESONIDE AND FORMOTEROL FUMARATE DIHYDRATE 2 PUFF(S): 160; 4.5 AEROSOL RESPIRATORY (INHALATION) at 21:19

## 2018-10-31 RX ADMIN — Medication 175 MICROGRAM(S): at 05:48

## 2018-10-31 RX ADMIN — HEPARIN SODIUM 5000 UNIT(S): 5000 INJECTION INTRAVENOUS; SUBCUTANEOUS at 21:20

## 2018-10-31 RX ADMIN — Medication 1 TABLET(S): at 19:15

## 2018-10-31 NOTE — PHYSICAL THERAPY INITIAL EVALUATION ADULT - PASSIVE RANGE OF MOTION EXAMINATION, REHAB EVAL
except b/l shoulders 0-25 deg flx (chronic from old injury)/bilateral upper extremity Passive ROM was WFL (within functional limits)/bilateral lower extremity Passive ROM was WFL (within functional limits)

## 2018-10-31 NOTE — PROGRESS NOTE ADULT - PROBLEM SELECTOR PLAN 1
Patient is s/p sepsis, now resolved  -Currently not spiking fevers  -Blood cultures: Pseudomonas	  -Started her on Amikacin, unasyn discontinued  -Patient will need at least 10 days of IV abx.  -Repeat cultures from 10/29 are negative till date  -ID Dr. Aparicio

## 2018-10-31 NOTE — PHYSICAL THERAPY INITIAL EVALUATION ADULT - LIVES WITH, PROFILE
currently from rehab, prior to that was living with children in a house with several stairs to enter

## 2018-10-31 NOTE — PROGRESS NOTE ADULT - SUBJECTIVE AND OBJECTIVE BOX
PGY 1 Note discussed with supervising resident and primary attending    Patient is a 73y old  Female who presents with a chief complaint of Fever/Sepsis (30 Oct 2018 11:18)      INTERVAL HPI/OVERNIGHT EVENTS: no events noted overnight.    MEDICATIONS  (STANDING):  ALBUTerol/ipratropium for Nebulization 3 milliLiter(s) Nebulizer every 6 hours  amiKACIN  IVPB 1000 milliGRAM(s) IV Intermittent daily  amLODIPine   Tablet 10 milliGRAM(s) Oral daily  artificial tears (preservative free) Ophthalmic Solution 1 Drop(s) Both EYES two times a day  aspirin enteric coated 81 milliGRAM(s) Oral daily  buDESOnide  80 MICROgram(s)/formoterol 4.5 MICROgram(s) Inhaler 2 Puff(s) Inhalation two times a day  furosemide    Tablet 40 milliGRAM(s) Oral daily  heparin  Injectable 5000 Unit(s) SubCutaneous every 8 hours  insulin lispro (HumaLOG) corrective regimen sliding scale   SubCutaneous three times a day before meals  lactulose Syrup 20 Gram(s) Oral two times a day  levothyroxine 175 MICROGram(s) Oral daily  multivitamin 1 Tablet(s) Oral daily  OLANZapine 10 milliGRAM(s) Oral daily    MEDICATIONS  (PRN):  acetaminophen   Tablet .. 650 milliGRAM(s) Oral every 6 hours PRN Temp greater or equal to 38C (100.4F)  oxyCODONE    5 mG/acetaminophen 325 mG 2 Tablet(s) Oral every 8 hours PRN Severe Pain (7 - 10)      __________________________________________________  REVIEW OF SYSTEMS:    CONSTITUTIONAL: No fever,   EYES: no acute visual disturbances  NECK: No pain or stiffness  RESPIRATORY: No cough; No shortness of breath  CARDIOVASCULAR: No chest pain, no palpitations  GASTROINTESTINAL: No pain. No nausea or vomiting; No diarrhea   NEUROLOGICAL: No headache or numbness, no tremors  MUSCULOSKELETAL: No joint pain, no muscle pain  GENITOURINARY: no dysuria, no frequency, no hesitancy  PSYCHIATRY: no depression , no anxiety  ALL OTHER  ROS negative        Vital Signs Last 24 Hrs  T(C): 36.7 (31 Oct 2018 05:06), Max: 36.8 (30 Oct 2018 14:02)  T(F): 98 (31 Oct 2018 05:06), Max: 98.2 (30 Oct 2018 14:02)  HR: 98 (31 Oct 2018 05:06) (51 - 98)  BP: 137/89 (31 Oct 2018 05:06) (135/76 - 147/76)  BP(mean): --  RR: 18 (31 Oct 2018 05:06) (18 - 18)  SpO2: 96% (31 Oct 2018 05:06) (96% - 100%)    ________________________________________________  PHYSICAL EXAM:  GENERAL: NAD  HEENT: Normocephalic;  conjunctivae and sclerae clear; moist mucous membranes;   NECK : supple  CHEST/LUNG: Clear to auscultation bilaterally with good air entry   HEART: S1 S2  regular; no murmurs, gallops or rubs  ABDOMEN: Soft, Nontender, Nondistended; Bowel sounds present  EXTREMITIES: no cyanosis; no edema; no calf tenderness  SKIN: warm and dry; no rash  NERVOUS SYSTEM:  Awake and alert; Oriented  to place, person and time ; no new deficits    _________________________________________________  LABS:                        11.8   7.2   )-----------( 269      ( 30 Oct 2018 07:06 )             38.1     10-30    138  |  101  |  11  ----------------------------<  145<H>  3.2<L>   |  30  |  0.76    Ca    9.3      30 Oct 2018 07:06          CAPILLARY BLOOD GLUCOSE      POCT Blood Glucose.: 143 mg/dL (30 Oct 2018 08:18)        RADIOLOGY & ADDITIONAL TESTS:    Imaging Personally Reviewed:  YES    Consultant(s) Notes Reviewed:   YES    Care Discussed with Consultants : YES     Plan of care was discussed with patient and /or primary care giver; all questions and concerns were addressed and care was aligned with patient's wishes.

## 2018-10-31 NOTE — PHYSICAL THERAPY INITIAL EVALUATION ADULT - ACTIVE RANGE OF MOTION EXAMINATION, REHAB EVAL
except b/l shoulders 0-25 deg flx (chronic from old injury) lt hip ~1/3 range/bilateral upper extremity Active ROM was WFL (within functional limits)/bilateral  lower extremity Active ROM was WFL (within functional limits)

## 2018-10-31 NOTE — PHYSICAL THERAPY INITIAL EVALUATION ADULT - GENERAL OBSERVATIONS, REHAB EVAL
Consult received, chart reviewed. Patient received in bed, NAD, +IV. Patient agreed to EVALUATION from Physical Therapist.

## 2018-11-01 LAB
ANION GAP SERPL CALC-SCNC: 8 MMOL/L — SIGNIFICANT CHANGE UP (ref 5–17)
BUN SERPL-MCNC: 12 MG/DL — SIGNIFICANT CHANGE UP (ref 7–18)
CALCIUM SERPL-MCNC: 9.7 MG/DL — SIGNIFICANT CHANGE UP (ref 8.4–10.5)
CHLORIDE SERPL-SCNC: 99 MMOL/L — SIGNIFICANT CHANGE UP (ref 96–108)
CO2 SERPL-SCNC: 29 MMOL/L — SIGNIFICANT CHANGE UP (ref 22–31)
CREAT SERPL-MCNC: 0.76 MG/DL — SIGNIFICANT CHANGE UP (ref 0.5–1.3)
GLUCOSE BLDC GLUCOMTR-MCNC: 128 MG/DL — HIGH (ref 70–99)
GLUCOSE BLDC GLUCOMTR-MCNC: 134 MG/DL — HIGH (ref 70–99)
GLUCOSE BLDC GLUCOMTR-MCNC: 136 MG/DL — HIGH (ref 70–99)
GLUCOSE BLDC GLUCOMTR-MCNC: 141 MG/DL — HIGH (ref 70–99)
GLUCOSE SERPL-MCNC: 145 MG/DL — HIGH (ref 70–99)
POTASSIUM SERPL-MCNC: 3.2 MMOL/L — LOW (ref 3.5–5.3)
POTASSIUM SERPL-SCNC: 3.2 MMOL/L — LOW (ref 3.5–5.3)
SODIUM SERPL-SCNC: 136 MMOL/L — SIGNIFICANT CHANGE UP (ref 135–145)

## 2018-11-01 RX ORDER — POTASSIUM CHLORIDE 20 MEQ
40 PACKET (EA) ORAL ONCE
Refills: 0 | Status: COMPLETED | OUTPATIENT
Start: 2018-11-01 | End: 2018-11-01

## 2018-11-01 RX ORDER — AMIKACIN SULFATE 250 MG/ML
1000 INJECTION, SOLUTION INTRAMUSCULAR; INTRAVENOUS DAILY
Refills: 0 | Status: COMPLETED | OUTPATIENT
Start: 2018-11-01 | End: 2018-11-03

## 2018-11-01 RX ADMIN — AMLODIPINE BESYLATE 10 MILLIGRAM(S): 2.5 TABLET ORAL at 05:03

## 2018-11-01 RX ADMIN — Medication 1 TABLET(S): at 11:08

## 2018-11-01 RX ADMIN — Medication 1 DROP(S): at 05:04

## 2018-11-01 RX ADMIN — Medication 3 MILLILITER(S): at 14:52

## 2018-11-01 RX ADMIN — LACTULOSE 20 GRAM(S): 10 SOLUTION ORAL at 17:06

## 2018-11-01 RX ADMIN — HEPARIN SODIUM 5000 UNIT(S): 5000 INJECTION INTRAVENOUS; SUBCUTANEOUS at 21:08

## 2018-11-01 RX ADMIN — AMIKACIN SULFATE 104 MILLIGRAM(S): 250 INJECTION, SOLUTION INTRAMUSCULAR; INTRAVENOUS at 11:10

## 2018-11-01 RX ADMIN — Medication 3 MILLILITER(S): at 09:02

## 2018-11-01 RX ADMIN — Medication 1 DROP(S): at 17:07

## 2018-11-01 RX ADMIN — Medication 81 MILLIGRAM(S): at 11:08

## 2018-11-01 RX ADMIN — Medication 3 MILLILITER(S): at 20:14

## 2018-11-01 RX ADMIN — HEPARIN SODIUM 5000 UNIT(S): 5000 INJECTION INTRAVENOUS; SUBCUTANEOUS at 13:26

## 2018-11-01 RX ADMIN — Medication 40 MILLIEQUIVALENT(S): at 18:42

## 2018-11-01 RX ADMIN — HEPARIN SODIUM 5000 UNIT(S): 5000 INJECTION INTRAVENOUS; SUBCUTANEOUS at 05:03

## 2018-11-01 RX ADMIN — BUDESONIDE AND FORMOTEROL FUMARATE DIHYDRATE 2 PUFF(S): 160; 4.5 AEROSOL RESPIRATORY (INHALATION) at 11:09

## 2018-11-01 RX ADMIN — OLANZAPINE 10 MILLIGRAM(S): 15 TABLET, FILM COATED ORAL at 11:08

## 2018-11-01 RX ADMIN — Medication 175 MICROGRAM(S): at 05:03

## 2018-11-01 RX ADMIN — Medication 40 MILLIGRAM(S): at 05:03

## 2018-11-01 RX ADMIN — BUDESONIDE AND FORMOTEROL FUMARATE DIHYDRATE 2 PUFF(S): 160; 4.5 AEROSOL RESPIRATORY (INHALATION) at 21:08

## 2018-11-01 NOTE — DIETITIAN INITIAL EVALUATION ADULT. - FACTORS AFF FOOD INTAKE
but does not want consistency changed/difficulty chewing/persistent lack of appetite/persistent nausea

## 2018-11-01 NOTE — PROGRESS NOTE ADULT - SUBJECTIVE AND OBJECTIVE BOX
PGY 1 Note discussed with supervising resident and primary attending    Patient is a 73y old  Female who presents with a chief complaint of Fever/Sepsis (30 Oct 2018 11:18)      INTERVAL HPI/OVERNIGHT EVENTS: no events noted overnight.    MEDICATIONS  (STANDING):  ALBUTerol/ipratropium for Nebulization 3 milliLiter(s) Nebulizer every 6 hours  amiKACIN  IVPB 1000 milliGRAM(s) IV Intermittent daily  amLODIPine   Tablet 10 milliGRAM(s) Oral daily  artificial tears (preservative free) Ophthalmic Solution 1 Drop(s) Both EYES two times a day  aspirin enteric coated 81 milliGRAM(s) Oral daily  buDESOnide  80 MICROgram(s)/formoterol 4.5 MICROgram(s) Inhaler 2 Puff(s) Inhalation two times a day  furosemide    Tablet 40 milliGRAM(s) Oral daily  heparin  Injectable 5000 Unit(s) SubCutaneous every 8 hours  insulin lispro (HumaLOG) corrective regimen sliding scale   SubCutaneous three times a day before meals  lactulose Syrup 20 Gram(s) Oral two times a day  levothyroxine 175 MICROGram(s) Oral daily  multivitamin 1 Tablet(s) Oral daily  OLANZapine 10 milliGRAM(s) Oral daily    MEDICATIONS  (PRN):  acetaminophen   Tablet .. 650 milliGRAM(s) Oral every 6 hours PRN Temp greater or equal to 38C (100.4F)  oxyCODONE    5 mG/acetaminophen 325 mG 2 Tablet(s) Oral every 8 hours PRN Severe Pain (7 - 10)      __________________________________________________  REVIEW OF SYSTEMS:    CONSTITUTIONAL: No fever,   EYES: no acute visual disturbances  NECK: No pain or stiffness  RESPIRATORY: No cough; No shortness of breath  CARDIOVASCULAR: No chest pain, no palpitations  GASTROINTESTINAL: No pain. No nausea or vomiting; No diarrhea   NEUROLOGICAL: No headache or numbness, no tremors  MUSCULOSKELETAL: No joint pain, no muscle pain  GENITOURINARY: no dysuria, no frequency, no hesitancy  PSYCHIATRY: no depression , no anxiety  ALL OTHER  ROS negative        Vital Signs Last 24 Hrs  T(C): 36.7 (01 Nov 2018 05:06), Max: 36.7 (31 Oct 2018 21:33)  T(F): 98.1 (01 Nov 2018 05:06), Max: 98.1 (31 Oct 2018 21:33)  HR: 72 (01 Nov 2018 05:06) (72 - 95)  BP: 130/67 (01 Nov 2018 05:06) (122/69 - 142/75)  BP(mean): --  RR: 18 (01 Nov 2018 05:06) (18 - 18)  SpO2: 94% (01 Nov 2018 05:06) (94% - 98%)    ________________________________________________  PHYSICAL EXAM:  GENERAL: NAD  HEENT: Normocephalic;  conjunctivae and sclerae clear; moist mucous membranes;   NECK : supple  CHEST/LUNG: Clear to auscultation bilaterally with good air entry   HEART: S1 S2  regular; no murmurs, gallops or rubs  ABDOMEN: Soft, Nontender, Nondistended; Bowel sounds present  EXTREMITIES: no cyanosis; no edema; no calf tenderness  SKIN: warm and dry; no rash  NERVOUS SYSTEM:  Awake and alert; Oriented  to place, person and time ; no new deficits    _________________________________________________  LABS:                        12.3   9.8   )-----------( 264      ( 31 Oct 2018 07:52 )             39.9     10-31    137  |  100  |  8   ----------------------------<  131<H>  3.1<L>   |  28  |  0.59    Ca    9.7      31 Oct 2018 07:52          CAPILLARY BLOOD GLUCOSE      POCT Blood Glucose.: 128 mg/dL (01 Nov 2018 12:04)  POCT Blood Glucose.: 141 mg/dL (01 Nov 2018 08:03)  POCT Blood Glucose.: 146 mg/dL (31 Oct 2018 16:51)        RADIOLOGY & ADDITIONAL TESTS:    Imaging Personally Reviewed:  YES    Consultant(s) Notes Reviewed:   YES    Care Discussed with Consultants : YES     Plan of care was discussed with patient and /or primary care giver; all questions and concerns were addressed and care was aligned with patient's wishes.

## 2018-11-01 NOTE — PROGRESS NOTE ADULT - PROBLEM SELECTOR PLAN 1
Patient is s/p sepsis, now resolved  -Currently not spiking fevers  -Blood cultures:  Carbapenem resistant Pseudomonas	  -C/w amikacin.  -Will monitor kidney function closely  -Patient will need at least 10 days of IV abx. No plan for PICC line.   -Repeat cultures from 10/29 are negative till date  -ID Dr. Aparicio

## 2018-11-01 NOTE — DIETITIAN INITIAL EVALUATION ADULT. - OTHER INFO
Patient reports losing 23% intentionally over past year ( 77 to 59kg), ? accuracy of weight provided by patient as weighs 106kg in-house

## 2018-11-01 NOTE — DIETITIAN INITIAL EVALUATION ADULT. - PERTINENT LABORATORY DATA
10-31 Na137 mmol/L Glu 131 mg/dL<H> K+ 3.1 mmol/L<L> Cr  0.59 mg/dL BUN 8 mg/dL 10-26 Alb 3.2 g/dL<L> 10-27 [id number] 6.5 %<H> 10-27 Chol 113 mg/dL LDL 44 mg/dL HDL 54 mg/dL Trig 77 mg/dL

## 2018-11-01 NOTE — PROGRESS NOTE ADULT - SUBJECTIVE AND OBJECTIVE BOX
Patient seen and examined.     MEDICATIONS  (STANDING):  ALBUTerol/ipratropium for Nebulization 3 milliLiter(s) Nebulizer every 6 hours  amiKACIN  IVPB 1000 milliGRAM(s) IV Intermittent daily  amLODIPine   Tablet 10 milliGRAM(s) Oral daily  artificial tears (preservative free) Ophthalmic Solution 1 Drop(s) Both EYES two times a day  aspirin enteric coated 81 milliGRAM(s) Oral daily  buDESOnide  80 MICROgram(s)/formoterol 4.5 MICROgram(s) Inhaler 2 Puff(s) Inhalation two times a day  furosemide    Tablet 40 milliGRAM(s) Oral daily  heparin  Injectable 5000 Unit(s) SubCutaneous every 8 hours  insulin lispro (HumaLOG) corrective regimen sliding scale   SubCutaneous three times a day before meals  lactulose Syrup 20 Gram(s) Oral two times a day  levothyroxine 175 MICROGram(s) Oral daily  multivitamin 1 Tablet(s) Oral daily  OLANZapine 10 milliGRAM(s) Oral daily      MEDICATIONS  (PRN):  acetaminophen   Tablet .. 650 milliGRAM(s) Oral every 6 hours PRN Temp greater or equal to 38C (100.4F)  oxyCODONE    5 mG/acetaminophen 325 mG 2 Tablet(s) Oral every 8 hours PRN Severe Pain (7 - 10)     Medications up to date at time of exam.    PHYSICAL EXAMINATION:  Patient has no new complaints.  GENERAL: The patient is a well-developed, well-nourished, in no apparent distress.     Vital Signs Last 24 Hrs  T(C): 36.7 (01 Nov 2018 05:06), Max: 36.7 (31 Oct 2018 21:33)  T(F): 98.1 (01 Nov 2018 05:06), Max: 98.1 (31 Oct 2018 21:33)  HR: 72 (01 Nov 2018 05:06) (72 - 95)  BP: 130/67 (01 Nov 2018 05:06) (122/69 - 142/75)  BP(mean): --  RR: 18 (01 Nov 2018 05:06) (18 - 18)  SpO2: 94% (01 Nov 2018 05:06) (94% - 98%)   (if applicable)    Chest Tube (if applicable)    HEENT: Head is normocephalic and atraumatic.     NECK: Supple, no palpable adenopathy.    LUNGS: Clear to auscultation, no wheezing, rales, or rhonchi.    HEART: Regular rate and rhythm without murmur.    ABDOMEN: Soft, nontender, and nondistended.      EXTREMITIES: Without any cyanosis, clubbing, rash, lesions or + B/L LE edema.    NEUROLOGIC: Awake, alert.    SKIN: Warm, dry, good turgor.    LABS:                        12.3   9.8   )-----------( 264      ( 31 Oct 2018 07:52 )             39.9     10-31    137  |  100  |  8   ----------------------------<  131<H>  3.1<L>   |  28  |  0.59    Ca    9.7      31 Oct 2018 07:52          MICROBIOLOGY: (if applicable)    RADIOLOGY & ADDITIONAL STUDIES:  EKG:   CXR:  < from: Xray Chest 1 View-PORTABLE IMMEDIATE (10.26.18 @ 15:55) >    EXAM:  XR CHEST PORTABLE IMMED 1V                            PROCEDURE DATE:  10/26/2018          INTERPRETATION:  CLINICAL STATEMENT: Chest Pain.    TECHNIQUE: AP view of the chest.    COMPARISON: 4/18/2017    FINDINGS/  IMPRESSION:  Increased interstitial lung markings without significant change. No new   consolidation or significant pleural effusion.    Heart size cannot be accurately assessed in this projection.    Chronic deformity both shoulders.                  EUGENIA BUCHANAN M.D., ATTENDING RADIOLOGIST  This document has been electronically signed. Oct 26 2018  3:59PM                < end of copied text >    ECHO:    IMPRESSION: 73y Female PAST MEDICAL & SURGICAL HISTORY:  Interstitial lung disease  Chronic diastolic congestive heart failure  Chronic obstructive pulmonary disease, unspecified COPD type  Hypercholesteremia  Arthritis  Hypothyroidism  Bipolar 1 disorder  Schizo affective schizophrenia  HTN (hypertension)  No significant past surgical history       Impression; 72 Y/O Female with prior mentioned multiple chronic conditions . Presented with 104 at NH  with Tachycardia 167 and altered mental status and confusion. Code Sepsis was called in ED. Has COPD with on and off hypoxia. On Medicine Unit. RVP negative.       +pseudomonas bacteremia    Suggestion;  Continue oxygen supplementation prn to keep O2 saturation >90%.   Continue DuoNeb Q 6 hours. Budesonide twice daily.  DVT/ GI prophylactic.   Continue antibiotic as per ID recommendation.    Aspiration precautions. Patient seen and examined.     MEDICATIONS  (STANDING):  ALBUTerol/ipratropium for Nebulization 3 milliLiter(s) Nebulizer every 6 hours  amiKACIN  IVPB 1000 milliGRAM(s) IV Intermittent daily  amLODIPine   Tablet 10 milliGRAM(s) Oral daily  artificial tears (preservative free) Ophthalmic Solution 1 Drop(s) Both EYES two times a day  aspirin enteric coated 81 milliGRAM(s) Oral daily  buDESOnide  80 MICROgram(s)/formoterol 4.5 MICROgram(s) Inhaler 2 Puff(s) Inhalation two times a day  furosemide    Tablet 40 milliGRAM(s) Oral daily  heparin  Injectable 5000 Unit(s) SubCutaneous every 8 hours  insulin lispro (HumaLOG) corrective regimen sliding scale   SubCutaneous three times a day before meals  lactulose Syrup 20 Gram(s) Oral two times a day  levothyroxine 175 MICROGram(s) Oral daily  multivitamin 1 Tablet(s) Oral daily  OLANZapine 10 milliGRAM(s) Oral daily      MEDICATIONS  (PRN):  acetaminophen   Tablet .. 650 milliGRAM(s) Oral every 6 hours PRN Temp greater or equal to 38C (100.4F)  oxyCODONE    5 mG/acetaminophen 325 mG 2 Tablet(s) Oral every 8 hours PRN Severe Pain (7 - 10)     Medications up to date at time of exam.    PHYSICAL EXAMINATION:  Patient has no new complaints.  GENERAL: The patient is a well-developed, well-nourished, in no apparent distress.     Vital Signs Last 24 Hrs  T(C): 36.7 (01 Nov 2018 05:06), Max: 36.7 (31 Oct 2018 21:33)  T(F): 98.1 (01 Nov 2018 05:06), Max: 98.1 (31 Oct 2018 21:33)  HR: 72 (01 Nov 2018 05:06) (72 - 95)  BP: 130/67 (01 Nov 2018 05:06) (122/69 - 142/75)  BP(mean): --  RR: 18 (01 Nov 2018 05:06) (18 - 18)  SpO2: 94% (01 Nov 2018 05:06) (94% - 98%)   (if applicable)    Chest Tube (if applicable)    HEENT: Head is normocephalic and atraumatic.     NECK: Supple, no palpable adenopathy.    LUNGS: Clear to auscultation, no wheezing, rales, or rhonchi.    HEART: Regular rate and rhythm without murmur.    ABDOMEN: Soft, nontender, and nondistended.      EXTREMITIES: Without any cyanosis, clubbing, rash, lesions or + B/L LE edema.    NEUROLOGIC: Awake, alert.    SKIN: Warm, dry, good turgor.    LABS:                        12.3   9.8   )-----------( 264      ( 31 Oct 2018 07:52 )             39.9     10-31    137  |  100  |  8   ----------------------------<  131<H>  3.1<L>   |  28  |  0.59    Ca    9.7      31 Oct 2018 07:52          MICROBIOLOGY: (if applicable)    RADIOLOGY & ADDITIONAL STUDIES:  EKG:   CXR:  < from: Xray Chest 1 View-PORTABLE IMMEDIATE (10.26.18 @ 15:55) >    EXAM:  XR CHEST PORTABLE IMMED 1V                            PROCEDURE DATE:  10/26/2018          INTERPRETATION:  CLINICAL STATEMENT: Chest Pain.    TECHNIQUE: AP view of the chest.    COMPARISON: 4/18/2017    FINDINGS/  IMPRESSION:  Increased interstitial lung markings without significant change. No new   consolidation or significant pleural effusion.    Heart size cannot be accurately assessed in this projection.    Chronic deformity both shoulders.                  EUGENIA BUCHANAN M.D., ATTENDING RADIOLOGIST  This document has been electronically signed. Oct 26 2018  3:59PM                < end of copied text >    ECHO:    IMPRESSION: 73y Female PAST MEDICAL & SURGICAL HISTORY:  Interstitial lung disease  Chronic diastolic congestive heart failure  Chronic obstructive pulmonary disease, unspecified COPD type  Hypercholesteremia  Arthritis  Hypothyroidism  Bipolar 1 disorder  Schizo affective schizophrenia  HTN (hypertension)  No significant past surgical history       Impression; 74 Y/O Female with prior mentioned multiple chronic conditions . Presented with 104 at NH  with Tachycardia 167 and altered mental status and confusion. Code Sepsis was called in ED. Has COPD with on and off hypoxia. On Medicine Unit. RVP negative.       +pseudomonas bacteremia    Suggestion;  Continue oxygen supplementation prn to keep O2 saturation >90%.   Continue DuoNeb Q 6 hours. Budesonide twice daily.  DVT/ GI prophylactic.   Continue antibiotic as per ID recommendation.    Aspiration precautions.       Agree with above assessment and plan as transcribed.

## 2018-11-02 LAB
ANION GAP SERPL CALC-SCNC: 7 MMOL/L — SIGNIFICANT CHANGE UP (ref 5–17)
BUN SERPL-MCNC: 11 MG/DL — SIGNIFICANT CHANGE UP (ref 7–18)
CALCIUM SERPL-MCNC: 9.3 MG/DL — SIGNIFICANT CHANGE UP (ref 8.4–10.5)
CHLORIDE SERPL-SCNC: 97 MMOL/L — SIGNIFICANT CHANGE UP (ref 96–108)
CO2 SERPL-SCNC: 29 MMOL/L — SIGNIFICANT CHANGE UP (ref 22–31)
CREAT SERPL-MCNC: 0.73 MG/DL — SIGNIFICANT CHANGE UP (ref 0.5–1.3)
GLUCOSE BLDC GLUCOMTR-MCNC: 128 MG/DL — HIGH (ref 70–99)
GLUCOSE BLDC GLUCOMTR-MCNC: 141 MG/DL — HIGH (ref 70–99)
GLUCOSE BLDC GLUCOMTR-MCNC: 146 MG/DL — HIGH (ref 70–99)
GLUCOSE SERPL-MCNC: 127 MG/DL — HIGH (ref 70–99)
HCT VFR BLD CALC: 38.3 % — SIGNIFICANT CHANGE UP (ref 34.5–45)
HGB BLD-MCNC: 11.8 G/DL — SIGNIFICANT CHANGE UP (ref 11.5–15.5)
MCHC RBC-ENTMCNC: 23.7 PG — LOW (ref 27–34)
MCHC RBC-ENTMCNC: 30.9 GM/DL — LOW (ref 32–36)
MCV RBC AUTO: 76.6 FL — LOW (ref 80–100)
PLATELET # BLD AUTO: 318 K/UL — SIGNIFICANT CHANGE UP (ref 150–400)
POTASSIUM SERPL-MCNC: 3.4 MMOL/L — LOW (ref 3.5–5.3)
POTASSIUM SERPL-SCNC: 3.4 MMOL/L — LOW (ref 3.5–5.3)
RBC # BLD: 5 M/UL — SIGNIFICANT CHANGE UP (ref 3.8–5.2)
RBC # FLD: 17.2 % — HIGH (ref 10.3–14.5)
SODIUM SERPL-SCNC: 133 MMOL/L — LOW (ref 135–145)
WBC # BLD: 10.7 K/UL — HIGH (ref 3.8–10.5)
WBC # FLD AUTO: 10.7 K/UL — HIGH (ref 3.8–10.5)

## 2018-11-02 RX ORDER — POTASSIUM CHLORIDE 20 MEQ
40 PACKET (EA) ORAL ONCE
Refills: 0 | Status: COMPLETED | OUTPATIENT
Start: 2018-11-02 | End: 2018-11-02

## 2018-11-02 RX ADMIN — OLANZAPINE 10 MILLIGRAM(S): 15 TABLET, FILM COATED ORAL at 11:19

## 2018-11-02 RX ADMIN — HEPARIN SODIUM 5000 UNIT(S): 5000 INJECTION INTRAVENOUS; SUBCUTANEOUS at 13:11

## 2018-11-02 RX ADMIN — AMLODIPINE BESYLATE 10 MILLIGRAM(S): 2.5 TABLET ORAL at 05:41

## 2018-11-02 RX ADMIN — Medication 40 MILLIEQUIVALENT(S): at 11:15

## 2018-11-02 RX ADMIN — BUDESONIDE AND FORMOTEROL FUMARATE DIHYDRATE 2 PUFF(S): 160; 4.5 AEROSOL RESPIRATORY (INHALATION) at 21:06

## 2018-11-02 RX ADMIN — Medication 3 MILLILITER(S): at 14:58

## 2018-11-02 RX ADMIN — Medication 40 MILLIGRAM(S): at 05:41

## 2018-11-02 RX ADMIN — Medication 1 TABLET(S): at 11:19

## 2018-11-02 RX ADMIN — Medication 81 MILLIGRAM(S): at 11:22

## 2018-11-02 RX ADMIN — Medication 3 MILLILITER(S): at 20:44

## 2018-11-02 RX ADMIN — AMIKACIN SULFATE 104 MILLIGRAM(S): 250 INJECTION, SOLUTION INTRAMUSCULAR; INTRAVENOUS at 12:07

## 2018-11-02 RX ADMIN — Medication 3 MILLILITER(S): at 09:16

## 2018-11-02 RX ADMIN — HEPARIN SODIUM 5000 UNIT(S): 5000 INJECTION INTRAVENOUS; SUBCUTANEOUS at 21:06

## 2018-11-02 RX ADMIN — HEPARIN SODIUM 5000 UNIT(S): 5000 INJECTION INTRAVENOUS; SUBCUTANEOUS at 05:41

## 2018-11-02 RX ADMIN — Medication 175 MICROGRAM(S): at 05:41

## 2018-11-02 NOTE — PROGRESS NOTE ADULT - SUBJECTIVE AND OBJECTIVE BOX
Time of Visit:  Patient seen and examined.  sitting in bed    MEDICATIONS  (STANDING):  ALBUTerol/ipratropium for Nebulization 3 milliLiter(s) Nebulizer every 6 hours  amiKACIN  IVPB 1000 milliGRAM(s) IV Intermittent daily  amLODIPine   Tablet 10 milliGRAM(s) Oral daily  artificial tears (preservative free) Ophthalmic Solution 1 Drop(s) Both EYES two times a day  aspirin enteric coated 81 milliGRAM(s) Oral daily  buDESOnide  80 MICROgram(s)/formoterol 4.5 MICROgram(s) Inhaler 2 Puff(s) Inhalation two times a day  furosemide    Tablet 40 milliGRAM(s) Oral daily  heparin  Injectable 5000 Unit(s) SubCutaneous every 8 hours  insulin lispro (HumaLOG) corrective regimen sliding scale   SubCutaneous three times a day before meals  lactulose Syrup 20 Gram(s) Oral two times a day  levothyroxine 175 MICROGram(s) Oral daily  multivitamin 1 Tablet(s) Oral daily  OLANZapine 10 milliGRAM(s) Oral daily      MEDICATIONS  (PRN):  acetaminophen   Tablet .. 650 milliGRAM(s) Oral every 6 hours PRN Temp greater or equal to 38C (100.4F)  oxyCODONE    5 mG/acetaminophen 325 mG 2 Tablet(s) Oral every 8 hours PRN Severe Pain (7 - 10)       Medications up to date at time of exam.      PHYSICAL EXAMINATION:  Patient has no new complaints.  GENERAL: The patient is a well-developed, well-nourished, in no apparent distress.     Vital Signs Last 24 Hrs  T(C): 36.8 (02 Nov 2018 14:08), Max: 37 (02 Nov 2018 04:49)  T(F): 98.3 (02 Nov 2018 14:08), Max: 98.6 (02 Nov 2018 04:49)  HR: 96 (02 Nov 2018 14:08) (93 - 98)  BP: 110/63 (02 Nov 2018 14:08) (110/63 - 142/75)  BP(mean): --  RR: 18 (02 Nov 2018 14:08) (18 - 18)  SpO2: 96% (02 Nov 2018 14:08) (94% - 96%)   (if applicable)    Chest Tube (if applicable)    HEENT: Head is normocephalic and atraumatic. Extraocular muscles are intact. Mucous membranes are moist.     NECK: Supple, no palpable adenopathy.    LUNGS: Clear to auscultation, no wheezing, rales, or rhonchi.    HEART: Regular rate and rhythm without murmur.    ABDOMEN: Soft, nontender, and nondistended.  No hepatosplenomegaly is noted.    : No painful voiding, no pelvic pain    EXTREMITIES: 1+ B/L lower ext edema.    NEUROLOGIC: Awake, alert, oriented, grossly intact    SKIN: Warm, dry, good turgor.      LABS:                        11.8   10.7  )-----------( 318      ( 02 Nov 2018 07:22 )             38.3     11-02    133<L>  |  97  |  11  ----------------------------<  127<H>  3.4<L>   |  29  |  0.73    Ca    9.3      02 Nov 2018 07:22                          MICROBIOLOGY: (if applicable)    RADIOLOGY & ADDITIONAL STUDIES:  EKG:   CXR:  ECHO:    IMPRESSION: 73y Female PAST MEDICAL & SURGICAL HISTORY:  Interstitial lung disease  Chronic diastolic congestive heart failure  Chronic obstructive pulmonary disease, unspecified COPD type  Hypercholesteremia  Arthritis  Hypothyroidism  Bipolar 1 disorder  Schizo affective schizophrenia  HTN (hypertension)  No significant past surgical history   p/w         Impression; 74 Y/O Female with prior mentioned multiple chronic conditions . Presented with 104 at NH  with Tachycardia 167 and altered mental status and confusion. Code Sepsis was called in ED. Has COPD with on and off hypoxia. On Medicine Unit. RVP negative.       +pseudomonas bacteremia    Suggestion;  Continue oxygen supplementation prn to keep O2 saturation >90%.   Continue DuoNeb Q 6 hours. Budesonide twice daily.  DVT/ GI prophylactic.   Continue antibiotic as per ID recommendation.    Aspiration precautions.

## 2018-11-02 NOTE — PROGRESS NOTE ADULT - PROBLEM SELECTOR PLAN 1
Patient is s/p sepsis, now resolved  -Currently not spiking fevers  -Blood cultures:  Carbapenem resistant Pseudomonas	  -C/w amikacin. 5th day   -Will monitor kidney function closely  -Patient will need at least 10 days of IV abx. No plan for PICC line.   -Repeat cultures from 10/29 are negative till date  -PT recommended [facility]  -ID Dr. Aparicio

## 2018-11-02 NOTE — PROGRESS NOTE ADULT - SUBJECTIVE AND OBJECTIVE BOX
PGY 1 Note discussed with supervising resident and primary attending    Patient is a 73y old  Female who presents with a chief complaint of SOB (01 Nov 2018 14:31)      INTERVAL HPI/OVERNIGHT EVENTS: no events noted overnight.    MEDICATIONS  (STANDING):  ALBUTerol/ipratropium for Nebulization 3 milliLiter(s) Nebulizer every 6 hours  amiKACIN  IVPB 1000 milliGRAM(s) IV Intermittent daily  amLODIPine   Tablet 10 milliGRAM(s) Oral daily  artificial tears (preservative free) Ophthalmic Solution 1 Drop(s) Both EYES two times a day  aspirin enteric coated 81 milliGRAM(s) Oral daily  buDESOnide  80 MICROgram(s)/formoterol 4.5 MICROgram(s) Inhaler 2 Puff(s) Inhalation two times a day  furosemide    Tablet 40 milliGRAM(s) Oral daily  heparin  Injectable 5000 Unit(s) SubCutaneous every 8 hours  insulin lispro (HumaLOG) corrective regimen sliding scale   SubCutaneous three times a day before meals  lactulose Syrup 20 Gram(s) Oral two times a day  levothyroxine 175 MICROGram(s) Oral daily  multivitamin 1 Tablet(s) Oral daily  OLANZapine 10 milliGRAM(s) Oral daily    MEDICATIONS  (PRN):  acetaminophen   Tablet .. 650 milliGRAM(s) Oral every 6 hours PRN Temp greater or equal to 38C (100.4F)  oxyCODONE    5 mG/acetaminophen 325 mG 2 Tablet(s) Oral every 8 hours PRN Severe Pain (7 - 10)      __________________________________________________  REVIEW OF SYSTEMS:    CONSTITUTIONAL: No fever,   EYES: no acute visual disturbances  NECK: No pain or stiffness  RESPIRATORY: No cough; No shortness of breath  CARDIOVASCULAR: No chest pain, no palpitations  GASTROINTESTINAL: No pain. No nausea or vomiting; No diarrhea   NEUROLOGICAL: No headache or numbness, no tremors  MUSCULOSKELETAL: No joint pain, no muscle pain  GENITOURINARY: no dysuria, no frequency, no hesitancy  PSYCHIATRY: no depression , no anxiety  ALL OTHER  ROS negative        Vital Signs Last 24 Hrs  T(C): 37 (02 Nov 2018 04:49), Max: 37 (02 Nov 2018 04:49)  T(F): 98.6 (02 Nov 2018 04:49), Max: 98.6 (02 Nov 2018 04:49)  HR: 93 (02 Nov 2018 04:49) (93 - 98)  BP: 142/75 (02 Nov 2018 04:49) (135/77 - 142/75)  BP(mean): --  RR: 18 (02 Nov 2018 04:49) (17 - 18)  SpO2: 95% (02 Nov 2018 04:49) (94% - 95%)    ________________________________________________  PHYSICAL EXAM:  GENERAL: NAD  HEENT: Normocephalic;  conjunctivae and sclerae clear; moist mucous membranes;   NECK : supple  CHEST/LUNG: Clear to auscultation bilaterally with good air entry   HEART: S1 S2  regular; no murmurs, gallops or rubs  ABDOMEN: Soft, Nontender, Nondistended; Bowel sounds present  EXTREMITIES: no cyanosis; no edema; no calf tenderness  SKIN: warm and dry; no rash  NERVOUS SYSTEM:  Awake and alert; Oriented  to place, person and time ; no new deficits    _________________________________________________  LABS:                        12.3   9.8   )-----------( 264      ( 31 Oct 2018 07:52 )             39.9     11-01    136  |  99  |  12  ----------------------------<  145<H>  3.2<L>   |  29  |  0.76    Ca    9.7      01 Nov 2018 14:06      LABS FROM TODAY TO FOLLOW     CAPILLARY BLOOD GLUCOSE      POCT Blood Glucose.: 136 mg/dL (01 Nov 2018 21:34)  POCT Blood Glucose.: 134 mg/dL (01 Nov 2018 16:54)  POCT Blood Glucose.: 128 mg/dL (01 Nov 2018 12:04)  POCT Blood Glucose.: 141 mg/dL (01 Nov 2018 08:03)        RADIOLOGY & ADDITIONAL TESTS:    Imaging Personally Reviewed:  YES    Consultant(s) Notes Reviewed:   YES    Care Discussed with Consultants : YES     Plan of care was discussed with patient and /or primary care giver; all questions and concerns were addressed and care was aligned with patient's wishes.

## 2018-11-03 LAB
ANION GAP SERPL CALC-SCNC: 7 MMOL/L — SIGNIFICANT CHANGE UP (ref 5–17)
BUN SERPL-MCNC: 11 MG/DL — SIGNIFICANT CHANGE UP (ref 7–18)
CALCIUM SERPL-MCNC: 9.1 MG/DL — SIGNIFICANT CHANGE UP (ref 8.4–10.5)
CHLORIDE SERPL-SCNC: 100 MMOL/L — SIGNIFICANT CHANGE UP (ref 96–108)
CO2 SERPL-SCNC: 27 MMOL/L — SIGNIFICANT CHANGE UP (ref 22–31)
CREAT SERPL-MCNC: 0.67 MG/DL — SIGNIFICANT CHANGE UP (ref 0.5–1.3)
CULTURE RESULTS: SIGNIFICANT CHANGE UP
GLUCOSE BLDC GLUCOMTR-MCNC: 141 MG/DL — HIGH (ref 70–99)
GLUCOSE BLDC GLUCOMTR-MCNC: 148 MG/DL — HIGH (ref 70–99)
GLUCOSE SERPL-MCNC: 142 MG/DL — HIGH (ref 70–99)
HCT VFR BLD CALC: 37.6 % — SIGNIFICANT CHANGE UP (ref 34.5–45)
HGB BLD-MCNC: 11.7 G/DL — SIGNIFICANT CHANGE UP (ref 11.5–15.5)
MCHC RBC-ENTMCNC: 24 PG — LOW (ref 27–34)
MCHC RBC-ENTMCNC: 31.2 GM/DL — LOW (ref 32–36)
MCV RBC AUTO: 77 FL — LOW (ref 80–100)
PLATELET # BLD AUTO: 318 K/UL — SIGNIFICANT CHANGE UP (ref 150–400)
POTASSIUM SERPL-MCNC: 3.7 MMOL/L — SIGNIFICANT CHANGE UP (ref 3.5–5.3)
POTASSIUM SERPL-SCNC: 3.7 MMOL/L — SIGNIFICANT CHANGE UP (ref 3.5–5.3)
RBC # BLD: 4.89 M/UL — SIGNIFICANT CHANGE UP (ref 3.8–5.2)
RBC # FLD: 17.1 % — HIGH (ref 10.3–14.5)
SODIUM SERPL-SCNC: 134 MMOL/L — LOW (ref 135–145)
SPECIMEN SOURCE: SIGNIFICANT CHANGE UP
WBC # BLD: 9 K/UL — SIGNIFICANT CHANGE UP (ref 3.8–10.5)
WBC # FLD AUTO: 9 K/UL — SIGNIFICANT CHANGE UP (ref 3.8–10.5)

## 2018-11-03 RX ORDER — TIOTROPIUM BROMIDE 18 UG/1
1 CAPSULE ORAL; RESPIRATORY (INHALATION) DAILY
Refills: 0 | Status: DISCONTINUED | OUTPATIENT
Start: 2018-11-03 | End: 2018-11-08

## 2018-11-03 RX ORDER — AMIKACIN SULFATE 250 MG/ML
1000 INJECTION, SOLUTION INTRAMUSCULAR; INTRAVENOUS DAILY
Refills: 0 | Status: COMPLETED | OUTPATIENT
Start: 2018-11-03 | End: 2018-11-07

## 2018-11-03 RX ORDER — BUDESONIDE AND FORMOTEROL FUMARATE DIHYDRATE 160; 4.5 UG/1; UG/1
2 AEROSOL RESPIRATORY (INHALATION) EVERY 12 HOURS
Refills: 0 | Status: DISCONTINUED | OUTPATIENT
Start: 2018-11-03 | End: 2018-11-08

## 2018-11-03 RX ADMIN — HEPARIN SODIUM 5000 UNIT(S): 5000 INJECTION INTRAVENOUS; SUBCUTANEOUS at 05:51

## 2018-11-03 RX ADMIN — Medication 175 MICROGRAM(S): at 05:51

## 2018-11-03 RX ADMIN — HEPARIN SODIUM 5000 UNIT(S): 5000 INJECTION INTRAVENOUS; SUBCUTANEOUS at 21:09

## 2018-11-03 RX ADMIN — Medication 3 MILLILITER(S): at 21:01

## 2018-11-03 RX ADMIN — BUDESONIDE AND FORMOTEROL FUMARATE DIHYDRATE 2 PUFF(S): 160; 4.5 AEROSOL RESPIRATORY (INHALATION) at 21:08

## 2018-11-03 RX ADMIN — AMIKACIN SULFATE 104 MILLIGRAM(S): 250 INJECTION, SOLUTION INTRAMUSCULAR; INTRAVENOUS at 11:14

## 2018-11-03 RX ADMIN — BUDESONIDE AND FORMOTEROL FUMARATE DIHYDRATE 2 PUFF(S): 160; 4.5 AEROSOL RESPIRATORY (INHALATION) at 10:48

## 2018-11-03 RX ADMIN — Medication 81 MILLIGRAM(S): at 11:13

## 2018-11-03 RX ADMIN — Medication 1 TABLET(S): at 11:14

## 2018-11-03 RX ADMIN — OLANZAPINE 10 MILLIGRAM(S): 15 TABLET, FILM COATED ORAL at 11:13

## 2018-11-03 RX ADMIN — Medication 3 MILLILITER(S): at 15:24

## 2018-11-03 RX ADMIN — Medication 40 MILLIGRAM(S): at 05:51

## 2018-11-03 RX ADMIN — HEPARIN SODIUM 5000 UNIT(S): 5000 INJECTION INTRAVENOUS; SUBCUTANEOUS at 13:10

## 2018-11-03 RX ADMIN — AMLODIPINE BESYLATE 10 MILLIGRAM(S): 2.5 TABLET ORAL at 05:51

## 2018-11-03 RX ADMIN — Medication 3 MILLILITER(S): at 09:27

## 2018-11-03 NOTE — PROGRESS NOTE ADULT - SUBJECTIVE AND OBJECTIVE BOX
Patient is a 73y old  Female who presents with a chief complaint of COPD (03 Nov 2018 17:59)    PATIENT IS SEEN AND EXAMINED IN MEDICAL FLOOR.    ALLERGIES:  Avelox (Unknown)  moxifloxacin (Unknown)    VITALS:    Vital Signs Last 24 Hrs  T(C): 36.7 (03 Nov 2018 14:44), Max: 36.7 (03 Nov 2018 14:44)  T(F): 98 (03 Nov 2018 14:44), Max: 98 (03 Nov 2018 14:44)  HR: 98 (03 Nov 2018 14:44) (94 - 98)  BP: 111/65 (03 Nov 2018 14:44) (111/65 - 129/77)  BP(mean): --  RR: 17 (03 Nov 2018 14:44) (17 - 18)  SpO2: 98% (03 Nov 2018 14:44) (95% - 98%)    LABS:  CBC Full  -  ( 03 Nov 2018 06:08 )  WBC Count : 9.0 K/uL  Hemoglobin : 11.7 g/dL  Hematocrit : 37.6 %  Platelet Count - Automated : 318 K/uL  Mean Cell Volume : 77.0 fl  Mean Cell Hemoglobin : 24.0 pg  Mean Cell Hemoglobin Concentration : 31.2 gm/dL  Auto Neutrophil # : x  Auto Lymphocyte # : x  Auto Monocyte # : x  Auto Eosinophil # : x  Auto Basophil # : x  Auto Neutrophil % : x  Auto Lymphocyte % : x  Auto Monocyte % : x  Auto Eosinophil % : x  Auto Basophil % : x      11-03    134<L>  |  100  |  11  ----------------------------<  142<H>  3.7   |  27  |  0.67    Ca    9.1      03 Nov 2018 06:08      CAPILLARY BLOOD GLUCOSE    POCT Blood Glucose.: 141 mg/dL (03 Nov 2018 12:02)  POCT Blood Glucose.: 148 mg/dL (03 Nov 2018 07:51)    .Blood Blood-Peripheral  10-30 @ 15:16   No growth to date.  --  --      .Blood Blood-Peripheral  10-29 @ 23:11   No growth to date.  --  --      .Blood Blood-Peripheral  10-29 @ 18:15   No growth at 5 days.  --  --      .Blood Blood-Peripheral  10-27 @ 00:14   Growth in aerobic bottle: Pseudomonas aeruginosa (Carbapenem Resistant)  "Due to technical problems, Proteus sp. will Not be reported as part of  the BCID panel until further notice"  ***Blood Panel PCR results on this specimen are available  approximately 3 hours after the Gram stain result.***  Gram stain, PCR, and/or culture results may not always  correspond due to difference in methodologies.  ************************************************************  This PCR assay was performed using [employer].  The following targets are tested for: Enterococcus,  vancomycin resistant enterococci, Listeria monocytogenes,  coagulase negative staphylococci, S. aureus,  methicillin resistant S. aureus, Streptococcus agalactiae  (Group B), S. pneumoniae, S. pyogenes (Group A),  Acinetobacter baumannii, Enterobacter cloacae, E. coli,  Klebsiella oxytoca, K. pneumoniae, Proteus sp.,  Serratia marcescens, Haemophilus influenzae,  Neisseria meningitidis, Pseudomonas aeruginosa, Candida  albicans, C. glabrata, C krusei, C parapsilosis,  C. tropicalis and the KPC resistance gene.  --  Pseudomonas aeruginosa (Carbapenem Resistant)  Blood Culture PCR      .Urine Clean Catch (Midstream)  10-26 @ 23:47   >100,000 CFU/ml Klebsiella pneumoniae  --  Klebsiella pneumoniae          MEDICATIONS:    MEDICATIONS  (STANDING):  ALBUTerol/ipratropium for Nebulization 3 milliLiter(s) Nebulizer every 6 hours  amiKACIN  IVPB 1000 milliGRAM(s) IV Intermittent daily  amLODIPine   Tablet 10 milliGRAM(s) Oral daily  artificial tears (preservative free) Ophthalmic Solution 1 Drop(s) Both EYES two times a day  aspirin enteric coated 81 milliGRAM(s) Oral daily  buDESOnide  80 MICROgram(s)/formoterol 4.5 MICROgram(s) Inhaler 2 Puff(s) Inhalation two times a day  buDESOnide 160 MICROgram(s)/formoterol 4.5 MICROgram(s) Inhaler 2 Puff(s) Inhalation every 12 hours  furosemide    Tablet 40 milliGRAM(s) Oral daily  heparin  Injectable 5000 Unit(s) SubCutaneous every 8 hours  insulin lispro (HumaLOG) corrective regimen sliding scale   SubCutaneous three times a day before meals  lactulose Syrup 20 Gram(s) Oral two times a day  levothyroxine 175 MICROGram(s) Oral daily  multivitamin 1 Tablet(s) Oral daily  OLANZapine 10 milliGRAM(s) Oral daily  tiotropium 18 MICROgram(s) Capsule 1 Capsule(s) Inhalation daily      MEDICATIONS  (PRN):  acetaminophen   Tablet .. 650 milliGRAM(s) Oral every 6 hours PRN Temp greater or equal to 38C (100.4F)      REVIEW OF SYSTEMS:                           ALL ROS DONE [ X   ]    CONSTITUTIONAL:  LETHARGIC [   ], FEVER [   ], UNRESPONSIVE [   ]  CVS:  CP  [   ], SOB, [   ], PALPITATIONS [   ], DIZZYNESS [   ]  RS: COUGH [   ], SPUTUM [   ]  GI: ABDOMINAL PAIN [   ], NAUSEA [   ], VOMITINGS [   ], DIARRHEA [   ], CONSTIPATION [   ]  :  DYSURIA [   ], NOCTURIA [   ], INCREASED FREQUENCY [   ], DRIBLING [   ],  SKELETAL: PAINFUL JOINTS [   ], SWOLLEN JOINTS [   ], NECK ACHE [   ], LOW BACK ACHE [   ],  SKIN : ULCERS [   ], RASH [   ], ITCHING [   ]  CNS: HEAD ACHE [   ], DOUBLE VISION [   ], BLURRED VISION [   ], AMS / CONFUSION [   ], SEIZURES [   ], WEAKNESS [   ],TINGLING / NUMBNESS [   ]    PHYSICAL EXAMINATION:  GENERAL APPEARANCE: NO DISTRESS  HEENT:  NO PALLOR, NO  JVD,  NO   NODES, NECK SUPPLE  CVS: S1 +, S2 +,   RS: AEEB,  OCCASIONAL  RALES +,   NO RONCHI  ABD: SOFT, NT, NO, BS +  EXT:  PE +,   SKIN: WARM, ERYTHEMA OF B/L LEGS +  SKELETAL:  ROM ACCEPTABLE  CNS:  AAO X1-2  , NO   DEFICITS    RADIOLOGY :    < from: Xray Chest 1 View-PORTABLE IMMEDIATE (10.26.18 @ 15:55) >  IMPRESSION:  Increased interstitial lung markings without significant change. No new   consolidation or significant pleural effusion.    Heart size cannot be accurately assessed in this projection.    Chronic deformity both shoulders.    < end of copied text >        ASSESSMENT :     Sepsis  Interstitial lung disease  Chronic diastolic congestive heart failure  Chronic obstructive pulmonary disease, unspecified COPD type  Hypercholesteremia  Arthritis  Hypothyroidism  Bipolar 1 disorder  Schizo affective schizophrenia  HTN (hypertension)      PLAN:  HPI:  Patient is 72yo F with PMH of HTN, diastolic CHf, HLD, Type 2 DM, COPD/emphysema, cor pulmonale, hypothyroidism, venous insufficiency, bipolar, schizoaffective disorder, GERD, allergic rhinitis, dry eyes, brought in by NH due to  fever of 104F axillary at NH, given cold compresses and Motrin, with continued chills. At NH, patient also noted to be tachycardic to 167bpm with altered mental status and confusion, O2 sat of 87% on room air, with nebulizer treatment and O2 via nasal cannula given. In the ED. patient was CODE SEPSIS due to high fever of 103, tachycardia, and elevated lactate. When examined by me, patient denies all complaints, however able to recall the story as to why she is here. Patient otherwise denies cough, nasal congestion, chest pain, shortness of breath, burning with urination, all other ROS negative. (26 Oct 2018 19:48)    - SEPSIS, PSEUDOMONAS BACTEREMIA ( RPT BLOOD CXS ARE NGTD ), UTI ( KLEBSIELLA ), CELLULITIS OF B/L LEGS ON IV AMIKACIN FOR TOTAL 10 DAYS, WILL COMPLETE BY 11-07-18  - DC PLAN BACK TO [facility] ON 11-07-18  - GI AND DVT PROPHYLAXIS  - DR. PARKS

## 2018-11-03 NOTE — PROGRESS NOTE ADULT - SUBJECTIVE AND OBJECTIVE BOX
Time of Visit:  Patient seen and examined. sitting in chair comfortable    MEDICATIONS  (STANDING):  ALBUTerol/ipratropium for Nebulization 3 milliLiter(s) Nebulizer every 6 hours  amLODIPine   Tablet 10 milliGRAM(s) Oral daily  artificial tears (preservative free) Ophthalmic Solution 1 Drop(s) Both EYES two times a day  aspirin enteric coated 81 milliGRAM(s) Oral daily  buDESOnide  80 MICROgram(s)/formoterol 4.5 MICROgram(s) Inhaler 2 Puff(s) Inhalation two times a day  furosemide    Tablet 40 milliGRAM(s) Oral daily  heparin  Injectable 5000 Unit(s) SubCutaneous every 8 hours  insulin lispro (HumaLOG) corrective regimen sliding scale   SubCutaneous three times a day before meals  lactulose Syrup 20 Gram(s) Oral two times a day  levothyroxine 175 MICROGram(s) Oral daily  multivitamin 1 Tablet(s) Oral daily  OLANZapine 10 milliGRAM(s) Oral daily      MEDICATIONS  (PRN):  acetaminophen   Tablet .. 650 milliGRAM(s) Oral every 6 hours PRN Temp greater or equal to 38C (100.4F)       Medications up to date at time of exam.      PHYSICAL EXAMINATION:  Patient has no new complaints.  GENERAL: The patient is a well-developed, well-nourished, in no apparent distress.     Vital Signs Last 24 Hrs  T(C): 36.7 (03 Nov 2018 14:44), Max: 36.7 (02 Nov 2018 20:59)  T(F): 98 (03 Nov 2018 14:44), Max: 98.1 (02 Nov 2018 20:59)  HR: 98 (03 Nov 2018 14:44) (94 - 98)  BP: 111/65 (03 Nov 2018 14:44) (111/65 - 133/87)  BP(mean): --  RR: 17 (03 Nov 2018 14:44) (17 - 18)  SpO2: 98% (03 Nov 2018 14:44) (95% - 98%)   (if applicable)    Chest Tube (if applicable)    HEENT: Head is normocephalic and atraumatic. Extraocular muscles are intact. Mucous membranes are moist.     NECK: Supple, no palpable adenopathy.    LUNGS: Clear to auscultation, no wheezing, rales, or rhonchi.    HEART: Regular rate and rhythm without murmur.    ABDOMEN: Soft, nontender, and nondistended.  No hepatosplenomegaly is noted.    : No painful voiding, no pelvic pain    EXTREMITIES: decrease b/l lower ext  edema.    NEUROLOGIC: Awake, alert, oriented, grossly intact    SKIN: Warm, dry, good turgor.      LABS:                        11.7   9.0   )-----------( 318      ( 03 Nov 2018 06:08 )             37.6     11-03    134<L>  |  100  |  11  ----------------------------<  142<H>  3.7   |  27  |  0.67    Ca    9.1      03 Nov 2018 06:08                          MICROBIOLOGY: (if applicable)    RADIOLOGY & ADDITIONAL STUDIES:  EKG:   CXR:  ECHO:    IMPRESSION: 73y Female PAST MEDICAL & SURGICAL HISTORY:  Interstitial lung disease  Chronic diastolic congestive heart failure  Chronic obstructive pulmonary disease, unspecified COPD type  Hypercholesteremia  Arthritis  Hypothyroidism  Bipolar 1 disorder  Schizo affective schizophrenia  HTN (hypertension)  No significant past surgical history   p/w         Impression; 72 Y/O Female with prior mentioned multiple chronic conditions . Presented with 104 at NH  with Tachycardia 167 and altered mental status and confusion. Code Sepsis was called in ED. Has COPD with on and off hypoxia. On Medicine Unit. RVP negative.       +pseudomonas bacteremia, completed course of antiantibx    Suggestion;  -Continue oxygen supplementation prn to keep O2 saturation >90%.   -Continue DuoNeb Q 6 hours prn  -start symbicort 160/4.5 q12h  -start spiriva daily  -DVT/ GI prophylactic.   -Continue antibiotic as per ID recommendation.    -Aspiration precautions.

## 2018-11-04 ENCOUNTER — TRANSCRIPTION ENCOUNTER (OUTPATIENT)
Age: 73
End: 2018-11-04

## 2018-11-04 LAB
ANION GAP SERPL CALC-SCNC: 10 MMOL/L — SIGNIFICANT CHANGE UP (ref 5–17)
BUN SERPL-MCNC: 14 MG/DL — SIGNIFICANT CHANGE UP (ref 7–18)
CALCIUM SERPL-MCNC: 9.7 MG/DL — SIGNIFICANT CHANGE UP (ref 8.4–10.5)
CHLORIDE SERPL-SCNC: 99 MMOL/L — SIGNIFICANT CHANGE UP (ref 96–108)
CO2 SERPL-SCNC: 27 MMOL/L — SIGNIFICANT CHANGE UP (ref 22–31)
CREAT SERPL-MCNC: 0.89 MG/DL — SIGNIFICANT CHANGE UP (ref 0.5–1.3)
CULTURE RESULTS: SIGNIFICANT CHANGE UP
CULTURE RESULTS: SIGNIFICANT CHANGE UP
GLUCOSE BLDC GLUCOMTR-MCNC: 137 MG/DL — HIGH (ref 70–99)
GLUCOSE BLDC GLUCOMTR-MCNC: 158 MG/DL — HIGH (ref 70–99)
GLUCOSE SERPL-MCNC: 132 MG/DL — HIGH (ref 70–99)
HCT VFR BLD CALC: 40.5 % — SIGNIFICANT CHANGE UP (ref 34.5–45)
HGB BLD-MCNC: 12.6 G/DL — SIGNIFICANT CHANGE UP (ref 11.5–15.5)
MCHC RBC-ENTMCNC: 24 PG — LOW (ref 27–34)
MCHC RBC-ENTMCNC: 31 GM/DL — LOW (ref 32–36)
MCV RBC AUTO: 77.4 FL — LOW (ref 80–100)
PLATELET # BLD AUTO: 334 K/UL — SIGNIFICANT CHANGE UP (ref 150–400)
POTASSIUM SERPL-MCNC: 3.7 MMOL/L — SIGNIFICANT CHANGE UP (ref 3.5–5.3)
POTASSIUM SERPL-SCNC: 3.7 MMOL/L — SIGNIFICANT CHANGE UP (ref 3.5–5.3)
RBC # BLD: 5.23 M/UL — HIGH (ref 3.8–5.2)
RBC # FLD: 17.7 % — HIGH (ref 10.3–14.5)
SODIUM SERPL-SCNC: 136 MMOL/L — SIGNIFICANT CHANGE UP (ref 135–145)
SPECIMEN SOURCE: SIGNIFICANT CHANGE UP
SPECIMEN SOURCE: SIGNIFICANT CHANGE UP
WBC # BLD: 8.8 K/UL — SIGNIFICANT CHANGE UP (ref 3.8–10.5)
WBC # FLD AUTO: 8.8 K/UL — SIGNIFICANT CHANGE UP (ref 3.8–10.5)

## 2018-11-04 RX ADMIN — Medication 81 MILLIGRAM(S): at 11:11

## 2018-11-04 RX ADMIN — Medication 1 TABLET(S): at 11:11

## 2018-11-04 RX ADMIN — LACTULOSE 20 GRAM(S): 10 SOLUTION ORAL at 05:21

## 2018-11-04 RX ADMIN — BUDESONIDE AND FORMOTEROL FUMARATE DIHYDRATE 2 PUFF(S): 160; 4.5 AEROSOL RESPIRATORY (INHALATION) at 09:05

## 2018-11-04 RX ADMIN — OLANZAPINE 10 MILLIGRAM(S): 15 TABLET, FILM COATED ORAL at 11:11

## 2018-11-04 RX ADMIN — Medication 175 MICROGRAM(S): at 05:20

## 2018-11-04 RX ADMIN — Medication 3 MILLILITER(S): at 14:39

## 2018-11-04 RX ADMIN — BUDESONIDE AND FORMOTEROL FUMARATE DIHYDRATE 2 PUFF(S): 160; 4.5 AEROSOL RESPIRATORY (INHALATION) at 05:20

## 2018-11-04 RX ADMIN — Medication 3 MILLILITER(S): at 08:53

## 2018-11-04 RX ADMIN — Medication 40 MILLIGRAM(S): at 05:20

## 2018-11-04 RX ADMIN — AMLODIPINE BESYLATE 10 MILLIGRAM(S): 2.5 TABLET ORAL at 05:20

## 2018-11-04 RX ADMIN — HEPARIN SODIUM 5000 UNIT(S): 5000 INJECTION INTRAVENOUS; SUBCUTANEOUS at 13:00

## 2018-11-04 RX ADMIN — BUDESONIDE AND FORMOTEROL FUMARATE DIHYDRATE 2 PUFF(S): 160; 4.5 AEROSOL RESPIRATORY (INHALATION) at 17:04

## 2018-11-04 RX ADMIN — Medication 3 MILLILITER(S): at 20:47

## 2018-11-04 RX ADMIN — Medication 1 DROP(S): at 05:20

## 2018-11-04 RX ADMIN — HEPARIN SODIUM 5000 UNIT(S): 5000 INJECTION INTRAVENOUS; SUBCUTANEOUS at 22:52

## 2018-11-04 RX ADMIN — HEPARIN SODIUM 5000 UNIT(S): 5000 INJECTION INTRAVENOUS; SUBCUTANEOUS at 05:20

## 2018-11-04 NOTE — DISCHARGE NOTE ADULT - HOSPITAL COURSE
Patient is 72yo F with PMH of HTN, diastolic CHf, HLD, Type 2 DM, COPD/emphysema, cor pulmonale, hypothyroidism, venous insufficiency, bipolar, schizoaffective disorder, GERD, allergic rhinitis, dry eyes, brought in by NH due to  fever of 104F axillary at NH, given cold compresses and Motrin, with continued chills. At NH, patient also noted to be tachycardic to 167bpm with altered mental status and confusion, O2 sat of 87% on room air, with nebulizer treatment and O2 via nasal cannula given. In the ED. patient was CODE SEPSIS due to high fever of 103, tachycardia, and elevated lactate. When examined by me, patient denies all complaints, however able to recall the story as to why she is here. Patient otherwise denies cough, nasal congestion, chest pain, shortness of breath, burning with urination,     Patient was admitted for sepsis, prophylactic abx given. Cultures were sent. Urine cultures came back positive for Klebsiella, blood cultures were positive for  CRE Pseudomonas. She was started on amikacin, ID was following. Kidney function was closely monitored.    Given patient's improved clinical status and current hemodynamic stability, decision was made to discharge. Discussed with attending  Please refer to patient's complete medical chart with documents for a full hospital course, for this is only a brief summary.

## 2018-11-04 NOTE — DISCHARGE NOTE ADULT - PLAN OF CARE
Management of underlying condition You came here with fever, tachycardia, elevated lactate. Your urine cultures were positive for klebsiella and blood cultures positive for pseudomonas resistant to carbapenem. You were started on amikacin and you completed the dose of antibiotics. Repeat blood cultures are negative. You clinically improved. Follow up with your Primary medical doctor and continue the same medication regimen. You have a history of COPD. Follow up with your Primary medical doctor and continue the same medication regimen. Control of blood pressure in normal range. You have a history of hypertension. Your blood pressure has been controlled with Norvac 10mg. Continue with your medications as prescribed. You are advised to eat DASH diet. Please monitor your blood pressure daily, record it and take it to your primary doctor. Follow up with your Primary medical doctor. You have a history of Hypothyroidism. Follow up with your Primary medical doctor and continue the same medication regimen. You have a history of Schizophrenia. Follow up with your Primary medical doctor and continue the same medication regimen.

## 2018-11-04 NOTE — PROGRESS NOTE ADULT - PROBLEM SELECTOR PLAN 1
Patient is s/p sepsis, now resolved  -Currently not spiking fevers  -Blood cultures:  Carbapenem resistant Pseudomonas	  -C/w amikacin. 7th day   -Will monitor kidney function closely  -Patient will need at least 10 days of IV abx. No plan for PICC line.   -Repeat cultures from 10/29 are negative till date  -PT recommended LICO  -ID Dr. Aparicio

## 2018-11-04 NOTE — PROGRESS NOTE ADULT - SUBJECTIVE AND OBJECTIVE BOX
PGY 1 Note discussed with supervising resident and primary attending    Patient is a 73y old  Female who presents with a chief complaint of sepsis (03 Nov 2018 22:25)      INTERVAL HPI/OVERNIGHT EVENTS: no events noted overnight.    MEDICATIONS  (STANDING):  ALBUTerol/ipratropium for Nebulization 3 milliLiter(s) Nebulizer every 6 hours  amiKACIN  IVPB 1000 milliGRAM(s) IV Intermittent daily  amLODIPine   Tablet 10 milliGRAM(s) Oral daily  artificial tears (preservative free) Ophthalmic Solution 1 Drop(s) Both EYES two times a day  aspirin enteric coated 81 milliGRAM(s) Oral daily  buDESOnide  80 MICROgram(s)/formoterol 4.5 MICROgram(s) Inhaler 2 Puff(s) Inhalation two times a day  buDESOnide 160 MICROgram(s)/formoterol 4.5 MICROgram(s) Inhaler 2 Puff(s) Inhalation every 12 hours  furosemide    Tablet 40 milliGRAM(s) Oral daily  heparin  Injectable 5000 Unit(s) SubCutaneous every 8 hours  insulin lispro (HumaLOG) corrective regimen sliding scale   SubCutaneous three times a day before meals  lactulose Syrup 20 Gram(s) Oral two times a day  levothyroxine 175 MICROGram(s) Oral daily  multivitamin 1 Tablet(s) Oral daily  OLANZapine 10 milliGRAM(s) Oral daily  tiotropium 18 MICROgram(s) Capsule 1 Capsule(s) Inhalation daily    MEDICATIONS  (PRN):  acetaminophen   Tablet .. 650 milliGRAM(s) Oral every 6 hours PRN Temp greater or equal to 38C (100.4F)      __________________________________________________  REVIEW OF SYSTEMS:    CONSTITUTIONAL: No fever,   EYES: no acute visual disturbances  NECK: No pain or stiffness  RESPIRATORY: No cough; No shortness of breath  CARDIOVASCULAR: No chest pain, no palpitations  GASTROINTESTINAL: No pain. No nausea or vomiting; No diarrhea   NEUROLOGICAL: No headache or numbness, no tremors  MUSCULOSKELETAL: No joint pain, no muscle pain  GENITOURINARY: no dysuria, no frequency, no hesitancy  PSYCHIATRY: no depression , no anxiety  ALL OTHER  ROS negative        Vital Signs Last 24 Hrs  T(C): 36.6 (04 Nov 2018 05:18), Max: 36.7 (03 Nov 2018 14:44)  T(F): 97.9 (04 Nov 2018 05:18), Max: 98 (03 Nov 2018 14:44)  HR: 86 (04 Nov 2018 05:18) (86 - 98)  BP: 137/74 (04 Nov 2018 05:18) (111/65 - 137/74)  BP(mean): --  RR: 18 (04 Nov 2018 05:18) (17 - 18)  SpO2: 94% (04 Nov 2018 05:18) (92% - 98%)    ________________________________________________  PHYSICAL EXAM:  GENERAL: NAD  HEENT: Normocephalic;  conjunctivae and sclerae clear; moist mucous membranes;   NECK : supple  CHEST/LUNG: Clear to auscultation bilaterally with good air entry   HEART: S1 S2  regular; no murmurs, gallops or rubs  ABDOMEN: Soft, Nontender, Nondistended; Bowel sounds present  EXTREMITIES: no cyanosis; no edema; no calf tenderness  SKIN: warm and dry; no rash  NERVOUS SYSTEM:  Awake and alert; Oriented  to place, person and time ; no new deficits    _________________________________________________  LABS:                        12.6   8.8   )-----------( 334      ( 04 Nov 2018 07:16 )             40.5     11-03    134<L>  |  100  |  11  ----------------------------<  142<H>  3.7   |  27  |  0.67    Ca    9.1      03 Nov 2018 06:08      LABS from today testing.     CAPILLARY BLOOD GLUCOSE      POCT Blood Glucose.: 141 mg/dL (03 Nov 2018 12:02)        RADIOLOGY & ADDITIONAL TESTS:    Imaging Personally Reviewed:  YES    Consultant(s) Notes Reviewed:   YES    Care Discussed with Consultants : YES     Plan of care was discussed with patient and /or primary care giver; all questions and concerns were addressed and care was aligned with patient's wishes.

## 2018-11-04 NOTE — CHART NOTE - NSCHARTNOTEFT_GEN_A_CORE
Patient refused taking her IV antibiotic. She was counselled and explained regarding the importance of completing her antibiotic course. however, she continued to deny.

## 2018-11-04 NOTE — DISCHARGE NOTE ADULT - PATIENT PORTAL LINK FT
You can access the [facility] Patient Portal, offered by [facility], by registering with the following website: [email or web address]

## 2018-11-04 NOTE — DISCHARGE NOTE ADULT - CARE PLAN
Principal Discharge DX:	Bacteremia  Goal:	Management of underlying condition  Assessment and plan of treatment:	You came here with fever, tachycardia, elevated lactate. Your urine cultures were positive for klebsiella and blood cultures positive for pseudomonas resistant to carbapenem. You were started on amikacin and you completed the dose of antibiotics. Repeat blood cultures are negative. You clinically improved. Follow up with your Primary medical doctor and continue the same medication regimen.  Secondary Diagnosis:	Chronic obstructive pulmonary disease, unspecified COPD type  Assessment and plan of treatment:	You have a history of COPD. Follow up with your Primary medical doctor and continue the same medication regimen.  Secondary Diagnosis:	HTN (hypertension)  Goal:	Control of blood pressure in normal range.  Assessment and plan of treatment:	You have a history of hypertension. Your blood pressure has been controlled with Norvac 10mg. Continue with your medications as prescribed. You are advised to eat DASH diet. Please monitor your blood pressure daily, record it and take it to your primary doctor. Follow up with your Primary medical doctor.  Secondary Diagnosis:	Hypothyroidism  Goal:	Management of underlying condition  Assessment and plan of treatment:	You have a history of Hypothyroidism. Follow up with your Primary medical doctor and continue the same medication regimen.  Secondary Diagnosis:	Schizo affective schizophrenia  Goal:	Management of underlying condition  Assessment and plan of treatment:	You have a history of Schizophrenia. Follow up with your Primary medical doctor and continue the same medication regimen.

## 2018-11-04 NOTE — DISCHARGE NOTE ADULT - MEDICATION SUMMARY - MEDICATIONS TO TAKE
I will START or STAY ON the medications listed below when I get home from the hospital:    aspirin 81 mg oral tablet  -- 1 tab(s) by mouth once a day  -- Indication: For Cardioprotective    metFORMIN 500 mg oral tablet  -- 1 tab(s) by mouth 2 times a day  -- Indication: For Diabetes    ZyPREXA 10 mg oral tablet  -- 1 tab(s) by mouth once a day  -- Indication: For Schizophrenia    albuterol 90 mcg/inh inhalation aerosol  -- 1 puff(s) inhaled every 6 hours  -- Indication: For Copd    albuterol-ipratropium 2.5 mg-0.5 mg/3 mL inhalation solution  -- 3 milliliter(s) inhaled every 6 hours  -- Indication: For Copd    budesonide-formoterol 80 mcg-4.5 mcg/inh inhalation aerosol  -- 2 puff(s) inhaled 2 times a day  -- Indication: For Copd    Norvasc 10 mg oral tablet  -- 1 tab(s) by mouth once a day  -- Indication: For HTN (hypertension)    furosemide 20 mg oral tablet  -- 2 tab(s) by mouth once a day  -- Indication: For Chf    lactulose 10 g/15 mL oral syrup  -- 30 milliliter(s) by mouth 2 times a day  -- Indication: For Constipation    Systane ophthalmic solution  -- 1 drop(s) to each affected eye 2 times a day  -- Indication: For glaucoma    Synthroid 175 mcg (0.175 mg) oral tablet  -- 1 tab(s) by mouth once a day  -- Indication: For Hypothyroidism    multivitamin  --     -- Indication: For Suppliment

## 2018-11-05 LAB
ANION GAP SERPL CALC-SCNC: 9 MMOL/L — SIGNIFICANT CHANGE UP (ref 5–17)
BUN SERPL-MCNC: 13 MG/DL — SIGNIFICANT CHANGE UP (ref 7–18)
CALCIUM SERPL-MCNC: 9.4 MG/DL — SIGNIFICANT CHANGE UP (ref 8.4–10.5)
CHLORIDE SERPL-SCNC: 96 MMOL/L — SIGNIFICANT CHANGE UP (ref 96–108)
CO2 SERPL-SCNC: 28 MMOL/L — SIGNIFICANT CHANGE UP (ref 22–31)
CREAT SERPL-MCNC: 0.86 MG/DL — SIGNIFICANT CHANGE UP (ref 0.5–1.3)
GLUCOSE BLDC GLUCOMTR-MCNC: 148 MG/DL — HIGH (ref 70–99)
GLUCOSE BLDC GLUCOMTR-MCNC: 153 MG/DL — HIGH (ref 70–99)
GLUCOSE BLDC GLUCOMTR-MCNC: 161 MG/DL — HIGH (ref 70–99)
GLUCOSE BLDC GLUCOMTR-MCNC: 162 MG/DL — HIGH (ref 70–99)
GLUCOSE SERPL-MCNC: 138 MG/DL — HIGH (ref 70–99)
HCT VFR BLD CALC: 38.9 % — SIGNIFICANT CHANGE UP (ref 34.5–45)
HGB BLD-MCNC: 12.1 G/DL — SIGNIFICANT CHANGE UP (ref 11.5–15.5)
MCHC RBC-ENTMCNC: 24 PG — LOW (ref 27–34)
MCHC RBC-ENTMCNC: 31.1 GM/DL — LOW (ref 32–36)
MCV RBC AUTO: 77.2 FL — LOW (ref 80–100)
PLATELET # BLD AUTO: 348 K/UL — SIGNIFICANT CHANGE UP (ref 150–400)
POTASSIUM SERPL-MCNC: 3.3 MMOL/L — LOW (ref 3.5–5.3)
POTASSIUM SERPL-SCNC: 3.3 MMOL/L — LOW (ref 3.5–5.3)
RBC # BLD: 5.04 M/UL — SIGNIFICANT CHANGE UP (ref 3.8–5.2)
RBC # FLD: 17.5 % — HIGH (ref 10.3–14.5)
SODIUM SERPL-SCNC: 133 MMOL/L — LOW (ref 135–145)
WBC # BLD: 9 K/UL — SIGNIFICANT CHANGE UP (ref 3.8–10.5)
WBC # FLD AUTO: 9 K/UL — SIGNIFICANT CHANGE UP (ref 3.8–10.5)

## 2018-11-05 RX ORDER — ALBUTEROL 90 UG/1
1 AEROSOL, METERED ORAL EVERY 6 HOURS
Refills: 0 | Status: DISCONTINUED | OUTPATIENT
Start: 2018-11-05 | End: 2018-11-08

## 2018-11-05 RX ORDER — POTASSIUM CHLORIDE 20 MEQ
40 PACKET (EA) ORAL ONCE
Refills: 0 | Status: COMPLETED | OUTPATIENT
Start: 2018-11-05 | End: 2018-11-05

## 2018-11-05 RX ADMIN — Medication 40 MILLIGRAM(S): at 05:21

## 2018-11-05 RX ADMIN — BUDESONIDE AND FORMOTEROL FUMARATE DIHYDRATE 2 PUFF(S): 160; 4.5 AEROSOL RESPIRATORY (INHALATION) at 22:04

## 2018-11-05 RX ADMIN — HEPARIN SODIUM 5000 UNIT(S): 5000 INJECTION INTRAVENOUS; SUBCUTANEOUS at 22:04

## 2018-11-05 RX ADMIN — Medication 1 TABLET(S): at 11:04

## 2018-11-05 RX ADMIN — AMIKACIN SULFATE 104 MILLIGRAM(S): 250 INJECTION, SOLUTION INTRAMUSCULAR; INTRAVENOUS at 11:04

## 2018-11-05 RX ADMIN — Medication 1: at 12:11

## 2018-11-05 RX ADMIN — AMLODIPINE BESYLATE 10 MILLIGRAM(S): 2.5 TABLET ORAL at 05:21

## 2018-11-05 RX ADMIN — OLANZAPINE 10 MILLIGRAM(S): 15 TABLET, FILM COATED ORAL at 11:04

## 2018-11-05 RX ADMIN — TIOTROPIUM BROMIDE 1 CAPSULE(S): 18 CAPSULE ORAL; RESPIRATORY (INHALATION) at 12:48

## 2018-11-05 RX ADMIN — Medication 3 MILLILITER(S): at 09:28

## 2018-11-05 RX ADMIN — Medication 3 MILLILITER(S): at 21:48

## 2018-11-05 RX ADMIN — HEPARIN SODIUM 5000 UNIT(S): 5000 INJECTION INTRAVENOUS; SUBCUTANEOUS at 13:09

## 2018-11-05 RX ADMIN — LACTULOSE 20 GRAM(S): 10 SOLUTION ORAL at 05:25

## 2018-11-05 RX ADMIN — LACTULOSE 20 GRAM(S): 10 SOLUTION ORAL at 17:37

## 2018-11-05 RX ADMIN — HEPARIN SODIUM 5000 UNIT(S): 5000 INJECTION INTRAVENOUS; SUBCUTANEOUS at 05:23

## 2018-11-05 RX ADMIN — Medication 1 DROP(S): at 17:37

## 2018-11-05 RX ADMIN — Medication 81 MILLIGRAM(S): at 11:04

## 2018-11-05 RX ADMIN — BUDESONIDE AND FORMOTEROL FUMARATE DIHYDRATE 2 PUFF(S): 160; 4.5 AEROSOL RESPIRATORY (INHALATION) at 10:57

## 2018-11-05 RX ADMIN — BUDESONIDE AND FORMOTEROL FUMARATE DIHYDRATE 2 PUFF(S): 160; 4.5 AEROSOL RESPIRATORY (INHALATION) at 17:22

## 2018-11-05 RX ADMIN — Medication 40 MILLIEQUIVALENT(S): at 11:04

## 2018-11-05 RX ADMIN — Medication 1: at 17:22

## 2018-11-05 RX ADMIN — Medication 175 MICROGRAM(S): at 05:22

## 2018-11-05 NOTE — PROGRESS NOTE ADULT - PROBLEM SELECTOR PLAN 1
Patient is s/p sepsis, now resolved  -Currently Afebrile  C/w amikacin till 11/7   -Will monitor kidney function closely  Repeat cultures from 10/29 are negative till date  -PT recommended LICO  -ID Dr. Aparicio Patient is s/p sepsis, now resolved  -Currently Afebrile  C/w amikacin till 11/8   -Will monitor kidney function closely  Repeat cultures from 10/29 are negative till date  -PT recommended LICO  -ID Dr. Aparicio

## 2018-11-05 NOTE — PROGRESS NOTE ADULT - SUBJECTIVE AND OBJECTIVE BOX
73y Female    Meds:  amiKACIN  IVPB 1000 milliGRAM(s) IV Intermittent daily    Allergies    Avelox (Unknown)  moxifloxacin (Unknown)    Intolerances        VITALS:  Vital Signs Last 24 Hrs  T(C): 36.7 (05 Nov 2018 13:41), Max: 37.1 (04 Nov 2018 22:02)  T(F): 98 (05 Nov 2018 13:41), Max: 98.8 (05 Nov 2018 05:02)  HR: 95 (05 Nov 2018 13:41) (67 - 101)  BP: 122/70 (05 Nov 2018 13:41) (116/60 - 128/63)  BP(mean): --  RR: 16 (05 Nov 2018 13:41) (16 - 18)  SpO2: 95% (05 Nov 2018 13:41) (91% - 95%)    LABS/DIAGNOSTIC TESTS:                          12.1   9.0   )-----------( 348      ( 05 Nov 2018 07:51 )             38.9         11-05    133<L>  |  96  |  13  ----------------------------<  138<H>  3.3<L>   |  28  |  0.86    Ca    9.4      05 Nov 2018 07:51            CULTURES: .Blood Blood-Peripheral  10-30 @ 15:16   No growth at 5 days.  --  --      .Blood Blood-Peripheral  10-29 @ 23:11   No growth at 5 days.  --  --      .Blood Blood-Peripheral  10-29 @ 18:15   No growth at 5 days.  --  --      .Blood Blood-Peripheral  10-27 @ 00:14   Growth in aerobic bottle: Pseudomonas aeruginosa (Carbapenem Resistant)      .Urine Clean Catch (Midstream)  10-26 @ 23:47   >100,000 CFU/ml Klebsiella pneumoniae  --  Klebsiella pneumoniae            RADIOLOGY:      ROS:  [  ] UNABLE TO ELICIT 73y Female who is looking and feeling great, she has no fevers , chills or other complaints except for constipation. she is on D#7 of Amikacin today, so she needs 3 days more. Her repeat blood cultures were negative even without Amikacin but given that 4 of 4 bottles were positive can not treat as a contaminant. Her renal function has remained stable on amikacin and her hearing has not changed per the patient.    Meds:  amiKACIN  IVPB 1000 milliGRAM(s) IV Intermittent daily    Allergies    Avelox (Unknown)  moxifloxacin (Unknown)    Intolerances        VITALS:  Vital Signs Last 24 Hrs  T(C): 36.7 (05 Nov 2018 13:41), Max: 37.1 (04 Nov 2018 22:02)  T(F): 98 (05 Nov 2018 13:41), Max: 98.8 (05 Nov 2018 05:02)  HR: 95 (05 Nov 2018 13:41) (67 - 101)  BP: 122/70 (05 Nov 2018 13:41) (116/60 - 128/63)  BP(mean): --  RR: 16 (05 Nov 2018 13:41) (16 - 18)  SpO2: 95% (05 Nov 2018 13:41) (91% - 95%)    LABS/DIAGNOSTIC TESTS:                          12.1   9.0   )-----------( 348      ( 05 Nov 2018 07:51 )             38.9         11-05    133<L>  |  96  |  13  ----------------------------<  138<H>  3.3<L>   |  28  |  0.86    Ca    9.4      05 Nov 2018 07:51            CULTURES: .Blood Blood-Peripheral  10-30 @ 15:16   No growth at 5 days.  --  --      .Blood Blood-Peripheral  10-29 @ 23:11   No growth at 5 days.  --  --      .Blood Blood-Peripheral  10-29 @ 18:15   No growth at 5 days.  --  --      .Blood Blood-Peripheral  10-27 @ 00:14   Growth in aerobic bottle: Pseudomonas aeruginosa (Carbapenem Resistant)      .Urine Clean Catch (Midstream)  10-26 @ 23:47   >100,000 CFU/ml Klebsiella pneumoniae  --  Klebsiella pneumoniae            RADIOLOGY:      ROS:  [  ] UNABLE TO ELICIT

## 2018-11-05 NOTE — PROGRESS NOTE ADULT - SUBJECTIVE AND OBJECTIVE BOX
Patient seen and examined.     MEDICATIONS  (STANDING):  ALBUTerol/ipratropium for Nebulization 3 milliLiter(s) Nebulizer every 6 hours  amiKACIN  IVPB 1000 milliGRAM(s) IV Intermittent daily  amLODIPine   Tablet 10 milliGRAM(s) Oral daily  artificial tears (preservative free) Ophthalmic Solution 1 Drop(s) Both EYES two times a day  aspirin enteric coated 81 milliGRAM(s) Oral daily  buDESOnide  80 MICROgram(s)/formoterol 4.5 MICROgram(s) Inhaler 2 Puff(s) Inhalation two times a day  buDESOnide 160 MICROgram(s)/formoterol 4.5 MICROgram(s) Inhaler 2 Puff(s) Inhalation every 12 hours  furosemide    Tablet 40 milliGRAM(s) Oral daily  heparin  Injectable 5000 Unit(s) SubCutaneous every 8 hours  insulin lispro (HumaLOG) corrective regimen sliding scale   SubCutaneous three times a day before meals  lactulose Syrup 20 Gram(s) Oral two times a day  levothyroxine 175 MICROGram(s) Oral daily  multivitamin 1 Tablet(s) Oral daily  OLANZapine 10 milliGRAM(s) Oral daily  potassium chloride   Powder 40 milliEquivalent(s) Oral once  tiotropium 18 MICROgram(s) Capsule 1 Capsule(s) Inhalation daily      MEDICATIONS  (PRN):  acetaminophen   Tablet .. 650 milliGRAM(s) Oral every 6 hours PRN Temp greater or equal to 38C (100.4F)     Medications up to date at time of exam.    PHYSICAL EXAMINATION:  Patient has no new complaints.  GENERAL: The patient is a well-developed, well-nourished, in no apparent distress.     Vital Signs Last 24 Hrs  T(C): 37.1 (05 Nov 2018 05:02), Max: 37.1 (04 Nov 2018 22:02)  T(F): 98.8 (05 Nov 2018 05:02), Max: 98.8 (05 Nov 2018 05:02)  HR: 88 (05 Nov 2018 05:02) (67 - 101)  BP: 128/63 (05 Nov 2018 05:02) (116/60 - 128/63)  BP(mean): --  RR: 18 (05 Nov 2018 05:02) (17 - 18)  SpO2: 94% (05 Nov 2018 05:02) (91% - 94%)   (if applicable)    Chest Tube (if applicable)    HEENT: Head is normocephalic and atraumatic.     NECK: Supple, no palpable adenopathy.    LUNGS: Clear to auscultation, no wheezing, rales, or rhonchi.    HEART: Regular rate and rhythm without murmur.    ABDOMEN: Soft, nontender, and nondistended.      EXTREMITIES: Without any cyanosis, clubbing, rash, lesions or +B/L LE erythema +2 edema, improving    NEUROLOGIC: Awake, alert.    SKIN: Warm, dry, good turgor.    LABS:                        12.1   9.0   )-----------( 348      ( 05 Nov 2018 07:51 )             38.9     11-05    133<L>  |  96  |  13  ----------------------------<  138<H>  3.3<L>   |  28  |  0.86    Ca    9.4      05 Nov 2018 07:51                          MICROBIOLOGY: (if applicable)    RADIOLOGY & ADDITIONAL STUDIES:  EKG:   CXR:  ECHO:    IMPRESSION: 73y Female PAST MEDICAL & SURGICAL HISTORY:  Interstitial lung disease  Chronic diastolic congestive heart failure  Chronic obstructive pulmonary disease, unspecified COPD type  Hypercholesteremia  Arthritis  Hypothyroidism  Bipolar 1 disorder  Schizo affective schizophrenia  HTN (hypertension)  No significant past surgical history         Impression; 74 Y/O Female with prior mentioned multiple chronic conditions . Presented with 104 at NH  with Tachycardia 167 and altered mental status and confusion. Code Sepsis was called in ED. Has COPD with on and off hypoxia. On Medicine Unit. RVP negative.       +pseudomonas bacteremia  leukocytosis improved    Suggestion;  Continue oxygen supplementation prn to keep O2 saturation >90%.   Continue DuoNeb Q 6 hours. Budesonide twice daily.  DVT/ GI prophylactic.   Continue antibiotic as per ID recommendation.    Aspiration precautions.     Repleat potassium Patient seen and examined.     MEDICATIONS  (STANDING):  ALBUTerol/ipratropium for Nebulization 3 milliLiter(s) Nebulizer every 6 hours  amiKACIN  IVPB 1000 milliGRAM(s) IV Intermittent daily  amLODIPine   Tablet 10 milliGRAM(s) Oral daily  artificial tears (preservative free) Ophthalmic Solution 1 Drop(s) Both EYES two times a day  aspirin enteric coated 81 milliGRAM(s) Oral daily  buDESOnide  80 MICROgram(s)/formoterol 4.5 MICROgram(s) Inhaler 2 Puff(s) Inhalation two times a day  buDESOnide 160 MICROgram(s)/formoterol 4.5 MICROgram(s) Inhaler 2 Puff(s) Inhalation every 12 hours  furosemide    Tablet 40 milliGRAM(s) Oral daily  heparin  Injectable 5000 Unit(s) SubCutaneous every 8 hours  insulin lispro (HumaLOG) corrective regimen sliding scale   SubCutaneous three times a day before meals  lactulose Syrup 20 Gram(s) Oral two times a day  levothyroxine 175 MICROGram(s) Oral daily  multivitamin 1 Tablet(s) Oral daily  OLANZapine 10 milliGRAM(s) Oral daily  potassium chloride   Powder 40 milliEquivalent(s) Oral once  tiotropium 18 MICROgram(s) Capsule 1 Capsule(s) Inhalation daily      MEDICATIONS  (PRN):  acetaminophen   Tablet .. 650 milliGRAM(s) Oral every 6 hours PRN Temp greater or equal to 38C (100.4F)     Medications up to date at time of exam.    PHYSICAL EXAMINATION:  Patient has no new complaints.  GENERAL: The patient is a well-developed, well-nourished, in no apparent distress.     Vital Signs Last 24 Hrs  T(C): 37.1 (05 Nov 2018 05:02), Max: 37.1 (04 Nov 2018 22:02)  T(F): 98.8 (05 Nov 2018 05:02), Max: 98.8 (05 Nov 2018 05:02)  HR: 88 (05 Nov 2018 05:02) (67 - 101)  BP: 128/63 (05 Nov 2018 05:02) (116/60 - 128/63)  BP(mean): --  RR: 18 (05 Nov 2018 05:02) (17 - 18)  SpO2: 94% (05 Nov 2018 05:02) (91% - 94%)   (if applicable)    Chest Tube (if applicable)    HEENT: Head is normocephalic and atraumatic.     NECK: Supple, no palpable adenopathy.    LUNGS: Clear to auscultation, no wheezing, rales, or rhonchi.    HEART: Regular rate and rhythm without murmur.    ABDOMEN: Soft, nontender, and nondistended.      EXTREMITIES: Without any cyanosis, clubbing, rash, lesions or +B/L LE erythema +2 edema, improving    NEUROLOGIC: Awake, alert.    SKIN: Warm, dry, good turgor.    LABS:                        12.1   9.0   )-----------( 348      ( 05 Nov 2018 07:51 )             38.9     11-05    133<L>  |  96  |  13  ----------------------------<  138<H>  3.3<L>   |  28  |  0.86    Ca    9.4      05 Nov 2018 07:51                          MICROBIOLOGY: (if applicable)    RADIOLOGY & ADDITIONAL STUDIES:  EKG:   CXR:  ECHO:    IMPRESSION: 73y Female PAST MEDICAL & SURGICAL HISTORY:  Interstitial lung disease  Chronic diastolic congestive heart failure  Chronic obstructive pulmonary disease, unspecified COPD type  Hypercholesteremia  Arthritis  Hypothyroidism  Bipolar 1 disorder  Schizo affective schizophrenia  HTN (hypertension)  No significant past surgical history         Impression; 72 Y/O Female with prior mentioned multiple chronic conditions . Presented with 104 at NH  with Tachycardia 167 and altered mental status and confusion. Code Sepsis was called in ED. Has COPD with on and off hypoxia. On Medicine Unit. RVP negative.       +pseudomonas bacteremia  leukocytosis improved    Suggestion;  Continue oxygen supplementation prn to keep O2 saturation >90%.   Continue DuoNeb Q 6 hours. Budesonide twice daily.  DVT/ GI prophylactic.   Continue antibiotic as per ID recommendation.    Aspiration precautions.     Repleat potassium  Agree with above assessment and plan as transcribed.

## 2018-11-05 NOTE — PROGRESS NOTE ADULT - SUBJECTIVE AND OBJECTIVE BOX
PGY 1 Note discussed with supervising resident and primary attending    Patient is a 73y old  Female who presents with a chief complaint of SOB (05 Nov 2018 10:17)      INTERVAL HPI/OVERNIGHT EVENTS: Patient is laying comfortably on bed.    MEDICATIONS  (STANDING):  ALBUTerol/ipratropium for Nebulization 3 milliLiter(s) Nebulizer every 6 hours  amiKACIN  IVPB 1000 milliGRAM(s) IV Intermittent daily  amLODIPine   Tablet 10 milliGRAM(s) Oral daily  artificial tears (preservative free) Ophthalmic Solution 1 Drop(s) Both EYES two times a day  aspirin enteric coated 81 milliGRAM(s) Oral daily  buDESOnide  80 MICROgram(s)/formoterol 4.5 MICROgram(s) Inhaler 2 Puff(s) Inhalation two times a day  buDESOnide 160 MICROgram(s)/formoterol 4.5 MICROgram(s) Inhaler 2 Puff(s) Inhalation every 12 hours  furosemide    Tablet 40 milliGRAM(s) Oral daily  heparin  Injectable 5000 Unit(s) SubCutaneous every 8 hours  insulin lispro (HumaLOG) corrective regimen sliding scale   SubCutaneous three times a day before meals  lactulose Syrup 20 Gram(s) Oral two times a day  levothyroxine 175 MICROGram(s) Oral daily  multivitamin 1 Tablet(s) Oral daily  OLANZapine 10 milliGRAM(s) Oral daily  tiotropium 18 MICROgram(s) Capsule 1 Capsule(s) Inhalation daily    MEDICATIONS  (PRN):  acetaminophen   Tablet .. 650 milliGRAM(s) Oral every 6 hours PRN Temp greater or equal to 38C (100.4F)      __________________________________________________  REVIEW OF SYSTEMS:   Denies nausea, vomiting, diarrhea, abdominal pain, chest pain, palpitations, dizziness, headache, cough, wheezing, joint pain or swelling, fever, chills.   D         Vital Signs Last 24 Hrs  T(C): 36.7 (05 Nov 2018 13:41), Max: 37.1 (04 Nov 2018 22:02)  T(F): 98 (05 Nov 2018 13:41), Max: 98.8 (05 Nov 2018 05:02)  HR: 95 (05 Nov 2018 13:41) (67 - 101)  BP: 122/70 (05 Nov 2018 13:41) (116/60 - 128/63)  BP(mean): --  RR: 16 (05 Nov 2018 13:41) (16 - 18)  SpO2: 95% (05 Nov 2018 13:41) (91% - 95%)    ________________________________________________  PHYSICAL EXAM:  GENERAL: NAD  HEENT: Normocephalic;  conjunctivae and sclerae clear; moist mucous membranes;   NECK : supple  CHEST/LUNG: Clear to auscultation bilaterally with good air entry   HEART: S1 S2  regular; no murmurs, gallops or rubs  ABDOMEN: Soft, Nontender, Nondistended; Bowel sounds present  EXTREMITIES: no cyanosis; no edema; no calf tenderness  SKIN: warm and dry; no rash  NERVOUS SYSTEM:  Awake and alert; Oriented  to place, person and time ; no new deficits    _________________________________________________  LABS:                        12.1   9.0   )-----------( 348      ( 05 Nov 2018 07:51 )             38.9     11-05    133<L>  |  96  |  13  ----------------------------<  138<H>  3.3<L>   |  28  |  0.86    Ca    9.4      05 Nov 2018 07:51          CAPILLARY BLOOD GLUCOSE      POCT Blood Glucose.: 162 mg/dL (05 Nov 2018 11:15)  POCT Blood Glucose.: 148 mg/dL (05 Nov 2018 07:48)

## 2018-11-06 LAB
ANION GAP SERPL CALC-SCNC: 9 MMOL/L — SIGNIFICANT CHANGE UP (ref 5–17)
BUN SERPL-MCNC: 20 MG/DL — HIGH (ref 7–18)
CALCIUM SERPL-MCNC: 9.3 MG/DL — SIGNIFICANT CHANGE UP (ref 8.4–10.5)
CHLORIDE SERPL-SCNC: 99 MMOL/L — SIGNIFICANT CHANGE UP (ref 96–108)
CO2 SERPL-SCNC: 26 MMOL/L — SIGNIFICANT CHANGE UP (ref 22–31)
CREAT SERPL-MCNC: 0.93 MG/DL — SIGNIFICANT CHANGE UP (ref 0.5–1.3)
GLUCOSE BLDC GLUCOMTR-MCNC: 155 MG/DL — HIGH (ref 70–99)
GLUCOSE BLDC GLUCOMTR-MCNC: 162 MG/DL — HIGH (ref 70–99)
GLUCOSE BLDC GLUCOMTR-MCNC: 166 MG/DL — HIGH (ref 70–99)
GLUCOSE SERPL-MCNC: 154 MG/DL — HIGH (ref 70–99)
HCT VFR BLD CALC: 37.8 % — SIGNIFICANT CHANGE UP (ref 34.5–45)
HGB BLD-MCNC: 11.8 G/DL — SIGNIFICANT CHANGE UP (ref 11.5–15.5)
MCHC RBC-ENTMCNC: 24.1 PG — LOW (ref 27–34)
MCHC RBC-ENTMCNC: 31.3 GM/DL — LOW (ref 32–36)
MCV RBC AUTO: 77.1 FL — LOW (ref 80–100)
PLATELET # BLD AUTO: 335 K/UL — SIGNIFICANT CHANGE UP (ref 150–400)
POTASSIUM SERPL-MCNC: 3.7 MMOL/L — SIGNIFICANT CHANGE UP (ref 3.5–5.3)
POTASSIUM SERPL-SCNC: 3.7 MMOL/L — SIGNIFICANT CHANGE UP (ref 3.5–5.3)
RBC # BLD: 4.91 M/UL — SIGNIFICANT CHANGE UP (ref 3.8–5.2)
RBC # FLD: 17.9 % — HIGH (ref 10.3–14.5)
SODIUM SERPL-SCNC: 134 MMOL/L — LOW (ref 135–145)
WBC # BLD: 10 K/UL — SIGNIFICANT CHANGE UP (ref 3.8–10.5)
WBC # FLD AUTO: 10 K/UL — SIGNIFICANT CHANGE UP (ref 3.8–10.5)

## 2018-11-06 RX ADMIN — HEPARIN SODIUM 5000 UNIT(S): 5000 INJECTION INTRAVENOUS; SUBCUTANEOUS at 21:58

## 2018-11-06 RX ADMIN — BUDESONIDE AND FORMOTEROL FUMARATE DIHYDRATE 2 PUFF(S): 160; 4.5 AEROSOL RESPIRATORY (INHALATION) at 17:47

## 2018-11-06 RX ADMIN — OLANZAPINE 10 MILLIGRAM(S): 15 TABLET, FILM COATED ORAL at 13:14

## 2018-11-06 RX ADMIN — HEPARIN SODIUM 5000 UNIT(S): 5000 INJECTION INTRAVENOUS; SUBCUTANEOUS at 05:20

## 2018-11-06 RX ADMIN — Medication 81 MILLIGRAM(S): at 13:14

## 2018-11-06 RX ADMIN — ALBUTEROL 1 PUFF(S): 90 AEROSOL, METERED ORAL at 13:13

## 2018-11-06 RX ADMIN — BUDESONIDE AND FORMOTEROL FUMARATE DIHYDRATE 2 PUFF(S): 160; 4.5 AEROSOL RESPIRATORY (INHALATION) at 13:13

## 2018-11-06 RX ADMIN — Medication 175 MICROGRAM(S): at 05:20

## 2018-11-06 RX ADMIN — Medication 1 DROP(S): at 18:34

## 2018-11-06 RX ADMIN — Medication 40 MILLIGRAM(S): at 05:19

## 2018-11-06 RX ADMIN — ALBUTEROL 1 PUFF(S): 90 AEROSOL, METERED ORAL at 21:57

## 2018-11-06 RX ADMIN — Medication 1 DROP(S): at 05:26

## 2018-11-06 RX ADMIN — Medication 1: at 11:53

## 2018-11-06 RX ADMIN — AMIKACIN SULFATE 104 MILLIGRAM(S): 250 INJECTION, SOLUTION INTRAMUSCULAR; INTRAVENOUS at 13:13

## 2018-11-06 RX ADMIN — LACTULOSE 20 GRAM(S): 10 SOLUTION ORAL at 18:34

## 2018-11-06 RX ADMIN — LACTULOSE 20 GRAM(S): 10 SOLUTION ORAL at 05:20

## 2018-11-06 RX ADMIN — AMLODIPINE BESYLATE 10 MILLIGRAM(S): 2.5 TABLET ORAL at 05:19

## 2018-11-06 RX ADMIN — BUDESONIDE AND FORMOTEROL FUMARATE DIHYDRATE 2 PUFF(S): 160; 4.5 AEROSOL RESPIRATORY (INHALATION) at 05:21

## 2018-11-06 RX ADMIN — TIOTROPIUM BROMIDE 1 CAPSULE(S): 18 CAPSULE ORAL; RESPIRATORY (INHALATION) at 13:13

## 2018-11-06 RX ADMIN — Medication 1: at 09:08

## 2018-11-06 RX ADMIN — Medication 1 TABLET(S): at 13:14

## 2018-11-06 NOTE — PROGRESS NOTE ADULT - SUBJECTIVE AND OBJECTIVE BOX
PGY 1 Note discussed with supervising resident and primary attending    Patient is a 73y old  Female who presents with a chief complaint of Fever (05 Nov 2018 14:55)      INTERVAL HPI/OVERNIGHT EVENTS: no events noted overnight.    MEDICATIONS  (STANDING):  ALBUTerol    90 MICROgram(s) HFA Inhaler 1 Puff(s) Inhalation every 6 hours  amiKACIN  IVPB 1000 milliGRAM(s) IV Intermittent daily  amLODIPine   Tablet 10 milliGRAM(s) Oral daily  artificial tears (preservative free) Ophthalmic Solution 1 Drop(s) Both EYES two times a day  aspirin enteric coated 81 milliGRAM(s) Oral daily  buDESOnide  80 MICROgram(s)/formoterol 4.5 MICROgram(s) Inhaler 2 Puff(s) Inhalation two times a day  buDESOnide 160 MICROgram(s)/formoterol 4.5 MICROgram(s) Inhaler 2 Puff(s) Inhalation every 12 hours  furosemide    Tablet 40 milliGRAM(s) Oral daily  heparin  Injectable 5000 Unit(s) SubCutaneous every 8 hours  insulin lispro (HumaLOG) corrective regimen sliding scale   SubCutaneous three times a day before meals  lactulose Syrup 20 Gram(s) Oral two times a day  levothyroxine 175 MICROGram(s) Oral daily  multivitamin 1 Tablet(s) Oral daily  OLANZapine 10 milliGRAM(s) Oral daily  tiotropium 18 MICROgram(s) Capsule 1 Capsule(s) Inhalation daily    MEDICATIONS  (PRN):  acetaminophen   Tablet .. 650 milliGRAM(s) Oral every 6 hours PRN Temp greater or equal to 38C (100.4F)      __________________________________________________  REVIEW OF SYSTEMS:   Denies nausea, vomiting, diarrhea, abdominal pain, chest pain, palpitations, dizziness, headache, cough, wheezing, joint pain or swelling, fever, chills.   D         Vital Signs Last 24 Hrs  T(C): 36.4 (06 Nov 2018 05:18), Max: 36.7 (05 Nov 2018 13:41)  T(F): 97.6 (06 Nov 2018 05:18), Max: 98 (05 Nov 2018 13:41)  HR: 90 (06 Nov 2018 05:18) (90 - 98)  BP: 139/66 (06 Nov 2018 05:18) (101/47 - 139/66)  BP(mean): --  RR: 17 (06 Nov 2018 05:18) (16 - 17)  SpO2: 92% (06 Nov 2018 05:18) (92% - 96%)    ________________________________________________  PHYSICAL EXAM:  GENERAL: NAD  HEENT: Normocephalic;  conjunctivae and sclerae clear; moist mucous membranes;   NECK : supple  CHEST/LUNG: Clear to auscultation bilaterally with good air entry   HEART: S1 S2  regular; no murmurs, gallops or rubs  ABDOMEN: Soft, Nontender, Nondistended; Bowel sounds present  EXTREMITIES: no cyanosis; no edema; no calf tenderness  SKIN: warm and dry; no rash  NERVOUS SYSTEM:  Awake and alert; Oriented  to place, person and time ; no new deficits    _________________________________________________  LABS:                        12.1   9.0   )-----------( 348      ( 05 Nov 2018 07:51 )             38.9     11-06    134<L>  |  99  |  20<H>  ----------------------------<  154<H>  3.7   |  26  |  0.93    Ca    9.3      06 Nov 2018 07:21          CAPILLARY BLOOD GLUCOSE      POCT Blood Glucose.: 161 mg/dL (05 Nov 2018 21:46)  POCT Blood Glucose.: 153 mg/dL (05 Nov 2018 16:33)  POCT Blood Glucose.: 162 mg/dL (05 Nov 2018 11:15)

## 2018-11-06 NOTE — PROGRESS NOTE ADULT - PROBLEM SELECTOR PLAN 1
Patient is s/p sepsis, now resolved  -Currently Afebrile  C/w amikacin till 11/8   -Will monitor kidney function closely  Repeat cultures from 10/29 are negative till date  -PT recommended LICO  -ID Dr. Aparicio

## 2018-11-07 LAB
ANION GAP SERPL CALC-SCNC: 7 MMOL/L — SIGNIFICANT CHANGE UP (ref 5–17)
BUN SERPL-MCNC: 14 MG/DL — SIGNIFICANT CHANGE UP (ref 7–18)
CALCIUM SERPL-MCNC: 9.5 MG/DL — SIGNIFICANT CHANGE UP (ref 8.4–10.5)
CHLORIDE SERPL-SCNC: 98 MMOL/L — SIGNIFICANT CHANGE UP (ref 96–108)
CO2 SERPL-SCNC: 29 MMOL/L — SIGNIFICANT CHANGE UP (ref 22–31)
CREAT SERPL-MCNC: 0.75 MG/DL — SIGNIFICANT CHANGE UP (ref 0.5–1.3)
GLUCOSE BLDC GLUCOMTR-MCNC: 136 MG/DL — HIGH (ref 70–99)
GLUCOSE BLDC GLUCOMTR-MCNC: 148 MG/DL — HIGH (ref 70–99)
GLUCOSE BLDC GLUCOMTR-MCNC: 150 MG/DL — HIGH (ref 70–99)
GLUCOSE BLDC GLUCOMTR-MCNC: 162 MG/DL — HIGH (ref 70–99)
GLUCOSE SERPL-MCNC: 152 MG/DL — HIGH (ref 70–99)
HCT VFR BLD CALC: 39.9 % — SIGNIFICANT CHANGE UP (ref 34.5–45)
HGB BLD-MCNC: 12.3 G/DL — SIGNIFICANT CHANGE UP (ref 11.5–15.5)
MCHC RBC-ENTMCNC: 23.9 PG — LOW (ref 27–34)
MCHC RBC-ENTMCNC: 30.8 GM/DL — LOW (ref 32–36)
MCV RBC AUTO: 77.4 FL — LOW (ref 80–100)
PLATELET # BLD AUTO: 333 K/UL — SIGNIFICANT CHANGE UP (ref 150–400)
POTASSIUM SERPL-MCNC: 3.6 MMOL/L — SIGNIFICANT CHANGE UP (ref 3.5–5.3)
POTASSIUM SERPL-SCNC: 3.6 MMOL/L — SIGNIFICANT CHANGE UP (ref 3.5–5.3)
RBC # BLD: 5.15 M/UL — SIGNIFICANT CHANGE UP (ref 3.8–5.2)
RBC # FLD: 17.7 % — HIGH (ref 10.3–14.5)
SODIUM SERPL-SCNC: 134 MMOL/L — LOW (ref 135–145)
WBC # BLD: 9.8 K/UL — SIGNIFICANT CHANGE UP (ref 3.8–10.5)
WBC # FLD AUTO: 9.8 K/UL — SIGNIFICANT CHANGE UP (ref 3.8–10.5)

## 2018-11-07 RX ORDER — AMIKACIN SULFATE 250 MG/ML
1000 INJECTION, SOLUTION INTRAMUSCULAR; INTRAVENOUS ONCE
Refills: 0 | Status: COMPLETED | OUTPATIENT
Start: 2018-11-08 | End: 2018-11-08

## 2018-11-07 RX ORDER — ALBUTEROL 90 UG/1
1 AEROSOL, METERED ORAL
Qty: 0 | Refills: 0 | DISCHARGE
Start: 2018-11-07

## 2018-11-07 RX ADMIN — Medication 81 MILLIGRAM(S): at 13:14

## 2018-11-07 RX ADMIN — Medication 40 MILLIGRAM(S): at 05:43

## 2018-11-07 RX ADMIN — Medication 175 MICROGRAM(S): at 05:43

## 2018-11-07 RX ADMIN — LACTULOSE 20 GRAM(S): 10 SOLUTION ORAL at 05:45

## 2018-11-07 RX ADMIN — AMLODIPINE BESYLATE 10 MILLIGRAM(S): 2.5 TABLET ORAL at 05:43

## 2018-11-07 RX ADMIN — ALBUTEROL 1 PUFF(S): 90 AEROSOL, METERED ORAL at 21:03

## 2018-11-07 RX ADMIN — LACTULOSE 20 GRAM(S): 10 SOLUTION ORAL at 17:24

## 2018-11-07 RX ADMIN — Medication 1 DROP(S): at 05:44

## 2018-11-07 RX ADMIN — OLANZAPINE 10 MILLIGRAM(S): 15 TABLET, FILM COATED ORAL at 13:13

## 2018-11-07 RX ADMIN — BUDESONIDE AND FORMOTEROL FUMARATE DIHYDRATE 2 PUFF(S): 160; 4.5 AEROSOL RESPIRATORY (INHALATION) at 21:04

## 2018-11-07 RX ADMIN — Medication 1 TABLET(S): at 13:13

## 2018-11-07 NOTE — PROGRESS NOTE ADULT - PROVIDER SPECIALTY LIST ADULT
Infectious Disease
Internal Medicine
Pulmonology
Pulmonology
Internal Medicine
Pulmonology
Internal Medicine
Pulmonology
Internal Medicine

## 2018-11-07 NOTE — PROGRESS NOTE ADULT - PROBLEM SELECTOR PLAN 5
C/w levothyroxine

## 2018-11-07 NOTE — PROGRESS NOTE ADULT - PROBLEM SELECTOR PLAN 3
Patient has skin changes consistent with venous stasis.  -No signs of infection.
Patient has b/l venous stasis cellulitis  -Will continue with abx
Patient has skin changes consistent with venous stasis.  -No signs of infection.
Patient has skin changes consistent with venous stasis.  -No signs of infection.

## 2018-11-07 NOTE — PROGRESS NOTE ADULT - PROBLEM SELECTOR PROBLEM 3
Venous stasis
Cellulitis
Venous stasis
Venous stasis

## 2018-11-07 NOTE — PROGRESS NOTE ADULT - SUBJECTIVE AND OBJECTIVE BOX
Patient seen and examined.     MEDICATIONS  (STANDING):  ALBUTerol    90 MICROgram(s) HFA Inhaler 1 Puff(s) Inhalation every 6 hours  amLODIPine   Tablet 10 milliGRAM(s) Oral daily  artificial tears (preservative free) Ophthalmic Solution 1 Drop(s) Both EYES two times a day  aspirin enteric coated 81 milliGRAM(s) Oral daily  buDESOnide  80 MICROgram(s)/formoterol 4.5 MICROgram(s) Inhaler 2 Puff(s) Inhalation two times a day  buDESOnide 160 MICROgram(s)/formoterol 4.5 MICROgram(s) Inhaler 2 Puff(s) Inhalation every 12 hours  furosemide    Tablet 40 milliGRAM(s) Oral daily  heparin  Injectable 5000 Unit(s) SubCutaneous every 8 hours  insulin lispro (HumaLOG) corrective regimen sliding scale   SubCutaneous three times a day before meals  lactulose Syrup 20 Gram(s) Oral two times a day  levothyroxine 175 MICROGram(s) Oral daily  multivitamin 1 Tablet(s) Oral daily  OLANZapine 10 milliGRAM(s) Oral daily  tiotropium 18 MICROgram(s) Capsule 1 Capsule(s) Inhalation daily      MEDICATIONS  (PRN):  acetaminophen   Tablet .. 650 milliGRAM(s) Oral every 6 hours PRN Temp greater or equal to 38C (100.4F)     Medications up to date at time of exam.    PHYSICAL EXAMINATION:  Patient has no new complaints.  GENERAL: The patient is a well-developed, well-nourished, in no apparent distress.     Vital Signs Last 24 Hrs  T(C): 36.3 (07 Nov 2018 05:05), Max: 36.9 (06 Nov 2018 21:35)  T(F): 97.4 (07 Nov 2018 05:05), Max: 98.4 (06 Nov 2018 21:35)  HR: 86 (07 Nov 2018 05:05) (86 - 99)  BP: 127/70 (07 Nov 2018 05:05) (113/67 - 127/70)  BP(mean): --  RR: 18 (07 Nov 2018 05:05) (17 - 18)  SpO2: 97% (07 Nov 2018 05:05) (94% - 97%)   (if applicable)    Chest Tube (if applicable)    HEENT: Head is normocephalic and atraumatic.     NECK: Supple, no palpable adenopathy.    LUNGS: Clear to auscultation, no wheezing, rales, or rhonchi.    HEART: Regular rate and rhythm without murmur.    ABDOMEN: Soft, nontender, and nondistended.      EXTREMITIES: Without any cyanosis, clubbing, rash, lesions or +B/L LE edema.    NEUROLOGIC: Awake, alert.    SKIN: Warm, dry, good turgor.    LABS:                        12.3   9.8   )-----------( 333      ( 07 Nov 2018 07:37 )             39.9     11-07    134<L>  |  98  |  14  ----------------------------<  152<H>  3.6   |  29  |  0.75    Ca    9.5      07 Nov 2018 07:37          MICROBIOLOGY: (if applicable)    RADIOLOGY & ADDITIONAL STUDIES:  EKG:   CXR:  ECHO:    IMPRESSION: 73y Female PAST MEDICAL & SURGICAL HISTORY:  Interstitial lung disease  Chronic diastolic congestive heart failure  Chronic obstructive pulmonary disease, unspecified COPD type  Hypercholesteremia  Arthritis  Hypothyroidism  Bipolar 1 disorder  Schizo affective schizophrenia  HTN (hypertension)  No significant past surgical history     Impression; 72 Y/O Female with prior mentioned multiple chronic conditions . Presented with 104 at NH  with Tachycardia 167 and altered mental status and confusion. Code Sepsis was called in ED. Has COPD with on and off hypoxia. On Medicine Unit. RVP negative.       +pseudomonas bacteremia  leukocytosis improved    Suggestion;  Continue oxygen supplementation prn to keep O2 saturation >90%.   Continue DuoNeb Q 6 hours. Budesonide twice daily.  DVT/ GI prophylactic.   Continue antibiotic as per ID recommendation, last dose  11/8/18  Aspiration precautions. Patient seen and examined.     MEDICATIONS  (STANDING):  ALBUTerol    90 MICROgram(s) HFA Inhaler 1 Puff(s) Inhalation every 6 hours  amLODIPine   Tablet 10 milliGRAM(s) Oral daily  artificial tears (preservative free) Ophthalmic Solution 1 Drop(s) Both EYES two times a day  aspirin enteric coated 81 milliGRAM(s) Oral daily  buDESOnide  80 MICROgram(s)/formoterol 4.5 MICROgram(s) Inhaler 2 Puff(s) Inhalation two times a day  buDESOnide 160 MICROgram(s)/formoterol 4.5 MICROgram(s) Inhaler 2 Puff(s) Inhalation every 12 hours  furosemide    Tablet 40 milliGRAM(s) Oral daily  heparin  Injectable 5000 Unit(s) SubCutaneous every 8 hours  insulin lispro (HumaLOG) corrective regimen sliding scale   SubCutaneous three times a day before meals  lactulose Syrup 20 Gram(s) Oral two times a day  levothyroxine 175 MICROGram(s) Oral daily  multivitamin 1 Tablet(s) Oral daily  OLANZapine 10 milliGRAM(s) Oral daily  tiotropium 18 MICROgram(s) Capsule 1 Capsule(s) Inhalation daily      MEDICATIONS  (PRN):  acetaminophen   Tablet .. 650 milliGRAM(s) Oral every 6 hours PRN Temp greater or equal to 38C (100.4F)     Medications up to date at time of exam.    PHYSICAL EXAMINATION:  Patient has no new complaints.  GENERAL: The patient is a well-developed, well-nourished, in no apparent distress.     Vital Signs Last 24 Hrs  T(C): 36.3 (07 Nov 2018 05:05), Max: 36.9 (06 Nov 2018 21:35)  T(F): 97.4 (07 Nov 2018 05:05), Max: 98.4 (06 Nov 2018 21:35)  HR: 86 (07 Nov 2018 05:05) (86 - 99)  BP: 127/70 (07 Nov 2018 05:05) (113/67 - 127/70)  BP(mean): --  RR: 18 (07 Nov 2018 05:05) (17 - 18)  SpO2: 97% (07 Nov 2018 05:05) (94% - 97%)   (if applicable)    Chest Tube (if applicable)    HEENT: Head is normocephalic and atraumatic.     NECK: Supple, no palpable adenopathy.    LUNGS: Clear to auscultation, no wheezing, rales, or rhonchi.    HEART: Regular rate and rhythm without murmur.    ABDOMEN: Soft, nontender, and nondistended.      EXTREMITIES: Without any cyanosis, clubbing, rash, lesions or +B/L LE edema.    NEUROLOGIC: Awake, alert.    SKIN: Warm, dry, good turgor.    LABS:                        12.3   9.8   )-----------( 333      ( 07 Nov 2018 07:37 )             39.9     11-07    134<L>  |  98  |  14  ----------------------------<  152<H>  3.6   |  29  |  0.75    Ca    9.5      07 Nov 2018 07:37          MICROBIOLOGY: (if applicable)    RADIOLOGY & ADDITIONAL STUDIES:  EKG:   CXR:  ECHO:    IMPRESSION: 73y Female PAST MEDICAL & SURGICAL HISTORY:  Interstitial lung disease  Chronic diastolic congestive heart failure  Chronic obstructive pulmonary disease, unspecified COPD type  Hypercholesteremia  Arthritis  Hypothyroidism  Bipolar 1 disorder  Schizo affective schizophrenia  HTN (hypertension)  No significant past surgical history     Impression; 72 Y/O Female with prior mentioned multiple chronic conditions . Presented with 104 at NH  with Tachycardia 167 and altered mental status and confusion. Code Sepsis was called in ED. Has COPD with on and off hypoxia. On Medicine Unit. RVP negative.       +pseudomonas bacteremia  leukocytosis improved    Suggestion;  Continue oxygen supplementation prn to keep O2 saturation >90%.   Continue DuoNeb Q 6 hours. Budesonide twice daily.  DVT/ GI prophylactic.   Continue antibiotic as per ID recommendation, last dose  11/8/18  Aspiration precautions.         Agree with above assessment and plan as transcribed.

## 2018-11-07 NOTE — PROGRESS NOTE ADULT - PROBLEM SELECTOR PROBLEM 2
UTI (urinary tract infection)

## 2018-11-07 NOTE — PROGRESS NOTE ADULT - SUBJECTIVE AND OBJECTIVE BOX
PGY 1 Note discussed with supervising resident and primary attending    Patient is a 73y old  Female who presents with a chief complaint of Fever (05 Nov 2018 14:55)      INTERVAL HPI/OVERNIGHT EVENTS: no events noted overnight.    MEDICATIONS  (STANDING):  ALBUTerol    90 MICROgram(s) HFA Inhaler 1 Puff(s) Inhalation every 6 hours  amiKACIN  IVPB 1000 milliGRAM(s) IV Intermittent daily  amLODIPine   Tablet 10 milliGRAM(s) Oral daily  artificial tears (preservative free) Ophthalmic Solution 1 Drop(s) Both EYES two times a day  aspirin enteric coated 81 milliGRAM(s) Oral daily  buDESOnide  80 MICROgram(s)/formoterol 4.5 MICROgram(s) Inhaler 2 Puff(s) Inhalation two times a day  buDESOnide 160 MICROgram(s)/formoterol 4.5 MICROgram(s) Inhaler 2 Puff(s) Inhalation every 12 hours  furosemide    Tablet 40 milliGRAM(s) Oral daily  heparin  Injectable 5000 Unit(s) SubCutaneous every 8 hours  insulin lispro (HumaLOG) corrective regimen sliding scale   SubCutaneous three times a day before meals  lactulose Syrup 20 Gram(s) Oral two times a day  levothyroxine 175 MICROGram(s) Oral daily  multivitamin 1 Tablet(s) Oral daily  OLANZapine 10 milliGRAM(s) Oral daily  tiotropium 18 MICROgram(s) Capsule 1 Capsule(s) Inhalation daily    MEDICATIONS  (PRN):  acetaminophen   Tablet .. 650 milliGRAM(s) Oral every 6 hours PRN Temp greater or equal to 38C (100.4F)      __________________________________________________  REVIEW OF SYSTEMS:   Denies nausea, vomiting, diarrhea, abdominal pain, chest pain, palpitations, dizziness, headache, cough, wheezing, joint pain or swelling, fever, chills.   D      Vital Signs Last 24 Hrs  T(C): 36.3 (07 Nov 2018 05:05), Max: 36.9 (06 Nov 2018 21:35)  T(F): 97.4 (07 Nov 2018 05:05), Max: 98.4 (06 Nov 2018 21:35)  HR: 86 (07 Nov 2018 05:05) (86 - 99)  BP: 127/70 (07 Nov 2018 05:05) (113/67 - 127/70)  BP(mean): --  RR: 18 (07 Nov 2018 05:05) (17 - 18)  SpO2: 97% (07 Nov 2018 05:05) (94% - 97%)    ________________________________________________  PHYSICAL EXAM:  GENERAL: NAD  HEENT: Normocephalic;  conjunctivae and sclerae clear; moist mucous membranes;   NECK : supple  CHEST/LUNG: Clear to auscultation bilaterally with good air entry   HEART: S1 S2  regular; no murmurs, gallops or rubs  ABDOMEN: Soft, Nontender, Nondistended; Bowel sounds present  EXTREMITIES: no cyanosis; no edema; no calf tenderness  SKIN: warm and dry; no rash  NERVOUS SYSTEM:  Awake and alert; Oriented  to place, person and time ; no new deficits    _________________________________________________  LABS:                        11.8   10.0  )-----------( 335      ( 06 Nov 2018 07:21 )             37.8     11-06    134<L>  |  99  |  20<H>  ----------------------------<  154<H>  3.7   |  26  |  0.93    Ca    9.3      06 Nov 2018 07:21          CAPILLARY BLOOD GLUCOSE      POCT Blood Glucose.: 166 mg/dL (06 Nov 2018 22:19)  POCT Blood Glucose.: 162 mg/dL (06 Nov 2018 11:29)  POCT Blood Glucose.: 155 mg/dL (06 Nov 2018 08:19)

## 2018-11-07 NOTE — PROGRESS NOTE ADULT - PROBLEM SELECTOR PLAN 4
HbA1C- 6.5  -HSS

## 2018-11-07 NOTE — PROGRESS NOTE ADULT - PROBLEM SELECTOR PLAN 2
-Patient has UA  mildly positive  -Ucx: Klebsiella, could be colonized  -ID Dr Aparicio
-Ucx: Klebsiella, could be colonized  -ID Dr Aparicio
-Patient has UA  mildly positive  -Ucx: Klebsiella, could be colonized  -ID Dr Aparicio
-Patient has UA positive  -Ucx: Klebsiella  - Refused IV antibiotics  -ID Dr Aparicio
-Ucx: Klebsiella, could be colonized  -ID Dr Aparicio
-Ucx: Klebsiella, could be colonized  -ID Dr Aparicio
-Patient has UA  mildly positive  -Ucx: Klebsiella, could be colonized  -ID Dr Aparicio

## 2018-11-07 NOTE — PROGRESS NOTE ADULT - PROBLEM SELECTOR PLAN 6
Will start on heparin for DVT ppx

## 2018-11-07 NOTE — PROGRESS NOTE ADULT - ASSESSMENT
Patient is s/p sepsis in ED, currently afebrile. Blood cultures and urine cultures are positive. Treating her with IV antibiotics.
Patient is s/p sepsis in ED, currently afebrile. Repeat blood cultures on oct 29 negative. Treating her with IV antibiotics.
Patient is s/p sepsis in ED, currently afebrile. Blood cultures and urine cultures are positive. Treating her with IV antibiotics.
Bacteremia - with Carbapenem resistant pseudomonas    plan - cont Amikacin 1000mgs iv q24 hrs till Thursday am dose then dc back to NH( today is D#7)  Daily BMP

## 2018-11-08 VITALS
SYSTOLIC BLOOD PRESSURE: 121 MMHG | DIASTOLIC BLOOD PRESSURE: 71 MMHG | HEART RATE: 95 BPM | OXYGEN SATURATION: 98 % | TEMPERATURE: 98 F | RESPIRATION RATE: 17 BRPM

## 2018-11-08 LAB
ANION GAP SERPL CALC-SCNC: 11 MMOL/L — SIGNIFICANT CHANGE UP (ref 5–17)
BUN SERPL-MCNC: 14 MG/DL — SIGNIFICANT CHANGE UP (ref 7–18)
CALCIUM SERPL-MCNC: 9.1 MG/DL — SIGNIFICANT CHANGE UP (ref 8.4–10.5)
CHLORIDE SERPL-SCNC: 95 MMOL/L — LOW (ref 96–108)
CO2 SERPL-SCNC: 27 MMOL/L — SIGNIFICANT CHANGE UP (ref 22–31)
CREAT SERPL-MCNC: 0.7 MG/DL — SIGNIFICANT CHANGE UP (ref 0.5–1.3)
GLUCOSE BLDC GLUCOMTR-MCNC: 144 MG/DL — HIGH (ref 70–99)
GLUCOSE BLDC GLUCOMTR-MCNC: 172 MG/DL — HIGH (ref 70–99)
GLUCOSE SERPL-MCNC: 144 MG/DL — HIGH (ref 70–99)
HCT VFR BLD CALC: 38.1 % — SIGNIFICANT CHANGE UP (ref 34.5–45)
HGB BLD-MCNC: 12.2 G/DL — SIGNIFICANT CHANGE UP (ref 11.5–15.5)
MCHC RBC-ENTMCNC: 24.6 PG — LOW (ref 27–34)
MCHC RBC-ENTMCNC: 31.8 GM/DL — LOW (ref 32–36)
MCV RBC AUTO: 77.3 FL — LOW (ref 80–100)
PLATELET # BLD AUTO: 301 K/UL — SIGNIFICANT CHANGE UP (ref 150–400)
POTASSIUM SERPL-MCNC: 3.1 MMOL/L — LOW (ref 3.5–5.3)
POTASSIUM SERPL-SCNC: 3.1 MMOL/L — LOW (ref 3.5–5.3)
RBC # BLD: 4.94 M/UL — SIGNIFICANT CHANGE UP (ref 3.8–5.2)
RBC # FLD: 17.3 % — HIGH (ref 10.3–14.5)
SODIUM SERPL-SCNC: 133 MMOL/L — LOW (ref 135–145)
WBC # BLD: 9.5 K/UL — SIGNIFICANT CHANGE UP (ref 3.8–10.5)
WBC # FLD AUTO: 9.5 K/UL — SIGNIFICANT CHANGE UP (ref 3.8–10.5)

## 2018-11-08 RX ORDER — POTASSIUM CHLORIDE 20 MEQ
40 PACKET (EA) ORAL ONCE
Refills: 0 | Status: COMPLETED | OUTPATIENT
Start: 2018-11-08 | End: 2018-11-08

## 2018-11-08 RX ADMIN — OLANZAPINE 10 MILLIGRAM(S): 15 TABLET, FILM COATED ORAL at 12:07

## 2018-11-08 RX ADMIN — Medication 40 MILLIEQUIVALENT(S): at 12:07

## 2018-11-08 RX ADMIN — Medication 40 MILLIGRAM(S): at 05:34

## 2018-11-08 RX ADMIN — AMLODIPINE BESYLATE 10 MILLIGRAM(S): 2.5 TABLET ORAL at 05:34

## 2018-11-08 RX ADMIN — Medication 175 MICROGRAM(S): at 05:34

## 2018-11-08 RX ADMIN — Medication 81 MILLIGRAM(S): at 12:06

## 2018-11-08 RX ADMIN — Medication 1 TABLET(S): at 12:07

## 2018-11-27 PROCEDURE — 81001 URINALYSIS AUTO W/SCOPE: CPT

## 2018-11-27 PROCEDURE — 87581 M.PNEUMON DNA AMP PROBE: CPT

## 2018-11-27 PROCEDURE — 93005 ELECTROCARDIOGRAM TRACING: CPT

## 2018-11-27 PROCEDURE — 82306 VITAMIN D 25 HYDROXY: CPT

## 2018-11-27 PROCEDURE — 82962 GLUCOSE BLOOD TEST: CPT

## 2018-11-27 PROCEDURE — 85027 COMPLETE CBC AUTOMATED: CPT

## 2018-11-27 PROCEDURE — 83036 HEMOGLOBIN GLYCOSYLATED A1C: CPT

## 2018-11-27 PROCEDURE — 84443 ASSAY THYROID STIM HORMONE: CPT

## 2018-11-27 PROCEDURE — 80048 BASIC METABOLIC PNL TOTAL CA: CPT

## 2018-11-27 PROCEDURE — 94640 AIRWAY INHALATION TREATMENT: CPT

## 2018-11-27 PROCEDURE — 87186 SC STD MICRODIL/AGAR DIL: CPT

## 2018-11-27 PROCEDURE — 82607 VITAMIN B-12: CPT

## 2018-11-27 PROCEDURE — 87486 CHLMYD PNEUM DNA AMP PROBE: CPT

## 2018-11-27 PROCEDURE — 99285 EMERGENCY DEPT VISIT HI MDM: CPT | Mod: 25

## 2018-11-27 PROCEDURE — 87150 DNA/RNA AMPLIFIED PROBE: CPT

## 2018-11-27 PROCEDURE — 71045 X-RAY EXAM CHEST 1 VIEW: CPT

## 2018-11-27 PROCEDURE — 87184 SC STD DISK METHOD PER PLATE: CPT

## 2018-11-27 PROCEDURE — 87086 URINE CULTURE/COLONY COUNT: CPT

## 2018-11-27 PROCEDURE — 87798 DETECT AGENT NOS DNA AMP: CPT

## 2018-11-27 PROCEDURE — 87040 BLOOD CULTURE FOR BACTERIA: CPT

## 2018-11-27 PROCEDURE — 80061 LIPID PANEL: CPT

## 2018-11-27 PROCEDURE — 97116 GAIT TRAINING THERAPY: CPT

## 2018-11-27 PROCEDURE — 85730 THROMBOPLASTIN TIME PARTIAL: CPT

## 2018-11-27 PROCEDURE — 97162 PT EVAL MOD COMPLEX 30 MIN: CPT

## 2018-11-27 PROCEDURE — 85610 PROTHROMBIN TIME: CPT

## 2018-11-27 PROCEDURE — 80053 COMPREHEN METABOLIC PANEL: CPT

## 2018-11-27 PROCEDURE — 83605 ASSAY OF LACTIC ACID: CPT

## 2018-11-27 PROCEDURE — 87633 RESP VIRUS 12-25 TARGETS: CPT

## 2019-10-06 NOTE — ED ADULT TRIAGE NOTE - HEART RATE (BEATS/MIN)
PULMONARY  CONSULT NOTE    Physician performing consultation: Ras Vogt MD      I was asked to see Josy Matthews at the request of Shayan Barone MD  for Pulmonary consultation.    Reason for Consult:  Recurrent right pleural effusion    History of Present Illness: Patient is a 87 year old female with CHF and recurrent transudative right pleural effusions who was admitted with chest discomfort and heaviness which started yesterday.  Increased dyspnea at rest and with activity.  Pain radiated to her neck.  No nausea or vomiting.  No relief from nitro at home.  Given ASA and NTG en route to hospital.  She denies abd pain, significantly darker stools, bloody stools, any other sx, or eating prior to sx onset. She states a Hx of stent placement and CHF. She denies any hx of PE/ DVT, or any recent falls or injuries. There are no further complaints or modifying factors at this time.    ED workup revealed labs that were fairly unremarkable.  BNP slightly elevated.  CXR with recurrent right pleural effusion.  She is admitted for further work-up and I am asked to assist in her care.    Assessment:  Recurrent right pleural effusion   --s/p 8/13 thoracentesis 1.1L removed, transudative   --s/p 9/12 thoracentesis 1.25L  --s/p 9/13 thoracentesis 600 cc   Acute on Chronic diastolic heart failure   Hx right sided breast cancer, 1996  Hx asthma     Plan:  Monitor respiratory status. On 2lpm O2 for comfort per RT report.   Continue bronchodilators.  We discussed her numerous recent thoracentesis  Maintain heparin drip and can hold before procedure.  No leukocytosis or evidence of pneumonia on CXR.   Would recommend pleurX catheter placement at this time as fluid will continue to re-accumulate until CHF better controlled.   Recommend continuing aggressive diusesis and management of CHF.  Discussed above plan with Dr Barone    Discussed with or notes reviewed:  RN, Patient and MD    Patient Active Problem List    Diagnosis   • Hip joint replacement by other means   • Failed back surgical syndrome   • Chest pain   • Left upper extremity numbness   • Hypertensive urgency   • Chronic kidney disease, stage III (moderate) (CMS/Formerly [facility])   • SHARMILA (acute kidney injury) (CMS/Formerly [facility])   • Stage 3 chronic kidney disease (CMS/Formerly [facility])   • Chronic diastolic heart failure (CMS/Formerly [facility])   • Hyperkalemia   • Bradycardia   • Chronic hip pain, right   • Coronary artery disease involving native coronary artery of native heart without angina pectoris   • Type 2 diabetes mellitus without complication (CMS/Formerly [facility])   • History of breast cancer   • Debility   • Elevated LFTs   • Altered mental state   • Essential hypertension, benign   • Anxiety and depression   • Anemia of chronic disease   • CAST (dyspnea on exertion)   • Essential hypertension, benign   • Stage 3 chronic kidney disease (CMS/[facility])   • Anemia of chronic disease   • Chronic diastolic HF (heart failure) (CMS/Formerly [facility])   • Right sided weakness   • Status post CVA   • Generalized weakness   • Impaired mobility   • Pulmonary hypertension (CMS/[facility])   • Blood in stool   • Acute on chronic anemia   • Dehydration   • Renal insufficiency   • Fall   • Anemia in stage 4 chronic kidney disease (CMS/[facility])   • CKD (chronic kidney disease) stage 4, GFR 15-29 ml/min (CMS/[facility])   • Acute on chronic heart failure (CMS/[facility]ly [facility])   • Recurrent pleural effusion on right   • Elevated blood pressure reading        ALLERGIES:   Allergen Reactions   • Amlodipine SWELLING   • Cymbalta [Duloxetine Hcl] RASH and Other (See Comments)   • Glipizide Other (See Comments)   • Penicillins SWELLING   • Talwin RASH and Other (See Comments)   • Tegretol VOMITING and DIARRHEA   • Zithromax [Azithromycin] RASH and Other (See Comments)       Scheduled Medications:  • atorvastatin  80 mg Oral Nightly   • doxazosin  4 mg Oral Nightly   • ferrous sulfate  325 mg Oral Daily with breakfast   • fluticasone  2 spray Each Nare BID   • fluticasone-vilanterol  1  puff Inhalation Daily Resp   • folic acid  800 mcg Oral Daily   • furosemide  80 mg Oral Daily   • gabapentin  200 mg Oral QAM   • gabapentin  100 mg Oral QHS   • hydrALAZINE  100 mg Oral 4 times per day   • isosorbide mononitrate  60 mg Oral Daily   • latanoprost  1 drop Both Eyes Nightly   • metoPROLOL tartrate  25 mg Oral 2 times per day   • NIFEdipine XL  90 mg Oral Daily   • pantoprazole  40 mg Oral Nightly   • ranolazine  500 mg Oral 2 times per day   • senna  1 tablet Oral Daily   • sertraline  200 mg Oral Daily   • sodium chloride (PF)  2 mL Intracatheter 2 times per day   • aspirin  81 mg Oral Daily       Infusions:  • heparin (porcine) 25,000 units/250 mL in dextrose 5 % infusion 12 Units/kg/hr (10/06/19 0144)       Past Medical History:   Diagnosis Date   • Anemia    • Anxiety    • Anxiety and depression    • Arthritis    • Breast cancer (CMS/HCC)     1996 right side   • Chronic diastolic heart failure (CMS/HCC) 11/11/2017   • Chronic kidney disease, stage III (moderate) (CMS/HCC)    • Chronic pain     Right hip and lower back. Patient had rods placed   • Coronary artery disease    • Gastroesophageal reflux disease    • Gout    • High cholesterol    • Malignant neoplasm (CMS/HCC)    • Pneumonia    • RAD (reactive airway disease)    • Sinusitis, chronic    • Status post CVA 12/23/2017   • Type II or unspecified type diabetes mellitus without mention of complication, not stated as uncontrolled    • Unspecified essential hypertension    • Urinary incontinence         Past Surgical History:   Procedure Laterality Date   • Appendectomy     • Back surgery     • Breast surgery      lumpectomy- rt breast   • Cardiac catherization     • Carpal tunnel release     • Eye surgery      cataract both eyes   • Hysterectomy     • Joint replacement      hip replacement- right   • Mastectomy partial      Mastectomy, partial   • Spine surgery procedure unlisted      Unspecified spine procedure   • Total hip replacement   11-    Hip replacement RT hip        Social History     Tobacco Use   • Smoking status: Never Smoker   • Smokeless tobacco: Never Used   Substance Use Topics   • Alcohol use: No     Frequency: Never     Drinks per session: 1 or 2     Binge frequency: Never   • Drug use: No       Family History   Problem Relation Age of Onset   • Heart Brother    • Early death Brother    • Heart Father    • Stroke Father    • Cancer Maternal Aunt    • Stroke Mother    • Hypertension Mother    • Cancer Paternal Aunt    • Cancer Brother         lung cancer   • Heart Maternal Grandmother    • Diabetes Maternal Grandfather        Review of Systems:  14 point review of systems has been discussed with the patient and pertinent positives are noted in the history of present illness otherwise remainder are negative.     Objective:    Physical Exam:  Visit Vitals  /50 (BP Location: RUE - Right upper extremity, Patient Position: Sitting)   Pulse 70   Temp 99.2 °F (37.3 °C) (Oral)   Resp 16   Ht 5' 4\" (1.626 m)   Wt 70 kg   SpO2 97%   BMI 26.49 kg/m²   GENERAL: alert, is in no apparent distress, 2 lpm O2,  female   SKIN: normal color, warm, dry   HEAD: normocephalic, atraumatic  EYES: pupils are equal, sclera and conjunctiva are normal  MOUTH/THROAT: oropharynx appears normal, oral mucosa is unremarkable and gums and teeth appear normal  NECK: supple, trachea midline, no JVD  CHEST: respiratory effort is not labored, equal expansion bilaterally. No pursed lip breathing.  LUNGS: clear to left, crackles and dullness to right base, no wheezing  HEART: normal rate and rhythm, S1 and S2 normal, no murmur  ABDOMEN: soft, non-tender, non-distended, normoactive bowel sounds  NEUROLOGIC: Awake, alert, oriented. Moves all four extremities, no focal deficits  EXTREMITIES: no clubbing, no cyanosis,trace edema. Pulses equal bilaterally  PSYCHIATRIC: Calm/mildly anxious cooperative, answers questions appropriately.     Laboratory  Results:  Recent Labs     10/05/19  1710 10/06/19  0547   SODIUM 138 139   POTASSIUM 4.1 3.9   CHLORIDE 102 103   CO2 30 29   BUN 36* 34*   CREATININE 2.76* 2.60*   GLUCOSE 98 97   WBC 7.3  --    HGB 12.8  --      --    INR 1.3  --        Lab Results   Component Value Date    SODIUM 139 10/06/2019    POTASSIUM 3.9 10/06/2019    BUN 34 (H) 10/06/2019    CREATININE 2.60 (H) 10/06/2019    WBC 7.3 10/05/2019    HCT 41.2 10/05/2019    HGB 12.8 10/05/2019    INR 1.3 10/05/2019     (H) 02/13/2018    MMB 1.2 03/30/2015    RAPDTR 1.50 (HH) 10/06/2019    PTT 59 (H) 10/06/2019    GLUCOSE 97 10/06/2019    TSH 2.040 06/27/2018     (H) 06/24/2018    CHLORIDE 103 10/06/2019    PT 13.5 (H) 10/05/2019    CO2 29 10/06/2019       Imaging:  CXR reviewed    Rhythm: Sinus Rhythm             81

## 2019-10-07 NOTE — ED ADULT NURSE NOTE - NSIMPLEMENTINTERV_GEN_ALL_ED
07-Oct-2019 11:27
Implemented All Fall Risk Interventions:  [place] to call system. Call bell, personal items and telephone within reach. Instruct patient to call for assistance. Room bathroom lighting operational. Non-slip footwear when patient is off stretcher. Physically safe environment: no spills, clutter or unnecessary equipment. Stretcher in lowest position, wheels locked, appropriate side rails in place. Provide visual cue, wrist band, yellow gown, etc. Monitor gait and stability. Monitor for mental status changes and reorient to person, place, and time. Review medications for side effects contributing to fall risk. Reinforce activity limits and safety measures with patient and family.

## 2020-02-07 NOTE — PATIENT PROFILE ADULT - HARM RISK FACTORS
----- Message from ISAIAH mSith sent at 2/7/2020  8:14 AM CST -----  Normal this test was for myasthenia gravis which causes muscle fatigue it was negative    (MUSK ANTIBODY TEST)   yes

## 2020-02-20 NOTE — DISCHARGE NOTE ADULT - CONTRAINDICATIONS & PRECAUTIONS (SELECT ALL THAT APPLY)
Patient/surrogate refused vaccine... Ear Star Wedge Flap Text: The defect edges were debeveled with a #15 blade scalpel.  Given the location of the defect and the proximity to free margins (helical rim) an ear star wedge flap was deemed most appropriate.  Using a sterile surgical marker, the appropriate flap was drawn incorporating the defect and placing the expected incisions between the helical rim and antihelix where possible.  The area thus outlined was incised through and through with a #15 scalpel blade.

## 2020-05-26 NOTE — ED PROVIDER NOTE - SCRIBE NAME
Cabrera De La Torre is here for Initial evaluation of potential COVID-19 exposure.        You will need to schedule a lab appointment to be tested for COVID-19.  Your lab appointment was scheduled during today's call.  You are to remain off work and out of public places except to seek medical care, and complete the symptom tracker daily on [facility].  YOU MAY NOT RETURN TO WORK WITHOUT CLEARANCE FROM EMPLOYEE [employer].    Off work start date: 05/26/20  Off work end date: 06/05/20            
Bry May)

## 2022-06-27 NOTE — ED PROVIDER NOTE - CPE EDP CARDIAC NORM
Plastic Surgeon Procedure Text (A): After obtaining clear surgical margins the patient was sent to plastics for surgical repair.  The patient understands they will receive post-surgical care and follow-up from the referring physician's office. normal...

## 2022-10-10 NOTE — DIETITIAN INITIAL EVALUATION ADULT. - PROBLEM SELECTOR PROBLEM 7
This note was copied from a baby's chart.  Follow-up lactation consult with Kenna and baby girl [facility] on day of discharge. Breastfeeding continues to go really well.  Writer observed feeding and despite significant tongue tie, Be latched well with gulping present.  Praise given.  Jacquelin reports filling to breasts and Be's weight up from day prior.  Reviewed expectations for next few days as milk transitions to full supply including engorgement management and ways to assure and know infant is getting enough at breast. Answered questions. See Infant Education Record for further education reviewed at this time. Encouraged to call as needed for further visits or with questions/concerns.     Diann Cline, RN, IBCLC    Total time spent in 1:1 consultation: 15 minutes.     Hypothyroidism

## 2022-11-07 NOTE — PATIENT PROFILE ADULT - NSPROCHRONICPAIN_GEN_A_NUR
no Vtama Pregnancy And Lactation Text: It is unknown if this medication can cause problems during pregnancy and breastfeeding.

## 2022-12-23 ENCOUNTER — INPATIENT (INPATIENT)
Facility: HOSPITAL | Age: 77
LOS: 12 days | Discharge: NURSING, REHAB OR LONG-TERM CARE | DRG: 871 | End: 2023-01-05
Attending: INTERNAL MEDICINE | Admitting: INTERNAL MEDICINE
Payer: MEDICARE

## 2022-12-23 VITALS
HEART RATE: 120 BPM | TEMPERATURE: 98 F | RESPIRATION RATE: 18 BRPM | WEIGHT: 205.91 LBS | OXYGEN SATURATION: 97 % | DIASTOLIC BLOOD PRESSURE: 69 MMHG | SYSTOLIC BLOOD PRESSURE: 140 MMHG

## 2022-12-23 DIAGNOSIS — J44.1 CHRONIC OBSTRUCTIVE PULMONARY DISEASE WITH (ACUTE) EXACERBATION: ICD-10-CM

## 2022-12-23 LAB
ALBUMIN SERPL ELPH-MCNC: 2.5 G/DL — LOW (ref 3.5–5)
ALP SERPL-CCNC: 73 U/L — SIGNIFICANT CHANGE UP (ref 40–120)
ALT FLD-CCNC: 14 U/L DA — SIGNIFICANT CHANGE UP (ref 10–60)
ANION GAP SERPL CALC-SCNC: 7 MMOL/L — SIGNIFICANT CHANGE UP (ref 5–17)
AST SERPL-CCNC: 14 U/L — SIGNIFICANT CHANGE UP (ref 10–40)
BASOPHILS # BLD AUTO: 0.02 K/UL — SIGNIFICANT CHANGE UP (ref 0–0.2)
BASOPHILS NFR BLD AUTO: 0.3 % — SIGNIFICANT CHANGE UP (ref 0–2)
BILIRUB SERPL-MCNC: 0.4 MG/DL — SIGNIFICANT CHANGE UP (ref 0.2–1.2)
BUN SERPL-MCNC: 18 MG/DL — SIGNIFICANT CHANGE UP (ref 7–18)
CALCIUM SERPL-MCNC: 9 MG/DL — SIGNIFICANT CHANGE UP (ref 8.4–10.5)
CHLORIDE SERPL-SCNC: 106 MMOL/L — SIGNIFICANT CHANGE UP (ref 96–108)
CO2 SERPL-SCNC: 28 MMOL/L — SIGNIFICANT CHANGE UP (ref 22–31)
CREAT SERPL-MCNC: 0.78 MG/DL — SIGNIFICANT CHANGE UP (ref 0.5–1.3)
EGFR: 79 ML/MIN/1.73M2 — SIGNIFICANT CHANGE UP
EOSINOPHIL # BLD AUTO: 0 K/UL — SIGNIFICANT CHANGE UP (ref 0–0.5)
EOSINOPHIL NFR BLD AUTO: 0 % — SIGNIFICANT CHANGE UP (ref 0–6)
FLUAV AG NPH QL: SIGNIFICANT CHANGE UP
FLUBV AG NPH QL: SIGNIFICANT CHANGE UP
GLUCOSE BLDC GLUCOMTR-MCNC: 163 MG/DL — HIGH (ref 70–99)
GLUCOSE SERPL-MCNC: 155 MG/DL — HIGH (ref 70–99)
HCT VFR BLD CALC: 38.2 % — SIGNIFICANT CHANGE UP (ref 34.5–45)
HGB BLD-MCNC: 12.1 G/DL — SIGNIFICANT CHANGE UP (ref 11.5–15.5)
IMM GRANULOCYTES NFR BLD AUTO: 0.3 % — SIGNIFICANT CHANGE UP (ref 0–0.9)
LYMPHOCYTES # BLD AUTO: 0.93 K/UL — LOW (ref 1–3.3)
LYMPHOCYTES # BLD AUTO: 12 % — LOW (ref 13–44)
MCHC RBC-ENTMCNC: 25.6 PG — LOW (ref 27–34)
MCHC RBC-ENTMCNC: 31.7 GM/DL — LOW (ref 32–36)
MCV RBC AUTO: 80.9 FL — SIGNIFICANT CHANGE UP (ref 80–100)
MONOCYTES # BLD AUTO: 0.5 K/UL — SIGNIFICANT CHANGE UP (ref 0–0.9)
MONOCYTES NFR BLD AUTO: 6.5 % — SIGNIFICANT CHANGE UP (ref 2–14)
NEUTROPHILS # BLD AUTO: 6.26 K/UL — SIGNIFICANT CHANGE UP (ref 1.8–7.4)
NEUTROPHILS NFR BLD AUTO: 80.9 % — HIGH (ref 43–77)
NRBC # BLD: 0 /100 WBCS — SIGNIFICANT CHANGE UP (ref 0–0)
PLATELET # BLD AUTO: 203 K/UL — SIGNIFICANT CHANGE UP (ref 150–400)
POTASSIUM SERPL-MCNC: 3.9 MMOL/L — SIGNIFICANT CHANGE UP (ref 3.5–5.3)
POTASSIUM SERPL-SCNC: 3.9 MMOL/L — SIGNIFICANT CHANGE UP (ref 3.5–5.3)
PROT SERPL-MCNC: 7.1 G/DL — SIGNIFICANT CHANGE UP (ref 6–8.3)
RBC # BLD: 4.72 M/UL — SIGNIFICANT CHANGE UP (ref 3.8–5.2)
RBC # FLD: 17.1 % — HIGH (ref 10.3–14.5)
SARS-COV-2 RNA SPEC QL NAA+PROBE: SIGNIFICANT CHANGE UP
SODIUM SERPL-SCNC: 141 MMOL/L — SIGNIFICANT CHANGE UP (ref 135–145)
TROPONIN I, HIGH SENSITIVITY RESULT: 43.3 NG/L — SIGNIFICANT CHANGE UP
WBC # BLD: 7.73 K/UL — SIGNIFICANT CHANGE UP (ref 3.8–10.5)
WBC # FLD AUTO: 7.73 K/UL — SIGNIFICANT CHANGE UP (ref 3.8–10.5)

## 2022-12-23 PROCEDURE — 70450 CT HEAD/BRAIN W/O DYE: CPT | Mod: 26,MG

## 2022-12-23 PROCEDURE — 93010 ELECTROCARDIOGRAM REPORT: CPT

## 2022-12-23 PROCEDURE — 99285 EMERGENCY DEPT VISIT HI MDM: CPT

## 2022-12-23 PROCEDURE — G1004: CPT

## 2022-12-23 PROCEDURE — 71045 X-RAY EXAM CHEST 1 VIEW: CPT | Mod: 26

## 2022-12-23 RX ORDER — AMPICILLIN SODIUM AND SULBACTAM SODIUM 250; 125 MG/ML; MG/ML
3 INJECTION, POWDER, FOR SUSPENSION INTRAMUSCULAR; INTRAVENOUS ONCE
Refills: 0 | Status: COMPLETED | OUTPATIENT
Start: 2022-12-23 | End: 2022-12-23

## 2022-12-23 RX ORDER — ACETAMINOPHEN 500 MG
650 TABLET ORAL EVERY 6 HOURS
Refills: 0 | Status: DISCONTINUED | OUTPATIENT
Start: 2022-12-23 | End: 2023-01-05

## 2022-12-23 RX ORDER — IPRATROPIUM/ALBUTEROL SULFATE 18-103MCG
3 AEROSOL WITH ADAPTER (GRAM) INHALATION
Refills: 0 | Status: COMPLETED | OUTPATIENT
Start: 2022-12-23 | End: 2022-12-23

## 2022-12-23 RX ORDER — IPRATROPIUM/ALBUTEROL SULFATE 18-103MCG
3 AEROSOL WITH ADAPTER (GRAM) INHALATION ONCE
Refills: 0 | Status: COMPLETED | OUTPATIENT
Start: 2022-12-23 | End: 2022-12-23

## 2022-12-23 RX ORDER — INSULIN LISPRO 100/ML
VIAL (ML) SUBCUTANEOUS
Refills: 0 | Status: DISCONTINUED | OUTPATIENT
Start: 2022-12-23 | End: 2023-01-05

## 2022-12-23 RX ORDER — INSULIN LISPRO 100/ML
VIAL (ML) SUBCUTANEOUS AT BEDTIME
Refills: 0 | Status: DISCONTINUED | OUTPATIENT
Start: 2022-12-23 | End: 2023-01-05

## 2022-12-23 RX ADMIN — Medication 3 MILLILITER(S): at 19:54

## 2022-12-23 RX ADMIN — AMPICILLIN SODIUM AND SULBACTAM SODIUM 200 GRAM(S): 250; 125 INJECTION, POWDER, FOR SUSPENSION INTRAMUSCULAR; INTRAVENOUS at 23:07

## 2022-12-23 RX ADMIN — Medication 3 MILLILITER(S): at 20:41

## 2022-12-23 RX ADMIN — Medication 125 MILLIGRAM(S): at 19:53

## 2022-12-23 RX ADMIN — Medication 3 MILLILITER(S): at 19:53

## 2022-12-23 RX ADMIN — Medication 3 MILLILITER(S): at 20:37

## 2022-12-23 NOTE — ED PROVIDER NOTE - NSICDXPASTMEDICALHX_GEN_ALL_CORE_FT
PAST MEDICAL HISTORY:  Arthritis     Bipolar 1 disorder     Chronic diastolic congestive heart failure     Chronic obstructive pulmonary disease, unspecified COPD type     HTN (hypertension)     Hypercholesteremia     Hypothyroidism     Interstitial lung disease     Schizo affective schizophrenia       Poor historian

## 2022-12-23 NOTE — ED PROVIDER NOTE - CARDIAC, MLM
Patient advised of results, appt made for patient Normal rate, regular rhythm.  Heart sounds S1, S2.  No murmurs, rubs or gallops.

## 2022-12-23 NOTE — ED PROVIDER NOTE - CROS ED NEURO NEG
Patient reported that she was seen at [facility] ED and was instructed to take 50 mg of prednisone x 5 days, for swollen left knee and a pain rating of 9/10. She is currently on day three of prednisone dose. Patient was given tramadol 50 mg tablets to use every six hours as needed for pain. Patient stated that she feels improvement but, her concern is if it is okay to continue prednisone as she has upcoming echocardiogram scheduled 8/24/17. Patient has a PFT and MRI (of left knee) appointment as well on 8/24/17. Writer will route to provider to review and advise.   Rahel Freedman LPN     no loss of consciousness/no change in level of consciousness

## 2022-12-23 NOTE — ED ADULT TRIAGE NOTE - PATIENT ON (OXYGEN DELIVERY METHOD)
Anesthesia Evaluation     history of anesthetic complications: PONV               Airway   Mallampati: II  TM distance: >3 FB  Neck ROM: full  no difficulty expected  Dental - normal exam     Pulmonary - normal exam   (+) asthma,  (-) decreased breath sounds, wheezes  Cardiovascular - normal exam        Neuro/Psych  GI/Hepatic/Renal/Endo    (+)  GERD, GI bleeding ,     Musculoskeletal     Abdominal  - normal exam   Substance History      OB/GYN          Other                        Anesthesia Plan    ASA 2     MAC     intravenous induction     Anesthetic plan, all risks, benefits, and alternatives have been provided, discussed and informed consent has been obtained with: patient.        CODE STATUS:        room air

## 2022-12-23 NOTE — ED PROVIDER NOTE - OBJECTIVE STATEMENT
76 y/o female, history of hypertension, hypothyroidism, CHF, COPD, interstitial lung disease, bipolar, schizoaffective, comes in following a fall. Patient poor historian. Says she was in her wheelchair and fell out of the wheelchair hitting her head. Denies LOC; denies any pain at this time. However, patient cannot tell me why she fell out of the chair. Of note, patient noted to be tachycardic and also complaining of shortness of breath. Patient speaking in full sentences. Patient is allergic to Avelox and moxifloxacin.

## 2022-12-23 NOTE — ED PROVIDER NOTE - PHYSICAL EXAMINATION
Respiratory: poor inspiratory effort w/ wheezing bilaterally     Head: contusion to frontal scalp     Belly/Musculoskeletal: belly soft, moving all extremities Respiratory: poor inspiratory effort w/ wheezing bilaterally     Head: contusion to frontal scalp     Belly/Musculoskeletal: belly soft, moving all extremities  + b/l LE cellulitis

## 2022-12-24 DIAGNOSIS — I50.9 HEART FAILURE, UNSPECIFIED: ICD-10-CM

## 2022-12-24 DIAGNOSIS — Z29.9 ENCOUNTER FOR PROPHYLACTIC MEASURES, UNSPECIFIED: ICD-10-CM

## 2022-12-24 DIAGNOSIS — E11.9 TYPE 2 DIABETES MELLITUS WITHOUT COMPLICATIONS: ICD-10-CM

## 2022-12-24 DIAGNOSIS — A41.9 SEPSIS, UNSPECIFIED ORGANISM: ICD-10-CM

## 2022-12-24 DIAGNOSIS — I10 ESSENTIAL (PRIMARY) HYPERTENSION: ICD-10-CM

## 2022-12-24 DIAGNOSIS — E03.9 HYPOTHYROIDISM, UNSPECIFIED: ICD-10-CM
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
## 2022-12-24 DIAGNOSIS — Z71.89 OTHER SPECIFIED COUNSELING: ICD-10-CM

## 2022-12-24 DIAGNOSIS — J44.1 CHRONIC OBSTRUCTIVE PULMONARY DISEASE WITH (ACUTE) EXACERBATION: ICD-10-CM

## 2022-12-24 DIAGNOSIS — L03.90 CELLULITIS, UNSPECIFIED: ICD-10-CM

## 2022-12-24 DIAGNOSIS — F25.9 SCHIZOAFFECTIVE DISORDER, UNSPECIFIED: ICD-10-CM

## 2022-12-24 LAB
A1C WITH ESTIMATED AVERAGE GLUCOSE RESULT: 5.8 % — HIGH (ref 4–5.6)
ANION GAP SERPL CALC-SCNC: 9 MMOL/L — SIGNIFICANT CHANGE UP (ref 5–17)
APPEARANCE UR: CLEAR — SIGNIFICANT CHANGE UP
BILIRUB UR-MCNC: NEGATIVE — SIGNIFICANT CHANGE UP
BUN SERPL-MCNC: 18 MG/DL — SIGNIFICANT CHANGE UP (ref 7–18)
CALCIUM SERPL-MCNC: 8.8 MG/DL — SIGNIFICANT CHANGE UP (ref 8.4–10.5)
CHLORIDE SERPL-SCNC: 104 MMOL/L — SIGNIFICANT CHANGE UP (ref 96–108)
CHOLEST SERPL-MCNC: 133 MG/DL — SIGNIFICANT CHANGE UP
CO2 SERPL-SCNC: 28 MMOL/L — SIGNIFICANT CHANGE UP (ref 22–31)
COLOR SPEC: YELLOW — SIGNIFICANT CHANGE UP
CREAT SERPL-MCNC: 0.71 MG/DL — SIGNIFICANT CHANGE UP (ref 0.5–1.3)
DIFF PNL FLD: ABNORMAL
EGFR: 89 ML/MIN/1.73M2 — SIGNIFICANT CHANGE UP
ESTIMATED AVERAGE GLUCOSE: 120 MG/DL — HIGH (ref 68–114)
GLUCOSE BLDC GLUCOMTR-MCNC: 171 MG/DL — HIGH (ref 70–99)
GLUCOSE BLDC GLUCOMTR-MCNC: 183 MG/DL — HIGH (ref 70–99)
GLUCOSE BLDC GLUCOMTR-MCNC: 215 MG/DL — HIGH (ref 70–99)
GLUCOSE BLDC GLUCOMTR-MCNC: 220 MG/DL — HIGH (ref 70–99)
GLUCOSE SERPL-MCNC: 221 MG/DL — HIGH (ref 70–99)
GLUCOSE UR QL: NEGATIVE — SIGNIFICANT CHANGE UP
HCT VFR BLD CALC: 38.1 % — SIGNIFICANT CHANGE UP (ref 34.5–45)
HDLC SERPL-MCNC: 50 MG/DL — LOW
HGB BLD-MCNC: 12.2 G/DL — SIGNIFICANT CHANGE UP (ref 11.5–15.5)
KETONES UR-MCNC: ABNORMAL
LACTATE SERPL-SCNC: 1 MMOL/L — SIGNIFICANT CHANGE UP (ref 0.7–2)
LEUKOCYTE ESTERASE UR-ACNC: NEGATIVE — SIGNIFICANT CHANGE UP
LIPID PNL WITH DIRECT LDL SERPL: 73 MG/DL — SIGNIFICANT CHANGE UP
MAGNESIUM SERPL-MCNC: 1.9 MG/DL — SIGNIFICANT CHANGE UP (ref 1.6–2.6)
MCHC RBC-ENTMCNC: 26 PG — LOW (ref 27–34)
MCHC RBC-ENTMCNC: 32 GM/DL — SIGNIFICANT CHANGE UP (ref 32–36)
MCV RBC AUTO: 81.1 FL — SIGNIFICANT CHANGE UP (ref 80–100)
NITRITE UR-MCNC: NEGATIVE — SIGNIFICANT CHANGE UP
NON HDL CHOLESTEROL: 83 MG/DL — SIGNIFICANT CHANGE UP
NRBC # BLD: 0 /100 WBCS — SIGNIFICANT CHANGE UP (ref 0–0)
PH UR: 5 — SIGNIFICANT CHANGE UP (ref 5–8)
PHOSPHATE SERPL-MCNC: 4.2 MG/DL — SIGNIFICANT CHANGE UP (ref 2.5–4.5)
PLATELET # BLD AUTO: 210 K/UL — SIGNIFICANT CHANGE UP (ref 150–400)
POTASSIUM SERPL-MCNC: 3.7 MMOL/L — SIGNIFICANT CHANGE UP (ref 3.5–5.3)
POTASSIUM SERPL-SCNC: 3.7 MMOL/L — SIGNIFICANT CHANGE UP (ref 3.5–5.3)
PROT UR-MCNC: 100
RAPID RVP RESULT: SIGNIFICANT CHANGE UP
RBC # BLD: 4.7 M/UL — SIGNIFICANT CHANGE UP (ref 3.8–5.2)
RBC # FLD: 17.2 % — HIGH (ref 10.3–14.5)
SARS-COV-2 RNA SPEC QL NAA+PROBE: SIGNIFICANT CHANGE UP
SODIUM SERPL-SCNC: 141 MMOL/L — SIGNIFICANT CHANGE UP (ref 135–145)
SP GR SPEC: 1.01 — SIGNIFICANT CHANGE UP (ref 1.01–1.02)
TRIGL SERPL-MCNC: 50 MG/DL — SIGNIFICANT CHANGE UP
UROBILINOGEN FLD QL: NEGATIVE — SIGNIFICANT CHANGE UP
WBC # BLD: 6.36 K/UL — SIGNIFICANT CHANGE UP (ref 3.8–10.5)
WBC # FLD AUTO: 6.36 K/UL — SIGNIFICANT CHANGE UP (ref 3.8–10.5)

## 2022-12-24 RX ORDER — ASPIRIN/CALCIUM CARB/MAGNESIUM 324 MG
81 TABLET ORAL DAILY
Refills: 0 | Status: DISCONTINUED | OUTPATIENT
Start: 2022-12-24 | End: 2023-01-05

## 2022-12-24 RX ORDER — OLANZAPINE 15 MG/1
10 TABLET, FILM COATED ORAL DAILY
Refills: 0 | Status: DISCONTINUED | OUTPATIENT
Start: 2022-12-24 | End: 2023-01-05

## 2022-12-24 RX ORDER — ENOXAPARIN SODIUM 100 MG/ML
40 INJECTION SUBCUTANEOUS EVERY 24 HOURS
Refills: 0 | Status: DISCONTINUED | OUTPATIENT
Start: 2022-12-24 | End: 2023-01-05

## 2022-12-24 RX ORDER — LACTULOSE 10 G/15ML
20 SOLUTION ORAL
Refills: 0 | Status: DISCONTINUED | OUTPATIENT
Start: 2022-12-24 | End: 2023-01-05

## 2022-12-24 RX ORDER — VANCOMYCIN HCL 1 G
VIAL (EA) INTRAVENOUS
Refills: 0 | Status: DISCONTINUED | OUTPATIENT
Start: 2022-12-24 | End: 2022-12-24

## 2022-12-24 RX ORDER — FUROSEMIDE 40 MG
40 TABLET ORAL DAILY
Refills: 0 | Status: DISCONTINUED | OUTPATIENT
Start: 2022-12-24 | End: 2023-01-05

## 2022-12-24 RX ORDER — AMLODIPINE BESYLATE 2.5 MG/1
10 TABLET ORAL DAILY
Refills: 0 | Status: DISCONTINUED | OUTPATIENT
Start: 2022-12-24 | End: 2023-01-05

## 2022-12-24 RX ORDER — VANCOMYCIN HCL 1 G
1250 VIAL (EA) INTRAVENOUS EVERY 12 HOURS
Refills: 0 | Status: DISCONTINUED | OUTPATIENT
Start: 2022-12-24 | End: 2022-12-24

## 2022-12-24 RX ORDER — AMPICILLIN SODIUM AND SULBACTAM SODIUM 250; 125 MG/ML; MG/ML
3 INJECTION, POWDER, FOR SUSPENSION INTRAMUSCULAR; INTRAVENOUS EVERY 6 HOURS
Refills: 0 | Status: DISCONTINUED | OUTPATIENT
Start: 2022-12-24 | End: 2022-12-26

## 2022-12-24 RX ORDER — ALBUTEROL 90 UG/1
2 AEROSOL, METERED ORAL EVERY 6 HOURS
Refills: 0 | Status: DISCONTINUED | OUTPATIENT
Start: 2022-12-24 | End: 2023-01-05

## 2022-12-24 RX ORDER — BUDESONIDE AND FORMOTEROL FUMARATE DIHYDRATE 160; 4.5 UG/1; UG/1
2 AEROSOL RESPIRATORY (INHALATION)
Refills: 0 | Status: DISCONTINUED | OUTPATIENT
Start: 2022-12-24 | End: 2023-01-05

## 2022-12-24 RX ORDER — VANCOMYCIN HCL 1 G
1250 VIAL (EA) INTRAVENOUS ONCE
Refills: 0 | Status: COMPLETED | OUTPATIENT
Start: 2022-12-24 | End: 2022-12-24

## 2022-12-24 RX ORDER — LEVOTHYROXINE SODIUM 125 MCG
200 TABLET ORAL DAILY
Refills: 0 | Status: DISCONTINUED | OUTPATIENT
Start: 2022-12-24 | End: 2022-12-29

## 2022-12-24 RX ADMIN — LACTULOSE 20 GRAM(S): 10 SOLUTION ORAL at 17:32

## 2022-12-24 RX ADMIN — Medication 2: at 12:16

## 2022-12-24 RX ADMIN — Medication 200 MICROGRAM(S): at 06:41

## 2022-12-24 RX ADMIN — BUDESONIDE AND FORMOTEROL FUMARATE DIHYDRATE 2 PUFF(S): 160; 4.5 AEROSOL RESPIRATORY (INHALATION) at 21:54

## 2022-12-24 RX ADMIN — Medication 40 MILLIGRAM(S): at 06:40

## 2022-12-24 RX ADMIN — Medication 1 DROP(S): at 17:33

## 2022-12-24 RX ADMIN — OLANZAPINE 10 MILLIGRAM(S): 15 TABLET, FILM COATED ORAL at 12:17

## 2022-12-24 RX ADMIN — Medication 40 MILLIGRAM(S): at 14:14

## 2022-12-24 RX ADMIN — AMLODIPINE BESYLATE 10 MILLIGRAM(S): 2.5 TABLET ORAL at 06:39

## 2022-12-24 RX ADMIN — ENOXAPARIN SODIUM 40 MILLIGRAM(S): 100 INJECTION SUBCUTANEOUS at 06:38

## 2022-12-24 RX ADMIN — Medication 40 MILLIGRAM(S): at 21:52

## 2022-12-24 RX ADMIN — ALBUTEROL 2 PUFF(S): 90 AEROSOL, METERED ORAL at 10:55

## 2022-12-24 RX ADMIN — Medication 166.67 MILLIGRAM(S): at 09:43

## 2022-12-24 RX ADMIN — AMPICILLIN SODIUM AND SULBACTAM SODIUM 200 GRAM(S): 250; 125 INJECTION, POWDER, FOR SUSPENSION INTRAMUSCULAR; INTRAVENOUS at 18:15

## 2022-12-24 RX ADMIN — AMPICILLIN SODIUM AND SULBACTAM SODIUM 200 GRAM(S): 250; 125 INJECTION, POWDER, FOR SUSPENSION INTRAMUSCULAR; INTRAVENOUS at 12:16

## 2022-12-24 RX ADMIN — Medication 1: at 17:11

## 2022-12-24 RX ADMIN — Medication 81 MILLIGRAM(S): at 12:17

## 2022-12-24 RX ADMIN — AMPICILLIN SODIUM AND SULBACTAM SODIUM 200 GRAM(S): 250; 125 INJECTION, POWDER, FOR SUSPENSION INTRAMUSCULAR; INTRAVENOUS at 06:38

## 2022-12-24 RX ADMIN — ALBUTEROL 2 PUFF(S): 90 AEROSOL, METERED ORAL at 21:54

## 2022-12-24 RX ADMIN — Medication 1: at 08:48

## 2022-12-24 RX ADMIN — BUDESONIDE AND FORMOTEROL FUMARATE DIHYDRATE 2 PUFF(S): 160; 4.5 AEROSOL RESPIRATORY (INHALATION) at 10:54

## 2022-12-24 RX ADMIN — Medication 40 MILLIGRAM(S): at 06:39

## 2022-12-24 RX ADMIN — ALBUTEROL 2 PUFF(S): 90 AEROSOL, METERED ORAL at 14:15

## 2022-12-24 NOTE — PATIENT PROFILE ADULT - FALL HARM RISK - HARM RISK INTERVENTIONS

## 2022-12-24 NOTE — PROGRESS NOTE ADULT - ASSESSMENT
77 year old F with PMH of HTN, hypothyroidism, CHF, COPD, bipolar d/o, schizoaffective d/o, DM presents after fall. Pt reports she fell from her wheelchair, denies LOC but reports head trauma and left lower leg trauma and is now pain free. Denies pain, fever, chills, shortness of breath, cough, wheezing, chest pain, lightheadedness, dizziness, n/v/d, changes in urinary or bowel habits.    ED Course:  Vitals: /69 P 120 R 18 T 98.2F > 102.2F  SpO2 97% RA  Meds: unasyn, solumedrol 125 mg IV, duonebs x 2

## 2022-12-24 NOTE — PROGRESS NOTE ADULT - PROBLEM SELECTOR PLAN 9
DVT and GI prophylaxis.  Monitor neuron status and hematoma.  Continue oxygen support. Currently on 2LPM.  Continue Unisyn.  Bronchodilators and ICS  IV steroids

## 2022-12-24 NOTE — H&P ADULT - PROBLEM SELECTOR PLAN 3
pt p/w fever/sepsis and bilateral LE cellulitis  s/p unasyn in ED    - c/w unasyn pt p/w fever/sepsis and bilateral LE cellulitis  s/p unasyn in ED    - c/w unasyn  - will start vancomycin for staph/MRSA coverage

## 2022-12-24 NOTE — PROGRESS NOTE ADULT - PROBLEM SELECTOR PLAN 1
Admitted with fever 102.2  Given vanco and started on Unisyn in ED.  Continue Unusyn.  F/U blood cultures and urine culture. Serum lactate normal.  +Cellulitis bilateral lower extremities.

## 2022-12-24 NOTE — PROGRESS NOTE ADULT - SUBJECTIVE AND OBJECTIVE BOX
ARNOLDO PUTNAM    SCU progress note    INTERVAL HPI/OVERNIGHT EVENTS: ***Admitted to SCU  S/P fall from wheelchair at [facility]. Reported hit head in fall.  HPI: 77 year old F with PMH of HTN, hypothyroidism, CHF, COPD, bipolar d/o, schizoaffective d/o, DM presents after fall. Pt reports she fell from her wheelchair, denies LOC but reports head trauma and left lower leg trauma and is now pain free. Denies pain, fever, chills, shortness of breath, cough, wheezing, chest pain, lightheadedness, dizziness, n/v/d, changes in urinary or bowel habits.    ED Course:  Vitals: /69 P 120 R 18 T 98.2F > 102.2F  SpO2 97% RA  Meds: unasyn, solumedrol 125 mg IV, duonebs x 2      DNR [ ]   DNI  [  ]  FULL CODE    Covid - 19 PCR: Negative     The 4Ms    What Matters Most: see GOC  Age appropriate Medications/Screen for High Risk Medication: Yes  Mentation: see CAM below  Mobility: defer to physical exam    The Confusion Assessment Method (CAM) Diagnostic Algorithm     1: Acute Onset or Fluctuating Course  - Is there evidence of an acute change in mental status from the patient’s baseline? Did the (abnormal) behavior  fluctuate during the day, that is, tend to come and go, or increase and decrease in severity?  [ ] YES [x ] NO     2: Inattention  - Did the patient have difficulty focusing attention, being easily distractible, or having difficulty keeping track of what was being said?  [ ] YES [x ] NO     3: Disorganized thinking  -Was the patient’s thinking disorganized or incoherent, such as rambling or irrelevant conversation, unclear or illogical flow of ideas, or unpredictable switching from subject to subject?  [ ] YES [x ] NO    4: Altered Level of consciousness?  [ ] YES [x ] NO    The diagnosis of delirium by CAM requires the presence of features 1 and 2 and either 3 or 4.    PRESSORS: [ ] YES [x ] NO  ampicillin/sulbactam  IVPB 3 Gram(s) IV Intermittent every 6 hours    Cardiovascular:  Heart Failure  Acute   Acute on Chronic  Chronic       amLODIPine   Tablet 10 milliGRAM(s) Oral daily  furosemide    Tablet 40 milliGRAM(s) Oral daily    Pulmonary:  albuterol    90 MICROgram(s) HFA Inhaler 2 Puff(s) Inhalation every 6 hours  budesonide 160 MICROgram(s)/formoterol 4.5 MICROgram(s) Inhaler 2 Puff(s) Inhalation two times a day    Hematalogic:  aspirin  chewable 81 milliGRAM(s) Oral daily  enoxaparin Injectable 40 milliGRAM(s) SubCutaneous every 24 hours    Other:  acetaminophen     Tablet .. 650 milliGRAM(s) Oral every 6 hours PRN  artificial tears (preservative free) Ophthalmic Solution 1 Drop(s) Both EYES two times a day  insulin lispro (ADMELOG) corrective regimen sliding scale   SubCutaneous three times a day before meals  insulin lispro (ADMELOG) corrective regimen sliding scale   SubCutaneous at bedtime  lactulose Syrup 20 Gram(s) Oral two times a day  levothyroxine 200 MICROGram(s) Oral daily  methylPREDNISolone sodium succinate Injectable 40 milliGRAM(s) IV Push every 8 hours  OLANZapine 10 milliGRAM(s) Oral daily    acetaminophen     Tablet .. 650 milliGRAM(s) Oral every 6 hours PRN  albuterol    90 MICROgram(s) HFA Inhaler 2 Puff(s) Inhalation every 6 hours  amLODIPine   Tablet 10 milliGRAM(s) Oral daily  ampicillin/sulbactam  IVPB 3 Gram(s) IV Intermittent every 6 hours  artificial tears (preservative free) Ophthalmic Solution 1 Drop(s) Both EYES two times a day  aspirin  chewable 81 milliGRAM(s) Oral daily  budesonide 160 MICROgram(s)/formoterol 4.5 MICROgram(s) Inhaler 2 Puff(s) Inhalation two times a day  enoxaparin Injectable 40 milliGRAM(s) SubCutaneous every 24 hours  furosemide    Tablet 40 milliGRAM(s) Oral daily  insulin lispro (ADMELOG) corrective regimen sliding scale   SubCutaneous three times a day before meals  insulin lispro (ADMELOG) corrective regimen sliding scale   SubCutaneous at bedtime  lactulose Syrup 20 Gram(s) Oral two times a day  levothyroxine 200 MICROGram(s) Oral daily  methylPREDNISolone sodium succinate Injectable 40 milliGRAM(s) IV Push every 8 hours  OLANZapine 10 milliGRAM(s) Oral daily    Drug Dosing Weight  Height (cm): 157.5 (24 Dec 2022 06:37)  Weight (kg): 93.4 (23 Dec 2022 17:28)  BMI (kg/m2): 37.7 (24 Dec 2022 06:37)  BSA (m2): 1.94 (24 Dec 2022 06:37)    CENTRAL LINE: [ ] YES [x ] NO  LOCATION:   DATE INSERTED:  REMOVE: [ ] YES [ ] NO  EXPLAIN:    CASTRO: [ ] YES [x ] NO    DATE INSERTED:  REMOVE:  [ ] YES [ ] NO  EXPLAIN:    PAST MEDICAL & SURGICAL HISTORY:  HTN (hypertension)      Schizo affective schizophrenia      Bipolar 1 disorder      Hypothyroidism      Arthritis      Hypercholesteremia      Chronic obstructive pulmonary disease, unspecified COPD type      Chronic diastolic congestive heart failure      Interstitial lung disease      Poor historian      No significant past surgical history                        PHYSICAL EXAM:    GENERAL: NAD, well-groomed, well-developed  HEAD:  Atraumatic, Normocephalic  EYES: EOMI, PERRLA, conjunctiva and sclera clear. +Redness under left eye.    ENMT: No tonsillar erythema, exudates  NECK: Supple, No JVD  NERVOUS SYSTEM:  Awake and alert. Follows commands. Moving all extremities.  CHEST/LUNG: Diminished breath sounds bilaterally with a few scattered rhonchi.  HEART: Regular rate and rhythm; No murmurs, rubs, or gallops  ABDOMEN: Soft, Nontender, Nondistended; Bowel sounds present  EXTREMITIES: +1 edema bilateral extremities.  2+ Peripheral Pulses, No clubbing, cyanosis  LYMPH: No lymphadenopathy noted  SKIN: +Redness under left eye. Bilateral lower extremities reddened. Few ulcers noted.       LABS:  CBC Full  -  ( 24 Dec 2022 04:45 )  WBC Count : 6.36 K/uL  RBC Count : 4.70 M/uL  Hemoglobin : 12.2 g/dL  Hematocrit : 38.1 %  Platelet Count - Automated : 210 K/uL  Mean Cell Volume : 81.1 fl  Mean Cell Hemoglobin : 26.0 pg  Mean Cell Hemoglobin Concentration : 32.0 gm/dL  Auto Neutrophil # : x  Auto Lymphocyte # : x  Auto Monocyte # : x  Auto Eosinophil # : x  Auto Basophil # : x  Auto Neutrophil % : x  Auto Lymphocyte % : x  Auto Monocyte % : x  Auto Eosinophil % : x  Auto Basophil % : x    12-24    141  |  104  |  18  ----------------------------<  221<H>  3.7   |  28  |  0.71    Ca    8.8      24 Dec 2022 04:45  Phos  4.2     12-24  Mg     1.9     12-24    TPro  7.1  /  Alb  2.5<L>  /  TBili  0.4  /  DBili  x   /  AST  14  /  ALT  14  /  AlkPhos  73        Urinalysis Basic - ( 24 Dec 2022 04:10 )    Color: Yellow / Appearance: Clear / S.015 / pH: x  Gluc: x / Ketone: Small  / Bili: Negative / Urobili: Negative   Blood: x / Protein: 100 / Nitrite: Negative   Leuk Esterase: Negative / RBC: 5-10 /HPF / WBC 0-2 /HPF   Sq Epi: x / Non Sq Epi: Occasional /HPF / Bacteria: Trace /HPF            [  ]  DVT Prophylaxis  [  ]  Nutrition, Brand, Rate         Goal Rate        Abnormal Nutritional Status -  Malnutrition   Cachexia          RADIOLOGY & ADDITIONAL STUDIES:  ***  < from: Xray Chest 1 View-PORTABLE IMMEDIATE (22 @ 18:52) >  IMPRESSION: There is mild patchy opacification the lungs appears   compatible with mild pulmonary edema. The heart is normal in size. There   are severe degenerative changes of the shoulders bilaterally.    < end of copied text >  < from: CT Head No Cont (22 @ 21:19) >  COMPARISON: MRI brain 2015.    FINDINGS: No intracranial hemorrhage is seen. The grey-white   differentiation is maintained. Mild periventricular and subcortical white   matter hypoattenuation without mass effect is noted, non-specific, but   likely related to chronic small vessel ischemic changes.    Cerebral volume loss is present with proportional prominence of the sulci   and ventricles. No mass effect or midline shift is seen. Basal cisterns   are not effaced.    The visualized paranasal sinuses and tympanomastoid cavities are clear.   Evidence of prior bilateral ocular lens surgery.    Left frontotemporal scalp hematoma. No displaced calvarial fracture.    IMPRESSION: No intracranial hemorrhage or mass effect. Left   frontotemporal scalp hematoma.    < end of copied text >    Goals of Care Discussion with Family/Proxy/Other   - see note from/family meeting set up for...

## 2022-12-24 NOTE — H&P ADULT - PROBLEM SELECTOR PLAN 9
patient has h/o bipolar d/o and schizoaffective disorder, controlled with Zyprexa 10 mg qd    - c/w home med

## 2022-12-24 NOTE — PROGRESS NOTE ADULT - PROBLEM SELECTOR PLAN 8
Continue zyprexa 10mg daily.  Hold home dose of ativan (0.5mg HS)  for now  Maintain safety measures.

## 2022-12-24 NOTE — PROGRESS NOTE ADULT - NS ATTEND AMEND GEN_ALL_CORE FT
Problem/Plan - 1:  ·  Problem: Sepsis, unspecified organism.   ·  Plan: Admitted with fever 102.2  Given vanco and started on Unisyn in ED.  Continue Unusyn.  F/U blood cultures and urine culture. Serum lactate normal.  +Cellulitis bilateral lower extremities.     Problem/Plan - 2:  ·  Problem: Fall.   ·  Plan: S/P mechanical fall at NH  +trauma/hematoma to forehead. Denying any pain.  CT +Hematome forehead. No bleeding or midline shift noted on CT scan.  Monitor size of hematoma.  Tylenol only for pain.  Maintain fall and safety measures.  Monitor neuro status.     Problem/Plan - 3:  ·  Problem: COPD exacerbation.   ·  Plan: Known COPD  Uses Breo and albuterol at facility.  Does not use oxygen or BIPAP  Presented with significant wheezing in ED  Continue bronchodilators and ICS  Continue oxygen support. Currently tolerating 2LPM. Monitor oxygen saturation  Continue solumedrol 40mg every 8 hours.     Problem/Plan - 4:  ·  Problem: Chronic CHF.   ·  Plan: Continue amlodipine and lasix.  Statin, ASA,     Problem/Plan - 5:  ·  Problem: Diabetes.   ·  Plan: Continue sliding scale coverage.  Monitor glucose.

## 2022-12-24 NOTE — H&P ADULT - PROBLEM SELECTOR PLAN 7
pt takes norvasc 10 mg qd for h/o HTN  BP elevated upon admission    - c/w home dose amlodipine 10 mg qd with parameters

## 2022-12-24 NOTE — H&P ADULT - NSHPPHYSICALEXAM_GEN_ALL_CORE
PHYSICAL EXAM:  GENERAL: NAD, lying in bed comfortably  HEAD:  Left frontal ecchymosis, Normocephalic  EYES: EOMI, PERRLA, conjunctiva and sclera clear  ENT: Moist mucous membranes  NECK: Supple, No JVD  CHEST/LUNG: Clear to auscultation bilaterally; No rales, rhonchi, wheezing, or rubs  HEART: Regular rate and rhythm; No murmurs, rubs, or gallops  ABDOMEN: Bowel sounds present; Soft, Nontender, Nondistended.   EXTREMITIES:  2+ Peripheral Pulses, brisk capillary refill. b/l LE 2+ edema with erythema, warmth  and chronic venous changes  NERVOUS SYSTEM:  Alert & Oriented X3, speech clear. No deficits   MSK: FROM all 4 extremities, full and equal strength  SKIN: No rashes or lesions

## 2022-12-24 NOTE — H&P ADULT - PROBLEM SELECTOR PLAN 1
patient p/w s/p fall, appears to be septic given T 102.2F and tachycardia  WBC wnl, normotensive, CXR without focal consolidation  patient with bilateral LE cellulitis    - f/u UA, UCx, BCx  - monitor temp  - PRN acetaminophen for fever patient p/w s/p fall, appears to be septic given T 102.2F and tachycardia  WBC wnl, normotensive, CXR without focal consolidation  patient with bilateral LE cellulitis  lactate 1.0, s/p unasyn in ED    - f/u UA, UCx, BCx  - monitor temp  - PRN acetaminophen for fever

## 2022-12-24 NOTE — H&P ADULT - PROBLEM SELECTOR PLAN 2
pt had mechanical fall from wheelchair with head trauma. CT shows left frontotemporal scalp hematoma    - monitor hematoma  - PRN acetaminophen for pain control

## 2022-12-24 NOTE — H&P ADULT - ASSESSMENT
77 year old F with PMH of HTN, hypothyroidism, CHF, COPD, bipolar d/o, schizoaffective d/o, DM presents after fall admitted for sepsis and fall

## 2022-12-24 NOTE — H&P ADULT - PROBLEM SELECTOR PLAN 6
pt with h/o CHF per notes, only on furosemide 40 mg qd  does not appear to be in exacerbation at this time    - c/w PO furosemide

## 2022-12-24 NOTE — PROGRESS NOTE ADULT - PROBLEM SELECTOR PLAN 2
S/P mechanical fall at NH  +trauma/hematoma to forehead. Denying any pain.  CT +Hematome forehead. No bleeding or midline shift noted on CT scan.  Monitor size of hematoma.  Tylenol only for pain.  Maintain fall and safety measures.  Monitor neuro status.

## 2022-12-25 ENCOUNTER — TRANSCRIPTION ENCOUNTER (OUTPATIENT)
Age: 77
End: 2022-12-25

## 2022-12-25 DIAGNOSIS — L03.115 CELLULITIS OF RIGHT LOWER LIMB: ICD-10-CM

## 2022-12-25 DIAGNOSIS — E87.6 HYPOKALEMIA: ICD-10-CM

## 2022-12-25 LAB
ALBUMIN SERPL ELPH-MCNC: 2.5 G/DL — LOW (ref 3.5–5)
ALP SERPL-CCNC: 66 U/L — SIGNIFICANT CHANGE UP (ref 40–120)
ALT FLD-CCNC: 18 U/L DA — SIGNIFICANT CHANGE UP (ref 10–60)
ANION GAP SERPL CALC-SCNC: 9 MMOL/L — SIGNIFICANT CHANGE UP (ref 5–17)
AST SERPL-CCNC: 17 U/L — SIGNIFICANT CHANGE UP (ref 10–40)
BASE EXCESS BLDA CALC-SCNC: 5 MMOL/L — HIGH (ref -2–3)
BILIRUB SERPL-MCNC: 0.2 MG/DL — SIGNIFICANT CHANGE UP (ref 0.2–1.2)
BLOOD GAS COMMENTS ARTERIAL: SIGNIFICANT CHANGE UP
BUN SERPL-MCNC: 24 MG/DL — HIGH (ref 7–18)
CALCIUM SERPL-MCNC: 8.5 MG/DL — SIGNIFICANT CHANGE UP (ref 8.4–10.5)
CHLORIDE SERPL-SCNC: 106 MMOL/L — SIGNIFICANT CHANGE UP (ref 96–108)
CO2 SERPL-SCNC: 31 MMOL/L — SIGNIFICANT CHANGE UP (ref 22–31)
CREAT SERPL-MCNC: 0.72 MG/DL — SIGNIFICANT CHANGE UP (ref 0.5–1.3)
CULTURE RESULTS: NO GROWTH — SIGNIFICANT CHANGE UP
EGFR: 87 ML/MIN/1.73M2 — SIGNIFICANT CHANGE UP
GLUCOSE BLDC GLUCOMTR-MCNC: 164 MG/DL — HIGH (ref 70–99)
GLUCOSE BLDC GLUCOMTR-MCNC: 166 MG/DL — HIGH (ref 70–99)
GLUCOSE BLDC GLUCOMTR-MCNC: 282 MG/DL — HIGH (ref 70–99)
GLUCOSE BLDC GLUCOMTR-MCNC: 319 MG/DL — HIGH (ref 70–99)
GLUCOSE SERPL-MCNC: 198 MG/DL — HIGH (ref 70–99)
HCO3 BLDA-SCNC: 31 MMOL/L — HIGH (ref 21–28)
HCT VFR BLD CALC: 36.3 % — SIGNIFICANT CHANGE UP (ref 34.5–45)
HCV AB S/CO SERPL IA: 0.15 S/CO — SIGNIFICANT CHANGE UP (ref 0–0.99)
HCV AB SERPL-IMP: SIGNIFICANT CHANGE UP
HGB BLD-MCNC: 11.3 G/DL — LOW (ref 11.5–15.5)
HOROWITZ INDEX BLDA+IHG-RTO: 28 — SIGNIFICANT CHANGE UP
MAGNESIUM SERPL-MCNC: 2.4 MG/DL — SIGNIFICANT CHANGE UP (ref 1.6–2.6)
MCHC RBC-ENTMCNC: 25.6 PG — LOW (ref 27–34)
MCHC RBC-ENTMCNC: 31.1 GM/DL — LOW (ref 32–36)
MCV RBC AUTO: 82.1 FL — SIGNIFICANT CHANGE UP (ref 80–100)
MRSA PCR RESULT.: SIGNIFICANT CHANGE UP
NRBC # BLD: 0 /100 WBCS — SIGNIFICANT CHANGE UP (ref 0–0)
PCO2 BLDA: 50 MMHG — HIGH (ref 32–35)
PH BLDA: 7.4 — SIGNIFICANT CHANGE UP (ref 7.35–7.45)
PHOSPHATE SERPL-MCNC: 2.8 MG/DL — SIGNIFICANT CHANGE UP (ref 2.5–4.5)
PLATELET # BLD AUTO: 213 K/UL — SIGNIFICANT CHANGE UP (ref 150–400)
PO2 BLDA: 103 MMHG — SIGNIFICANT CHANGE UP (ref 83–108)
POTASSIUM SERPL-MCNC: 3.2 MMOL/L — LOW (ref 3.5–5.3)
POTASSIUM SERPL-SCNC: 3.2 MMOL/L — LOW (ref 3.5–5.3)
PROT SERPL-MCNC: 6.9 G/DL — SIGNIFICANT CHANGE UP (ref 6–8.3)
RBC # BLD: 4.42 M/UL — SIGNIFICANT CHANGE UP (ref 3.8–5.2)
RBC # FLD: 17.5 % — HIGH (ref 10.3–14.5)
S AUREUS DNA NOSE QL NAA+PROBE: SIGNIFICANT CHANGE UP
SAO2 % BLDA: 99 % — SIGNIFICANT CHANGE UP
SODIUM SERPL-SCNC: 146 MMOL/L — HIGH (ref 135–145)
SPECIMEN SOURCE: SIGNIFICANT CHANGE UP
WBC # BLD: 12.76 K/UL — HIGH (ref 3.8–10.5)
WBC # FLD AUTO: 12.76 K/UL — HIGH (ref 3.8–10.5)

## 2022-12-25 RX ORDER — POTASSIUM CHLORIDE 20 MEQ
40 PACKET (EA) ORAL EVERY 4 HOURS
Refills: 0 | Status: COMPLETED | OUTPATIENT
Start: 2022-12-25 | End: 2022-12-25

## 2022-12-25 RX ORDER — POTASSIUM CHLORIDE 20 MEQ
20 PACKET (EA) ORAL DAILY
Refills: 0 | Status: COMPLETED | OUTPATIENT
Start: 2022-12-26 | End: 2022-12-27

## 2022-12-25 RX ADMIN — AMLODIPINE BESYLATE 10 MILLIGRAM(S): 2.5 TABLET ORAL at 06:21

## 2022-12-25 RX ADMIN — ALBUTEROL 2 PUFF(S): 90 AEROSOL, METERED ORAL at 08:07

## 2022-12-25 RX ADMIN — LACTULOSE 20 GRAM(S): 10 SOLUTION ORAL at 17:03

## 2022-12-25 RX ADMIN — Medication 200 MICROGRAM(S): at 06:20

## 2022-12-25 RX ADMIN — AMPICILLIN SODIUM AND SULBACTAM SODIUM 200 GRAM(S): 250; 125 INJECTION, POWDER, FOR SUSPENSION INTRAMUSCULAR; INTRAVENOUS at 06:20

## 2022-12-25 RX ADMIN — ENOXAPARIN SODIUM 40 MILLIGRAM(S): 100 INJECTION SUBCUTANEOUS at 06:21

## 2022-12-25 RX ADMIN — ALBUTEROL 2 PUFF(S): 90 AEROSOL, METERED ORAL at 14:09

## 2022-12-25 RX ADMIN — Medication 40 MILLIGRAM(S): at 14:09

## 2022-12-25 RX ADMIN — Medication 40 MILLIEQUIVALENT(S): at 10:29

## 2022-12-25 RX ADMIN — ALBUTEROL 2 PUFF(S): 90 AEROSOL, METERED ORAL at 04:14

## 2022-12-25 RX ADMIN — ALBUTEROL 2 PUFF(S): 90 AEROSOL, METERED ORAL at 21:50

## 2022-12-25 RX ADMIN — BUDESONIDE AND FORMOTEROL FUMARATE DIHYDRATE 2 PUFF(S): 160; 4.5 AEROSOL RESPIRATORY (INHALATION) at 09:36

## 2022-12-25 RX ADMIN — Medication 40 MILLIEQUIVALENT(S): at 14:28

## 2022-12-25 RX ADMIN — Medication 2: at 21:54

## 2022-12-25 RX ADMIN — Medication 3: at 11:44

## 2022-12-25 RX ADMIN — Medication 40 MILLIGRAM(S): at 06:21

## 2022-12-25 RX ADMIN — OLANZAPINE 10 MILLIGRAM(S): 15 TABLET, FILM COATED ORAL at 11:40

## 2022-12-25 RX ADMIN — Medication 1 DROP(S): at 17:03

## 2022-12-25 RX ADMIN — AMPICILLIN SODIUM AND SULBACTAM SODIUM 200 GRAM(S): 250; 125 INJECTION, POWDER, FOR SUSPENSION INTRAMUSCULAR; INTRAVENOUS at 17:03

## 2022-12-25 RX ADMIN — Medication 1 DROP(S): at 06:42

## 2022-12-25 RX ADMIN — LACTULOSE 20 GRAM(S): 10 SOLUTION ORAL at 06:20

## 2022-12-25 RX ADMIN — Medication 1: at 17:02

## 2022-12-25 RX ADMIN — Medication 40 MILLIGRAM(S): at 21:50

## 2022-12-25 RX ADMIN — Medication 81 MILLIGRAM(S): at 11:40

## 2022-12-25 RX ADMIN — AMPICILLIN SODIUM AND SULBACTAM SODIUM 200 GRAM(S): 250; 125 INJECTION, POWDER, FOR SUSPENSION INTRAMUSCULAR; INTRAVENOUS at 11:40

## 2022-12-25 RX ADMIN — AMPICILLIN SODIUM AND SULBACTAM SODIUM 200 GRAM(S): 250; 125 INJECTION, POWDER, FOR SUSPENSION INTRAMUSCULAR; INTRAVENOUS at 23:20

## 2022-12-25 RX ADMIN — BUDESONIDE AND FORMOTEROL FUMARATE DIHYDRATE 2 PUFF(S): 160; 4.5 AEROSOL RESPIRATORY (INHALATION) at 21:50

## 2022-12-25 RX ADMIN — AMPICILLIN SODIUM AND SULBACTAM SODIUM 200 GRAM(S): 250; 125 INJECTION, POWDER, FOR SUSPENSION INTRAMUSCULAR; INTRAVENOUS at 00:01

## 2022-12-25 RX ADMIN — Medication 1: at 08:07

## 2022-12-25 NOTE — DISCHARGE NOTE PROVIDER - NSDCCPCAREPLAN_GEN_ALL_CORE_FT
PRINCIPAL DISCHARGE DIAGNOSIS  Diagnosis: COPD exacerbation  Assessment and Plan of Treatment: continue oxygen therapy 2-3L via nasal cannula. Goal oxygen saturation between 88-94% on COPD.   continue medications as prescribed.   If your cough increases infrequency and severity and/or you have shortness of breath or increased shortness of breath report your facility MD.   If you develop fever, chills, night sweats, malaise, and/or change in mental status call your Health care Provider.  Nutrition is very important.  Eat small frequent meals.  Increase your activity as tolerated.  Do not stay in bed all day        SECONDARY DISCHARGE DIAGNOSES  Diagnosis: Sepsis, unspecified organism  Assessment and Plan of Treatment: Resolved after antibiotic.       Diagnosis: Cellulitis of both lower extremities  Assessment and Plan of Treatment: Take all of your antibiotics as ordered.  Follow up with facility MD.   If the affected cellulitic area increases in redness, warmth, pain or swelling, and/or fever/chills, report to your doctor.        Diagnosis: Fall  Assessment and Plan of Treatment: You had fall at nursing facility. CT head resulted no ICH. + hematoma  PT evaluated and recommends -----  When walking: ensure proper footwear, adequate lighting ,avoid loose rugs and clutter in walkway.   Ensure adequate rest ,nutrition, and hydration.  Follow up with facility MD.       Diagnosis: Chronic CHF  Assessment and Plan of Treatment: Weigh yourself daily.  If you gain 3lbs in 3 days, or 5lbs in a week call your Health Care Provider.  Do not eat or drink foods containing more than 2000mg of salt (sodium) in your diet every day.  Call your Health Care Provider if you have any swelling or increased swelling in your feet, ankles, and/or stomach.  Take all of your medication as directed.  If you become dizzy call your Health Care Provider.      Diagnosis: Hypokalemia  Assessment and Plan of Treatment: received potassium supplement for mild hypokalemia, now resolved---->  Eat a balanced diet. Follow up with facility MD to monitor electrolytes.    Diagnosis: Hypertension  Assessment and Plan of Treatment: Low salt diet  Activity as tolerated.  Take all medication as prescribed.  Follow up with your medical doctor for routine blood pressure monitoring at your next visit.  Notify your doctor if you have any of the following symptoms:   Dizziness, Lightheadedness, Blurry vision, Headache, Chest pain, Shortness of breath      Diagnosis: Diabetes  Assessment and Plan of Treatment: HgA1C this admission.  Make sure you get your HgA1c checked every three months.  If you take oral diabetes medications, check your blood glucose two times a day.  If you take insulin, check your blood glucose before meals and at bedtime.  It's important not to skip any meals.  Keep a log of your blood glucose results and always take it with you to your doctor appointments.  Keep a list of your current medications including injectables and over the counter medications and bring this medication list with you to all your doctor appointments.  If you have not seen your ophthalmologist this year call for appointment.  Check your feet daily for redness, sores, or openings. Do not self treat. If no improvement in two days call your primary care physician for an appointment.  Low blood sugar (hypoglycemia) is a blood sugar below 70mg/dl. Check your blood sugar if you feel signs/symptoms of hypoglycemia. If your blood sugar is below 70 take 15 grams of carbohydrates (ex 4 oz of apple juice, 3-4 glucose tablets, or 4-6 oz of regular soda) wait 15 minutes and repeat blood sugar to make sure it comes up above 70.  If your blood sugar is above 70 and you are due for a meal, have a meal.  If you are not due for a meal have a snack.  This snack helps keeps your blood sugar at a safe range.       PRINCIPAL DISCHARGE DIAGNOSIS  Diagnosis: COPD exacerbation  Assessment and Plan of Treatment: continue oxygen therapy 2-3L via nasal cannula. Goal oxygen saturation between 88-94% on COPD.   continue medications as prescribed.   If your cough increases infrequency and severity and/or you have shortness of breath or increased shortness of breath report your facility MD.   If you develop fever, chills, night sweats, malaise, and/or change in mental status call your Health care Provider.  Nutrition is very important.  Eat small frequent meals.  Increase your activity as tolerated.  Do not stay in bed all day  Remember to use inhalers as prescribed. Rinse mouth after using inhalers.        SECONDARY DISCHARGE DIAGNOSES  Diagnosis: Sepsis, unspecified organism  Assessment and Plan of Treatment: Resolved after antibiotic.       Diagnosis: Fall  Assessment and Plan of Treatment: You had fall at nursing facility. CT head resulted no ICH. + hematoma  PT evaluated and recommends -----  When walking: ensure proper footwear, adequate lighting ,avoid loose rugs and clutter in walkway.   Ensure adequate rest ,nutrition, and hydration.  Follow up with facility MD.       Diagnosis: Cellulitis of both lower extremities  Assessment and Plan of Treatment: Take all of your antibiotics as ordered.  Follow up with facility MD.   If the affected cellulitic area increases in redness, warmth, pain or swelling, and/or fever/chills, report to your doctor.        Diagnosis: Chronic CHF  Assessment and Plan of Treatment: Weigh yourself daily.  If you gain 3lbs in 3 days, or 5lbs in a week call your Health Care Provider.  Do not eat or drink foods containing more than 2000mg of salt (sodium) in your diet every day.  Call your Health Care Provider if you have any swelling or increased swelling in your feet, ankles, and/or stomach.  Take all of your medication as directed.  If you become dizzy call your Health Care Provider.      Diagnosis: Hypokalemia  Assessment and Plan of Treatment: received potassium supplement for mild hypokalemia, now resolved---->  Eat a balanced diet. Follow up with facility MD to monitor electrolytes.    Diagnosis: Hypertension  Assessment and Plan of Treatment: Low salt diet  Activity as tolerated.  Take all medication as prescribed.  Follow up with your medical doctor for routine blood pressure monitoring at your next visit.  Notify your doctor if you have any of the following symptoms:   Dizziness, Lightheadedness, Blurry vision, Headache, Chest pain, Shortness of breath      Diagnosis: Diabetes  Assessment and Plan of Treatment: HgA1C this admission.  Make sure you get your HgA1c checked every three months.  If you take oral diabetes medications, check your blood glucose two times a day.  If you take insulin, check your blood glucose before meals and at bedtime.  It's important not to skip any meals.  Keep a log of your blood glucose results and always take it with you to your doctor appointments.  Keep a list of your current medications including injectables and over the counter medications and bring this medication list with you to all your doctor appointments.  If you have not seen your ophthalmologist this year call for appointment.  Check your feet daily for redness, sores, or openings. Do not self treat. If no improvement in two days call your primary care physician for an appointment.  Low blood sugar (hypoglycemia) is a blood sugar below 70mg/dl. Check your blood sugar if you feel signs/symptoms of hypoglycemia. If your blood sugar is below 70 take 15 grams of carbohydrates (ex 4 oz of apple juice, 3-4 glucose tablets, or 4-6 oz of regular soda) wait 15 minutes and repeat blood sugar to make sure it comes up above 70.  If your blood sugar is above 70 and you are due for a meal, have a meal.  If you are not due for a meal have a snack.  This snack helps keeps your blood sugar at a safe range.       PRINCIPAL DISCHARGE DIAGNOSIS  Diagnosis: COPD exacerbation  Assessment and Plan of Treatment: continue oxygen therapy 2-3L via nasal cannula. Goal oxygen saturation between 88-94% on COPD.   continue medications as prescribed.   If your cough increases infrequency and severity and/or you have shortness of breath or increased shortness of breath report your facility MD.   If you develop fever, chills, night sweats, malaise, and/or change in mental status call your Health care Provider.  Nutrition is very important.  Eat small frequent meals.  Increase your activity as tolerated.  Do not stay in bed all day  Remember to use inhalers as prescribed. Rinse mouth after using inhalers.        SECONDARY DISCHARGE DIAGNOSES  Diagnosis: Sepsis, unspecified organism  Assessment and Plan of Treatment: Resolved after antibiotic.       Diagnosis: Fall  Assessment and Plan of Treatment: You had fall at nursing facility. CT head resulted no ICH. + hematoma  PT evaluated and recommends -----  When walking: ensure proper footwear, adequate lighting ,avoid loose rugs and clutter in walkway.   Ensure adequate rest ,nutrition, and hydration.  Follow up with facility MD.       Diagnosis: Cellulitis of both lower extremities  Assessment and Plan of Treatment: Take all of your antibiotics as ordered.  Follow up with facility MD.   If the affected cellulitic area increases in redness, warmth, pain or swelling, and/or fever/chills, report to your doctor.        Diagnosis: Chronic CHF  Assessment and Plan of Treatment: Weigh yourself daily.  If you gain 3lbs in 3 days, or 5lbs in a week call your Health Care Provider.  Do not eat or drink foods containing more than 2000mg of salt (sodium) in your diet every day.  Call your Health Care Provider if you have any swelling or increased swelling in your feet, ankles, and/or stomach.  Take all of your medication as directed.  If you become dizzy call your Health Care Provider.      Diagnosis: Hypokalemia  Assessment and Plan of Treatment: received potassium supplement for mild hypokalemia, now resolved---->  Eat a balanced diet. Follow up with facility MD to monitor electrolytes.    Diagnosis: Hypertension  Assessment and Plan of Treatment: Low salt diet  Activity as tolerated.  Take all medication as prescribed.  Follow up with your medical doctor for routine blood pressure monitoring at your next visit.  Notify your doctor if you have any of the following symptoms:   Dizziness, Lightheadedness, Blurry vision, Headache, Chest pain, Shortness of breath      Diagnosis: Diabetes  Assessment and Plan of Treatment: HgA1C this admission.  Make sure you get your HgA1c checked every three months.  If you take oral diabetes medications, check your blood glucose two times a day.  If you take insulin, check your blood glucose before meals and at bedtime.  It's important not to skip any meals.  Keep a log of your blood glucose results and always take it with you to your doctor appointments.  Keep a list of your current medications including injectables and over the counter medications and bring this medication list with you to all your doctor appointments.  If you have not seen your ophthalmologist this year call for appointment.  Check your feet daily for redness, sores, or openings. Do not self treat. If no improvement in two days call your primary care physician for an appointment.  Low blood sugar (hypoglycemia) is a blood sugar below 70mg/dl. Check your blood sugar if you feel signs/symptoms of hypoglycemia. If your blood sugar is below 70 take 15 grams of carbohydrates (ex 4 oz of apple juice, 3-4 glucose tablets, or 4-6 oz of regular soda) wait 15 minutes and repeat blood sugar to make sure it comes up above 70.  If your blood sugar is above 70 and you are due for a meal, have a meal.  If you are not due for a meal have a snack.  This snack helps keeps your blood sugar at a safe range.      Diagnosis: Hypothyroidism  Assessment and Plan of Treatment: you do not make enough thyroid hormone  signs & symptoms of low levels of thyroid hormone - tired, getting cold easily, coarse or thin hair, constipation, shortness of breath, swelling, irregular periods  your doctor will do thyroid hormone blood tests at least once a year to monitor if medication dose is adequate  take your thyroid medicine as directed by your doctor & on empty stomach  Repeat thyroid function test in 4- 6 weeks        PRINCIPAL DISCHARGE DIAGNOSIS  Diagnosis: COPD exacerbation  Assessment and Plan of Treatment: continue oxygen therapy 2-3L via nasal cannula. Goal oxygen saturation between 88-94% on COPD.   continue medications as prescribed.   If your cough increases infrequency and severity and/or you have shortness of breath or increased shortness of breath report your facility MD.   If you develop fever, chills, night sweats, malaise, and/or change in mental status call your Health care Provider.  Nutrition is very important.  Eat small frequent meals.  Increase your activity as tolerated.  Do not stay in bed all day  Remember to use inhalers as prescribed. Rinse mouth after using inhalers.        SECONDARY DISCHARGE DIAGNOSES  Diagnosis: Sepsis, unspecified organism  Assessment and Plan of Treatment: Resolved after antibiotic.       Diagnosis: Fall  Assessment and Plan of Treatment: You had fall at nursing facility. CT head resulted no ICH. + hematoma  PT evaluated and recommends Rehabilitation   When walking: ensure proper footwear, adequate lighting ,avoid loose rugs and clutter in walkway.   Ensure adequate rest ,nutrition, and hydration.  Follow up with facility MD.       Diagnosis: Cellulitis of both lower extremities  Assessment and Plan of Treatment: Take all of your antibiotics as ordered.  Follow up with facility MD.   If the affected cellulitic area increases in redness, warmth, pain or swelling, and/or fever/chills, report to your doctor.        Diagnosis: Chronic CHF  Assessment and Plan of Treatment: Weigh yourself daily.  If you gain 3lbs in 3 days, or 5lbs in a week call your Health Care Provider.  Do not eat or drink foods containing more than 2000mg of salt (sodium) in your diet every day.  Call your Health Care Provider if you have any swelling or increased swelling in your feet, ankles, and/or stomach.  Take all of your medication as directed.  If you become dizzy call your Health Care Provider.      Diagnosis: Hypokalemia  Assessment and Plan of Treatment: received potassium supplement for mild hypokalemia, now resolved---->  Eat a balanced diet. Follow up with facility MD to monitor electrolytes.    Diagnosis: Hypertension  Assessment and Plan of Treatment: Low salt diet  Activity as tolerated.  Take all medication as prescribed.  Follow up with your medical doctor for routine blood pressure monitoring at your next visit.  Notify your doctor if you have any of the following symptoms:   Dizziness, Lightheadedness, Blurry vision, Headache, Chest pain, Shortness of breath      Diagnosis: Diabetes  Assessment and Plan of Treatment: HgA1C this admission.  Make sure you get your HgA1c checked every three months.  If you take oral diabetes medications, check your blood glucose two times a day.  If you take insulin, check your blood glucose before meals and at bedtime.  It's important not to skip any meals.  Keep a log of your blood glucose results and always take it with you to your doctor appointments.  Keep a list of your current medications including injectables and over the counter medications and bring this medication list with you to all your doctor appointments.  If you have not seen your ophthalmologist this year call for appointment.  Check your feet daily for redness, sores, or openings. Do not self treat. If no improvement in two days call your primary care physician for an appointment.  Low blood sugar (hypoglycemia) is a blood sugar below 70mg/dl. Check your blood sugar if you feel signs/symptoms of hypoglycemia. If your blood sugar is below 70 take 15 grams of carbohydrates (ex 4 oz of apple juice, 3-4 glucose tablets, or 4-6 oz of regular soda) wait 15 minutes and repeat blood sugar to make sure it comes up above 70.  If your blood sugar is above 70 and you are due for a meal, have a meal.  If you are not due for a meal have a snack.  This snack helps keeps your blood sugar at a safe range.      Diagnosis: Hypothyroidism  Assessment and Plan of Treatment: you do not make enough thyroid hormone  signs & symptoms of low levels of thyroid hormone - tired, getting cold easily, coarse or thin hair, constipation, shortness of breath, swelling, irregular periods  your doctor will do thyroid hormone blood tests at least once a year to monitor if medication dose is adequate  take your thyroid medicine as directed by your doctor & on empty stomach  Repeat thyroid function test in 4- 6 weeks        PRINCIPAL DISCHARGE DIAGNOSIS  Diagnosis: COPD exacerbation  Assessment and Plan of Treatment: continue oxygen therapy 2-3L via nasal cannula. Goal oxygen saturation between 88-94% on COPD.   continue medications as prescribed.   If your cough increases infrequency and severity and/or you have shortness of breath or increased shortness of breath report your facility MD.   If you develop fever, chills, night sweats, malaise, and/or change in mental status call your Health care Provider.  Nutrition is very important.  Eat small frequent meals.  Increase your activity as tolerated.  Do not stay in bed all day  Remember to use inhalers as prescribed. Rinse mouth after using inhalers.        SECONDARY DISCHARGE DIAGNOSES  Diagnosis: Sepsis, unspecified organism  Assessment and Plan of Treatment: Resolved after antibiotic.       Diagnosis: Fall  Assessment and Plan of Treatment: You had fall at nursing facility. CT head resulted no ICH. + hematoma  PT evaluated and recommends Rehabilitation   When walking: ensure proper footwear, adequate lighting ,avoid loose rugs and clutter in walkway.   Ensure adequate rest ,nutrition, and hydration.  Follow up with facility MD.       Diagnosis: Cellulitis of both lower extremities  Assessment and Plan of Treatment: Take all of your antibiotics as ordered.  Follow up with facility MD.   If the affected cellulitic area increases in redness, warmth, pain or swelling, and/or fever/chills, report to your doctor.        Diagnosis: Chronic CHF  Assessment and Plan of Treatment: Weigh yourself daily.  If you gain 3lbs in 3 days, or 5lbs in a week call your Health Care Provider.  Do not eat or drink foods containing more than 2000mg of salt (sodium) in your diet every day.  Call your Health Care Provider if you have any swelling or increased swelling in your feet, ankles, and/or stomach.  Take all of your medication as directed.  If you become dizzy call your Health Care Provider.      Diagnosis: Hypokalemia  Assessment and Plan of Treatment: received potassium supplement for mild hypokalemia, now resolved---->  Eat a balanced diet. Follow up with facility MD to monitor electrolytes.    Diagnosis: Hypertension  Assessment and Plan of Treatment: Low salt diet  Activity as tolerated.  Take all medication as prescribed.  Follow up with your medical doctor for routine blood pressure monitoring at your next visit.  Notify your doctor if you have any of the following symptoms:   Dizziness, Lightheadedness, Blurry vision, Headache, Chest pain, Shortness of breath      Diagnosis: Diabetes  Assessment and Plan of Treatment: HgA1C this admission.  Make sure you get your HgA1c checked every three months.  If you take oral diabetes medications, check your blood glucose two times a day.  If you take insulin, check your blood glucose before meals and at bedtime.  It's important not to skip any meals.  Keep a log of your blood glucose results and always take it with you to your doctor appointments.  Keep a list of your current medications including injectables and over the counter medications and bring this medication list with you to all your doctor appointments.  If you have not seen your ophthalmologist this year call for appointment.  Check your feet daily for redness, sores, or openings. Do not self treat. If no improvement in two days call your primary care physician for an appointment.  Low blood sugar (hypoglycemia) is a blood sugar below 70mg/dl. Check your blood sugar if you feel signs/symptoms of hypoglycemia. If your blood sugar is below 70 take 15 grams of carbohydrates (ex 4 oz of apple juice, 3-4 glucose tablets, or 4-6 oz of regular soda) wait 15 minutes and repeat blood sugar to make sure it comes up above 70.  If your blood sugar is above 70 and you are due for a meal, have a meal.  If you are not due for a meal have a snack.  This snack helps keeps your blood sugar at a safe range.      Diagnosis: Hypothyroidism  Assessment and Plan of Treatment: you do not make enough thyroid hormone  signs & symptoms of low levels of thyroid hormone - tired, getting cold easily, coarse or thin hair, constipation, shortness of breath, swelling, irregular periods  your doctor will do thyroid hormone blood tests at least once a year to monitor if medication dose is adequate  take your thyroid medicine as directed by your doctor & on empty stomach  Continue Levothyroxine to 150 mcg daily. Please give in morning, in an empty stomach and wait for 30 minutes to give breakfast  Check TSH, Free T4, Total T3 on 6th Jan on Friday, if patient remains in the hospital or outpatient.   Check TPO antibody to rule out Hashimoto's disease

## 2022-12-25 NOTE — PROGRESS NOTE ADULT - PROBLEM SELECTOR PLAN 6
Continue sliding scale coverage.  Monitor glucose. K 3.2  replaced po supplement, give x 3days  pt takes Lasix. will adjust dose if no improvement.   Monitor serum potassium.

## 2022-12-25 NOTE — PROGRESS NOTE ADULT - SUBJECTIVE AND OBJECTIVE BOX
ARNOLDO PUTNAM    SCU progress note    INTERVAL HPI/OVERNIGHT EVENTS: ***    DNR [ ]   DNI  [  ]    Covid - 19 PCR:     The 4Ms    What Matters Most: see GOC  Age appropriate Medications/Screen for High Risk Medication: Yes  Mentation: see CAM below  Mobility: defer to physical exam    The Confusion Assessment Method (CAM) Diagnostic Algorithm     1: Acute Onset or Fluctuating Course  - Is there evidence of an acute change in mental status from the patient’s baseline? Did the (abnormal) behavior  fluctuate during the day, that is, tend to come and go, or increase and decrease in severity?  [ ] YES [x ] NO     2: Inattention  - Did the patient have difficulty focusing attention, being easily distractible, or having difficulty keeping track of what was being said?  [ ] YES [ x] NO     3: Disorganized thinking  -Was the patient’s thinking disorganized or incoherent, such as rambling or irrelevant conversation, unclear or illogical flow of ideas, or unpredictable switching from subject to subject?  [ ] YES [ x] NO    4: Altered Level of consciousness?  [ ] YES [ x] NO    The diagnosis of delirium by CAM requires the presence of features 1 and 2 and either 3 or 4.    PRESSORS: [ ] YES [x ] NO  ampicillin/sulbactam  IVPB 3 Gram(s) IV Intermittent every 6 hours    Cardiovascular:  Heart Failure  Acute   Acute on Chronic  Chronic       amLODIPine   Tablet 10 milliGRAM(s) Oral daily  furosemide    Tablet 40 milliGRAM(s) Oral daily    Pulmonary:  albuterol    90 MICROgram(s) HFA Inhaler 2 Puff(s) Inhalation every 6 hours  budesonide 160 MICROgram(s)/formoterol 4.5 MICROgram(s) Inhaler 2 Puff(s) Inhalation two times a day    Hematalogic:  aspirin  chewable 81 milliGRAM(s) Oral daily  enoxaparin Injectable 40 milliGRAM(s) SubCutaneous every 24 hours    Other:  acetaminophen     Tablet .. 650 milliGRAM(s) Oral every 6 hours PRN  artificial tears (preservative free) Ophthalmic Solution 1 Drop(s) Both EYES two times a day  insulin lispro (ADMELOG) corrective regimen sliding scale   SubCutaneous three times a day before meals  insulin lispro (ADMELOG) corrective regimen sliding scale   SubCutaneous at bedtime  lactulose Syrup 20 Gram(s) Oral two times a day  levothyroxine 200 MICROGram(s) Oral daily  methylPREDNISolone sodium succinate Injectable 40 milliGRAM(s) IV Push every 8 hours  OLANZapine 10 milliGRAM(s) Oral daily    acetaminophen     Tablet .. 650 milliGRAM(s) Oral every 6 hours PRN  albuterol    90 MICROgram(s) HFA Inhaler 2 Puff(s) Inhalation every 6 hours  amLODIPine   Tablet 10 milliGRAM(s) Oral daily  ampicillin/sulbactam  IVPB 3 Gram(s) IV Intermittent every 6 hours  artificial tears (preservative free) Ophthalmic Solution 1 Drop(s) Both EYES two times a day  aspirin  chewable 81 milliGRAM(s) Oral daily  budesonide 160 MICROgram(s)/formoterol 4.5 MICROgram(s) Inhaler 2 Puff(s) Inhalation two times a day  enoxaparin Injectable 40 milliGRAM(s) SubCutaneous every 24 hours  furosemide    Tablet 40 milliGRAM(s) Oral daily  insulin lispro (ADMELOG) corrective regimen sliding scale   SubCutaneous three times a day before meals  insulin lispro (ADMELOG) corrective regimen sliding scale   SubCutaneous at bedtime  lactulose Syrup 20 Gram(s) Oral two times a day  levothyroxine 200 MICROGram(s) Oral daily  methylPREDNISolone sodium succinate Injectable 40 milliGRAM(s) IV Push every 8 hours  OLANZapine 10 milliGRAM(s) Oral daily    Drug Dosing Weight  Height (cm): 157.5 (24 Dec 2022 06:37)  Weight (kg): 93.4 (23 Dec 2022 17:28)  BMI (kg/m2): 37.7 (24 Dec 2022 06:37)  BSA (m2): 1.94 (24 Dec 2022 06:37)    CENTRAL LINE: [ ] YES [x ] NO  LOCATION:   DATE INSERTED:  REMOVE: [ ] YES [ ] NO  EXPLAIN:    CASTRO: [ ] YES [x ] NO    DATE INSERTED:  REMOVE:  [ ] YES [ ] NO  EXPLAIN:    PAST MEDICAL & SURGICAL HISTORY:  HTN (hypertension)  Schizo affective schizophrenia  Bipolar 1 disorder  Hypothyroidism  Arthritis  Hypercholesteremia  Chronic obstructive pulmonary disease, unspecified COPD type  Chronic diastolic congestive heart failure  Interstitial lung disease  Poor historian  No significant past surgical history    ABG - ( 25 Dec 2022 05:29 )  pH, Arterial: 7.40  pH, Blood: x     /  pCO2: 50    /  pO2: 103   / HCO3: 31    / Base Excess: 5.0   /  SaO2: 99        PHYSICAL EXAM:    GENERAL: NAD, obese, well-groomed, well-developed  HEAD:  Atraumatic, Normocephalic  EYES: EOMI, PERRLA, conjunctiva and sclera clear +Redness under left eye.   ENMT: No tonsillar erythema, exudates, or enlargement; Moist mucous membranes, Good dentition, No lesions  NECK: Supple, No JVD, Normal thyroid  NERVOUS SYSTEM:  Alert & Oriented X person and place, easily forgetful,  able to move all extremites  CHEST/LUNG: Diminished breath sounds bilaterally with a few scattered rhonchi R>L.  HEART: Regular rate and rhythm; No murmurs, rubs, or gallops  ABDOMEN: Soft, Nontender, Nondistended; Bowel sounds present  EXTREMITIES: +1 edema bilateral extremities.  2+ Peripheral Pulses, No clubbing, cyanosis  LYMPH: No lymphadenopathy noted  SKIN: +Redness under left eye. Bilateral lower extremities erythema/few ulcers noted.     LABS:  CBC Full  -  ( 24 Dec 2022 04:45 )  WBC Count : 6.36 K/uL  RBC Count : 4.70 M/uL  Hemoglobin : 12.2 g/dL  Hematocrit : 38.1 %  Platelet Count - Automated : 210 K/uL  Mean Cell Volume : 81.1 fl  Mean Cell Hemoglobin : 26.0 pg  Mean Cell Hemoglobin Concentration : 32.0 gm/dL    12-    141  |  104  |  18  ----------------------------<  221<H>  3.7   |  28  |  0.71    Ca    8.8      24 Dec 2022 04:45  Phos  4.2     12-24  Mg     1.9     12-24    TPro  7.1  /  Alb  2.5<L>  /  TBili  0.4  /  DBili  x   /  AST  14  /  ALT  14  /  AlkPhos  73  12-      Urinalysis Basic - ( 24 Dec 2022 04:10 )    Color: Yellow / Appearance: Clear / S.015 / pH: x  Gluc: x / Ketone: Small  / Bili: Negative / Urobili: Negative   Blood: x / Protein: 100 / Nitrite: Negative   Leuk Esterase: Negative / RBC: 5-10 /HPF / WBC 0-2 /HPF   Sq Epi: x / Non Sq Epi: Occasional /HPF / Bacteria: Trace /HPF    [  ]  DVT Prophylaxis  [  ]  Nutrition, Brand, Rate         Goal Rate        Abnormal Nutritional Status -  Malnutrition   Cachexia      Morbid Obesity BMI >/=40    RADIOLOGY & ADDITIONAL STUDIES:    < from: Xray Chest 1 View-PORTABLE IMMEDIATE (22 @ 18:52) >    ACC: [id number] EXAM:  XR CHEST PORTABLE IMMED 1V                          PROCEDURE DATE:  2022    INTERPRETATION:  AP chest.    CLINICAL INDICATION: Shortness of breath.  IMPRESSION: There is mild patchy opacification the lungs appears   compatible with mild pulmonary edema. The heart is normal in size. There   are severe degenerative changes of the shoulders bilaterally.  --- End of Report ---    AMIE QUEEN MD; Attending Radiologist  This document has been electronically signed. Dec 23 2022 10:44PM    < end of copied text >  < from: CT Head No Cont (22 @ 21:19) >    IMPRESSION: No intracranial hemorrhage or mass effect. Left   frontotemporal scalp hematoma.    < end of copied text >      Goals of Care Discussion with Family/Proxy/Other   - see family update note     ARNOLDO PUTNAM    SCU progress note    INTERVAL HPI/OVERNIGHT EVENTS: no overnight event, pt remains comfortable in bed, on 2L via NC    DNR [ ]   DNI  [  ] full code [x]    Covid - 19 PCR: negative    The 4Ms    What Matters Most: see GOC  Age appropriate Medications/Screen for High Risk Medication: Yes  Mentation: see CAM below  Mobility: defer to physical exam    The Confusion Assessment Method (CAM) Diagnostic Algorithm     1: Acute Onset or Fluctuating Course  - Is there evidence of an acute change in mental status from the patient’s baseline? Did the (abnormal) behavior  fluctuate during the day, that is, tend to come and go, or increase and decrease in severity?  [ ] YES [x ] NO     2: Inattention  - Did the patient have difficulty focusing attention, being easily distractible, or having difficulty keeping track of what was being said?  [ ] YES [ x] NO     3: Disorganized thinking  -Was the patient’s thinking disorganized or incoherent, such as rambling or irrelevant conversation, unclear or illogical flow of ideas, or unpredictable switching from subject to subject?  [ ] YES [ x] NO    4: Altered Level of consciousness?  [ ] YES [ x] NO    The diagnosis of delirium by CAM requires the presence of features 1 and 2 and either 3 or 4.    PRESSORS: [ ] YES [x ] NO  ampicillin/sulbactam  IVPB 3 Gram(s) IV Intermittent every 6 hours    Cardiovascular:  Heart Failure  Acute   Acute on Chronic  Chronic       amLODIPine   Tablet 10 milliGRAM(s) Oral daily  furosemide    Tablet 40 milliGRAM(s) Oral daily    Pulmonary:  albuterol    90 MICROgram(s) HFA Inhaler 2 Puff(s) Inhalation every 6 hours  budesonide 160 MICROgram(s)/formoterol 4.5 MICROgram(s) Inhaler 2 Puff(s) Inhalation two times a day    Hematalogic:  aspirin  chewable 81 milliGRAM(s) Oral daily  enoxaparin Injectable 40 milliGRAM(s) SubCutaneous every 24 hours    Other:  acetaminophen     Tablet .. 650 milliGRAM(s) Oral every 6 hours PRN  artificial tears (preservative free) Ophthalmic Solution 1 Drop(s) Both EYES two times a day  insulin lispro (ADMELOG) corrective regimen sliding scale   SubCutaneous three times a day before meals  insulin lispro (ADMELOG) corrective regimen sliding scale   SubCutaneous at bedtime  lactulose Syrup 20 Gram(s) Oral two times a day  levothyroxine 200 MICROGram(s) Oral daily  methylPREDNISolone sodium succinate Injectable 40 milliGRAM(s) IV Push every 8 hours  OLANZapine 10 milliGRAM(s) Oral daily    acetaminophen     Tablet .. 650 milliGRAM(s) Oral every 6 hours PRN  albuterol    90 MICROgram(s) HFA Inhaler 2 Puff(s) Inhalation every 6 hours  amLODIPine   Tablet 10 milliGRAM(s) Oral daily  ampicillin/sulbactam  IVPB 3 Gram(s) IV Intermittent every 6 hours  artificial tears (preservative free) Ophthalmic Solution 1 Drop(s) Both EYES two times a day  aspirin  chewable 81 milliGRAM(s) Oral daily  budesonide 160 MICROgram(s)/formoterol 4.5 MICROgram(s) Inhaler 2 Puff(s) Inhalation two times a day  enoxaparin Injectable 40 milliGRAM(s) SubCutaneous every 24 hours  furosemide    Tablet 40 milliGRAM(s) Oral daily  insulin lispro (ADMELOG) corrective regimen sliding scale   SubCutaneous three times a day before meals  insulin lispro (ADMELOG) corrective regimen sliding scale   SubCutaneous at bedtime  lactulose Syrup 20 Gram(s) Oral two times a day  levothyroxine 200 MICROGram(s) Oral daily  methylPREDNISolone sodium succinate Injectable 40 milliGRAM(s) IV Push every 8 hours  OLANZapine 10 milliGRAM(s) Oral daily    Drug Dosing Weight  Height (cm): 157.5 (24 Dec 2022 06:37)  Weight (kg): 93.4 (23 Dec 2022 17:28)  BMI (kg/m2): 37.7 (24 Dec 2022 06:37)  BSA (m2): 1.94 (24 Dec 2022 06:37)    CENTRAL LINE: [ ] YES [x ] NO  LOCATION:   DATE INSERTED:  REMOVE: [ ] YES [ ] NO  EXPLAIN:    CASTRO: [ ] YES [x ] NO    DATE INSERTED:  REMOVE:  [ ] YES [ ] NO  EXPLAIN:    PAST MEDICAL & SURGICAL HISTORY:  HTN (hypertension)  Schizo affective schizophrenia  Bipolar 1 disorder  Hypothyroidism  Arthritis  Hypercholesteremia  Chronic obstructive pulmonary disease, unspecified COPD type  Chronic diastolic congestive heart failure  Interstitial lung disease  Poor historian  No significant past surgical history    ABG - ( 25 Dec 2022 05:29 )  pH, Arterial: 7.40  pH, Blood: x     /  pCO2: 50    /  pO2: 103   / HCO3: 31    / Base Excess: 5.0   /  SaO2: 99        PHYSICAL EXAM:    GENERAL: NAD, obese, well-groomed, well-developed  HEAD:  Atraumatic, Normocephalic  EYES: EOMI, PERRLA, conjunctiva and sclera clear +Redness under left eye.   ENMT: No tonsillar erythema, exudates, or enlargement; Moist mucous membranes, Good dentition, No lesions  NECK: Supple, No JVD, Normal thyroid  NERVOUS SYSTEM:  Alert & Oriented X person and place, easily forgetful,  able to move all extremites  CHEST/LUNG: Diminished breath sounds bilaterally with a few scattered rhonchi R>L.  HEART: Regular rate and rhythm; No murmurs, rubs, or gallops  ABDOMEN: Soft, Nontender, Nondistended; Bowel sounds present  EXTREMITIES: +1 edema bilateral extremities.  2+ Peripheral Pulses, No clubbing, cyanosis  LYMPH: No lymphadenopathy noted  SKIN: +Redness under left eye. Bilateral lower extremities erythema/few ulcers noted.     LABS:  CBC Full  -  ( 24 Dec 2022 04:45 )  WBC Count : 6.36 K/uL  RBC Count : 4.70 M/uL  Hemoglobin : 12.2 g/dL  Hematocrit : 38.1 %  Platelet Count - Automated : 210 K/uL  Mean Cell Volume : 81.1 fl  Mean Cell Hemoglobin : 26.0 pg  Mean Cell Hemoglobin Concentration : 32.0 gm/dL        141  |  104  |  18  ----------------------------<  221<H>  3.7   |  28  |  0.71    Ca    8.8      24 Dec 2022 04:45  Phos  4.2     -  Mg     1.9     -    TPro  7.1  /  Alb  2.5<L>  /  TBili  0.4  /  DBili  x   /  AST  14  /  ALT  14  /  AlkPhos  73        Urinalysis Basic - ( 24 Dec 2022 04:10 )    Color: Yellow / Appearance: Clear / S.015 / pH: x  Gluc: x / Ketone: Small  / Bili: Negative / Urobili: Negative   Blood: x / Protein: 100 / Nitrite: Negative   Leuk Esterase: Negative / RBC: 5-10 /HPF / WBC 0-2 /HPF   Sq Epi: x / Non Sq Epi: Occasional /HPF / Bacteria: Trace /HPF    [  ]  DVT Prophylaxis  [  ]  Nutrition, Brand, Rate         Goal Rate        Abnormal Nutritional Status -  Malnutrition   Cachexia      Morbid Obesity BMI >/=40    RADIOLOGY & ADDITIONAL STUDIES:    < from: Xray Chest 1 View-PORTABLE IMMEDIATE (22 @ 18:52) >    ACC: [id number] EXAM:  XR CHEST PORTABLE IMMED 1V                          PROCEDURE DATE:  2022    INTERPRETATION:  AP chest.    CLINICAL INDICATION: Shortness of breath.  IMPRESSION: There is mild patchy opacification the lungs appears   compatible with mild pulmonary edema. The heart is normal in size. There   are severe degenerative changes of the shoulders bilaterally.  --- End of Report ---    AMIE QUEEN MD; Attending Radiologist  This document has been electronically signed. Dec 23 2022 10:44PM    < end of copied text >  < from: CT Head No Cont (22 @ 21:19) >    IMPRESSION: No intracranial hemorrhage or mass effect. Left   frontotemporal scalp hematoma.    < end of copied text >      Goals of Care Discussion with Family/Proxy/Other   - see family update note

## 2022-12-25 NOTE — PROGRESS NOTE ADULT - PROBLEM SELECTOR PLAN 9
Continue zyprexa 10mg daily.  Hold home dose of ativan (0.5mg HS)  for now  Maintain safety measures. Continue synthroid.  f/u Free T4 and TSH

## 2022-12-25 NOTE — CHART NOTE - NSCHARTNOTEFT_GEN_A_CORE
Called listed contact Dina Miller (neighbor), [phone], spoke and updated pt condition briefly.  She states her  (same name like son Gómez) is not interested in discussion of pt condition. Son Jeramy Richardson has mental disability in special program and cannot discuss about pt care per Angela. Called listed contact Dina Miller (neighbor), [phone], spoke and updated pt condition briefly.  She states her ex- (same name like son Gómez) is not interested in discussion of pt condition. Son Jeramy Richardson has mental disability in special program and cannot discuss about pt care per Angela.

## 2022-12-25 NOTE — PROGRESS NOTE ADULT - PROBLEM SELECTOR PLAN 7
Continue amlodipine and lasix.  Statin, ASA,  DASH carb consistent diet. Continue sliding scale coverage.  Monitor glucose.

## 2022-12-25 NOTE — DISCHARGE NOTE PROVIDER - HOSPITAL COURSE
77 year old F with PMH of HTN, hypothyroidism, CHF, COPD, bipolar d/o, schizoaffective d/o, DM presents after fall. Pt reports she fell from her wheelchair, denies LOC but reports head trauma and left lower leg trauma and is now pain free. Denies pain, fever, chills, shortness of breath, cough, wheezing, chest pain, lightheadedness, dizziness, n/v/d, changes in urinary or bowel habits.    ED Course:  Vitals: /69 P 120 R 18 T 98.2F > 102.2F  SpO2 97% RA  Meds: Unasyn, solumedrol 125 mg IV, Duonebs x 2    Patient is admitted to  for  COPD exacerbation and s/p fall at nursing home. CXR shows mild patchy opacification the lungs appears compatible with mild pulmonary edema.  CTH shows +Hematome forehead. No bleeding or midline shift noted.    Pt is unable to titrate oxygen off this time-86% on room air. Placed on 2L NC.   continues Unasyn for lower leg cellulitis.     incomplete 12/25   77 year old F with PMH of HTN, hypothyroidism, CHF, COPD, bipolar d/o, schizoaffective d/o, DM presents after fall. Pt reports she fell from her wheelchair, denies LOC but reports head trauma and left lower leg trauma and is now pain free. Denies pain, fever, chills, shortness of breath, cough, wheezing, chest pain, lightheadedness, dizziness, n/v/d, changes in urinary or bowel habits.    ED Course:  Vitals: /69 P 120 R 18 T 98.2F > 102.2F  SpO2 97% RA  Meds: Unasyn, solumedrol 125 mg IV, Duonebs x 2  Patient is admitted to  for  COPD exacerbation and s/p fall at nursing home. CXR shows mild patchy opacification the lungs appears compatible with mild pulmonary edema.  CTH shows +Hematome forehead. No bleeding or midline shift noted.    Pt is unable to titrate oxygen off this time-86% on room air. Placed on 2L NC.    12/26 Medically stable, downgraded to medicine under dr. Leavitt service (covering  MD dr. Cordova today)  PT consulted, IV solumedrol changed to po. D/C'd Unasyn.   Family refuses pt back to [facility],  to follow up to facilitate different placement.   12/27- Pt remained stable.  Labs reviewed for past few days, CXR w/ pulm edema and responding well to PO lasix.  Na is slightly elevated, would not start treatment for now in setting of pulm edema since no changes in mentation.  Hence, f/u AM BMP.  Cards following, pending TTE.  PT recs [facility].        incomplete 12/25   77 year old F with PMH of HTN, hypothyroidism, CHF, COPD, bipolar d/o, schizoaffective d/o, DM presents after fall. Pt reports she fell from her wheelchair, denies LOC but reports head trauma and left lower leg trauma and is now pain free. Denies pain, fever, chills, shortness of breath, cough, wheezing, chest pain, lightheadedness, dizziness, n/v/d, changes in urinary or bowel habits.    ED Course:  Vitals: /69 P 120 R 18 T 98.2F > 102.2F  SpO2 97% RA  Meds: Unasyn, solumedrol 125 mg IV, Duonebs x 2  Patient is admitted to  for  COPD exacerbation and s/p fall at nursing home. CXR shows mild patchy opacification the lungs appears compatible with mild pulmonary edema.  CTH shows +Hematome forehead. No bleeding or midline shift noted.    Pt is unable to titrate oxygen off this time-86% on room air. Placed on 2L NC.    12/26 Medically stable, downgraded to medicine under dr. Leavitt service (covering  MD dr. Cordova today)  PT consulted, IV solumedrol changed to po. D/C'd Unasyn.   Family refuses pt back to [facility],  to follow up to facilitate different placement.   12/27- Pt remained stable.  Labs reviewed for past few days, CXR w/ pulm edema and responding well to PO lasix.  Na is slightly elevated, would not start treatment for now in setting of pulm edema since no changes in mentation.  Hence, f/u AM BMP.   LABS:                        12.6   14.22 )-----------( 286      ( 30 Dec 2022 07:52 )             41.1     12-30    142  |  103  |  21<H>  ----------------------------<  166<H>  3.7   |  34<H>  |  0.63    Ca    8.9      30 Dec 2022 07:52  Phos  3.7     12-30  Mg     2.3     12-30    TPro  6.5  /  Alb  2.5<L>  /  TBili  0.3  /  DBili  x   /  AST  11  /  ALT  33  /  AlkPhos  69  12-30  < from: CT Head No Cont (12.23.22 @ 21:19) >    FINDINGS: No intracranial hemorrhage is seen. The grey-white   differentiation is maintained. Mild periventricular and subcortical white   matter hypoattenuation without mass effect is noted, non-specific, but   likely related to chronic small vessel ischemic changes.    Cerebral volume loss is present with proportional prominence of the sulci   and ventricles. No mass effect or midline shift is seen. Basal cisterns   are not effaced.    The visualized paranasal sinuses and tympanomastoid cavities are clear.   Evidence of prior bilateral ocular lens surgery.    Left frontotemporal scalp hematoma. No displaced calvarial fracture.    IMPRESSION: No intracranial hemorrhage or mass effect. Left   frontotemporal scalp hematoma.    --- End of Report ---    < end of copied text >    < from: Xray Hip 2-3 Views, Left (12.28.22 @ 10:37) >    FINDINGS:  3 views of theleft hip are submitted.  Redemonstration of chronic neck fracture of the left hip with chronic   dislocation of the left femoral head.  Redemonstration of chronic   atrophy/resorption of of left femoral head. 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 Probable chronic, post-traumatic, small, heterotopic ossifications   at superolateral aspect of the let pseudoacetabulum and adjacent to left   greater trochanter. No clear-cut acute bony fracture.  Partial visualization of the pubic screws and the pubic symphysis area.    IMPRESSION:  Sequelae of chronic left neck fracture-dislocation at the left hip.  No clear-cut new fracture seen.    --- End of Report ---    < end of copied text >             77 year old F with PMH of HTN, hypothyroidism, CHF, COPD, bipolar d/o, schizoaffective d/o, DM presents after fall.     ED Course:  Vitals: /69 P 120 R 18 T 98.2F > 102.2F  SpO2 97% RA  Meds: Unasyn, solumedrol 125 mg IV, Duonebs x 2  Patient is admitted to  for  COPD exacerbation and s/p fall at nursing home. CXR shows mild patchy opacification the lungs appears compatible with mild pulmonary edema.  CTH shows +Hematome forehead. No bleeding or midline shift noted.    Pt is unable to titrate oxygen off this time-86% on room air. Placed on 2L NC.    12/26 Medically stable, downgraded to medicine  PT consulted [facility], IV solumedrol changed to po. D/C'd Unasyn.   Family refuses pt back to [facility],  pt. pending acceptance to [facility].      Please note that this a brief summary of hospital course please refer to daily progress notes and consult notes for full course and events     77 year old F with PMH of HTN, hypothyroidism, CHF, COPD, bipolar d/o, schizoaffective d/o, DM presents after fall.     ED Course:  Vitals: /69 P 120 R 18 T 98.2F > 102.2F  SpO2 97% RA  Meds: Unasyn, solumedrol 125 mg IV, Duonebs x 2  Patient is admitted to  for  COPD exacerbation and s/p fall at nursing home. CXR shows mild patchy opacification the lungs appears compatible with mild pulmonary edema.  CTH shows +Hematome forehead. No bleeding or midline shift noted.    Pt is unable to titrate oxygen off this time-86% on room air. Placed on 2L NC.    12/26 Medically stable, downgraded to medicine  PT consulted rec [facility], IV solumedrol changed to po. D/C'd Unasyn.   Family refuses pt back to [facility] initially, however, emergency contact ms. Miller agreed to d/c back to [facility], thus, pt d/c to facility.        Please note that this a brief summary of hospital course please refer to daily progress notes and consult notes for full course and events     77 year old F with PMH of HTN, hypothyroidism, CHF, COPD, bipolar d/o, schizoaffective d/o, DM presents after fall.     ED Course:  Vitals: /69 P 120 R 18 T 98.2F > 102.2F  SpO2 97% RA  Meds: Unasyn, solumedrol 125 mg IV, Duonebs x 2  Patient is admitted to  for  COPD exacerbation and s/p fall at nursing home. CXR shows mild patchy opacification the lungs appears compatible with mild pulmonary edema.  CTH shows +Hematome forehead. No bleeding or midline shift noted.    Pt is unable to titrate oxygen off this time-86% on room air. Placed on 2L NC.  12/26 Medically stable, downgraded to medicine. Pt has been stable on the floor without changing in her mental status.   PT consulted [facility], IV solumedrol changed to po.  Family refuses pt back to [facility] initially, however, emergency contact ms. Miller agreed to d/c back to [facility], thus, pt d/c to facility.        Please note that this a brief summary of hospital course please refer to daily progress notes and consult notes for full course and events

## 2022-12-25 NOTE — PROGRESS NOTE ADULT - PROBLEM SELECTOR PLAN 8
Continue synthroid.  f/u Free T4 and TSH Continue amlodipine and lasix.  Statin, ASA,  DASH carb consistent diet.

## 2022-12-25 NOTE — PROGRESS NOTE ADULT - NS ATTEND AMEND GEN_ALL_CORE FT
Problem/Plan - 1:  ·  Problem: Sepsis, unspecified organism.   ·  Plan: Admitted with fever 102.2  Given vanco and Unasyn in ED.  CXR shows There is mild patchy opacification the lungs appears compatible with mild pulmonary edema.     Continue Unasyn.  Serum lactate normal.  F/U blood cultures and urine culture.   +Cellulitis bilateral lower extremities.     Problem/Plan - 2:  ·  Problem: Fall.   ·  Plan: S/P mechanical fall at NH  +trauma/hematoma to forehead. Denying any pain.  CT +Hematome forehead. No bleeding or midline shift noted on CT scan.  Monitor size of hematoma.  Tylenol only for pain.  Maintain fall and safety measures.  Monitor neuro status.     Problem/Plan - 3:  ·  Problem: Cellulitis of both lower extremities.   ·  Plan: +Cellulitis bilateral lower extremities.  Continue Unasyn.  F/U blood cultures and urine culture.    monitor improvement.     Problem/Plan - 4:  ·  Problem: COPD exacerbation.   ·  Plan: Known COPD  Uses Breo and albuterol at facility.  Does not use oxygen or BIPAP  Presented with significant wheezing in ED  CXR shows mild patchy opacification the lungs appears compatible with mild pulmonary edema.     Continue bronchodilators and ICS  Continue oxygen support. Currently tolerating 2LPM. Monitor oxygen saturation  Continue solumedrol 40mg every 8 hours.  Titrate Oxygen as needed.

## 2022-12-25 NOTE — DISCHARGE NOTE PROVIDER - NSDCMRMEDTOKEN_GEN_ALL_CORE_FT
albuterol 90 mcg/inh inhalation aerosol: 1 puff(s) inhaled every 6 hours  aspirin 325 mg oral tablet: 1 tab(s) orally once a day  Breo Ellipta 200 mcg-25 mcg/inh inhalation powder: 1 puff(s) inhaled once a day  furosemide 20 mg oral tablet: 2 tab(s) orally once a day  lactulose 10 g/15 mL oral syrup: 30 milliliter(s) orally 2 times a day  metFORMIN 500 mg oral tablet: 2 tab(s) orally 2 times a day  multivitamin:     Norvasc 10 mg oral tablet: 1 tab(s) orally once a day  Synthroid 200 mcg (0.2 mg) oral tablet: 1 tab(s) orally once a day  Systane ophthalmic solution: 1 drop(s) to each affected eye 2 times a day  ZyPREXA 10 mg oral tablet: 1 tab(s) orally once a day   albuterol 90 mcg/inh inhalation aerosol: 1 puff(s) inhaled every 6 hours  aspirin 325 mg oral tablet: 1 tab(s) orally once a day  Breo Ellipta 200 mcg-25 mcg/inh inhalation powder: 1 puff(s) inhaled once a day  furosemide 20 mg oral tablet: 2 tab(s) orally once a day  lactulose 10 g/15 mL oral syrup: 30 milliliter(s) orally 2 times a day  metFORMIN 500 mg oral tablet: 2 tab(s) orally 2 times a day  multivitamin:     Norvasc 10 mg oral tablet: 1 tab(s) orally once a day  Synthroid 200 mcg (0.2 mg) oral tablet: 1 tab(s) orally once a day  Systane ophthalmic solution: 1 drop(s) to each affected eye 2 times a day  traMADol 50 mg oral tablet: 0.5 tab(s) orally every 6 hours, As needed, Moderate Pain (4 - 6)  traMADol 50 mg oral tablet: 1 tab(s) orally every 6 hours, As needed, Severe Pain (7 - 10)  ZyPREXA 10 mg oral tablet: 1 tab(s) orally once a day   albuterol 90 mcg/inh inhalation aerosol: 1 puff(s) inhaled every 6 hours  aspirin 325 mg oral tablet: 1 tab(s) orally once a day  Breo Ellipta 200 mcg-25 mcg/inh inhalation powder: 1 puff(s) inhaled once a day  furosemide 20 mg oral tablet: 2 tab(s) orally once a day  lactulose 10 g/15 mL oral syrup: 30 milliliter(s) orally 2 times a day  levothyroxine 150 mcg (0.15 mg) oral tablet: 1 tab(s) orally once a day  metFORMIN 500 mg oral tablet: 2 tab(s) orally 2 times a day  multivitamin: 1 tab(s) orally once a day  Norvasc 10 mg oral tablet: 1 tab(s) orally once a day  Systane ophthalmic solution: 1 drop(s) to each affected eye 2 times a day  traMADol 50 mg oral tablet: 0.5 tab(s) orally every 6 hours, As needed, Moderate Pain (4 - 6)  traMADol 50 mg oral tablet: 1 tab(s) orally every 6 hours, As needed, Severe Pain (7 - 10)  ZyPREXA 10 mg oral tablet: 1 tab(s) orally once a day

## 2022-12-25 NOTE — PROGRESS NOTE ADULT - ASSESSMENT
77 year old F with PMH of HTN, hypothyroidism, CHF, COPD, bipolar d/o, schizoaffective d/o, DM presents after fall. Pt reports she fell from her wheelchair, denies LOC but reports head trauma and left lower leg trauma and is now pain free. Denies pain, fever, chills, shortness of breath, cough, wheezing, chest pain, lightheadedness, dizziness, n/v/d, changes in urinary or bowel habits.    ED Course:  Vitals: /69 P 120 R 18 T 98.2F > 102.2F  SpO2 97% RA  Meds: Unasyn, solumedrol 125 mg IV, Duonebs x 2    Patient is admitted to  for  COPD exacerbation and s/p fall at nursing home. CXR shows mild patchy opacification the lungs appears compatible with mild pulmonary edema.  CTH shows +Hematome forehead. No bleeding or midline shift noted.    Pt is unable to titrate oxygen off this time-86% on room air. Placed on 2L NC.   continues Unasyn for lower leg cellulitis.

## 2022-12-25 NOTE — PROGRESS NOTE ADULT - PROBLEM SELECTOR PLAN 10
From [facility]   DVT and GI prophylaxis.  Monitor neurologic status and hematoma.  Continue oxygen support. Currently on 2LPM, Titrated off oxygen as needed.  Continue Unasyn.  Bronchodilators and ICS  IV steroids Continue zyprexa 10mg daily.  Hold home dose of ativan (0.5mg HS)  for now  Maintain safety measures.

## 2022-12-25 NOTE — PROGRESS NOTE ADULT - PROBLEM SELECTOR PLAN 11
From [facility]   DVT and GI prophylaxis.  Monitor neurologic status and hematoma.  Continue oxygen support. Currently on 2LPM, Titrated off oxygen as needed.  Continue Unasyn.  Bronchodilators and ICS  IV steroids

## 2022-12-26 DIAGNOSIS — I87.2 VENOUS INSUFFICIENCY (CHRONIC) (PERIPHERAL): ICD-10-CM

## 2022-12-26 LAB
ALBUMIN SERPL ELPH-MCNC: 2.8 G/DL — LOW (ref 3.5–5)
ALP SERPL-CCNC: 67 U/L — SIGNIFICANT CHANGE UP (ref 40–120)
ALT FLD-CCNC: 22 U/L DA — SIGNIFICANT CHANGE UP (ref 10–60)
ANION GAP SERPL CALC-SCNC: 6 MMOL/L — SIGNIFICANT CHANGE UP (ref 5–17)
AST SERPL-CCNC: 14 U/L — SIGNIFICANT CHANGE UP (ref 10–40)
BILIRUB SERPL-MCNC: 0.3 MG/DL — SIGNIFICANT CHANGE UP (ref 0.2–1.2)
BUN SERPL-MCNC: 20 MG/DL — HIGH (ref 7–18)
CALCIUM SERPL-MCNC: 9 MG/DL — SIGNIFICANT CHANGE UP (ref 8.4–10.5)
CHLORIDE SERPL-SCNC: 106 MMOL/L — SIGNIFICANT CHANGE UP (ref 96–108)
CO2 SERPL-SCNC: 32 MMOL/L — HIGH (ref 22–31)
CREAT SERPL-MCNC: 0.66 MG/DL — SIGNIFICANT CHANGE UP (ref 0.5–1.3)
EGFR: 91 ML/MIN/1.73M2 — SIGNIFICANT CHANGE UP
GLUCOSE BLDC GLUCOMTR-MCNC: 177 MG/DL — HIGH (ref 70–99)
GLUCOSE BLDC GLUCOMTR-MCNC: 179 MG/DL — HIGH (ref 70–99)
GLUCOSE BLDC GLUCOMTR-MCNC: 193 MG/DL — HIGH (ref 70–99)
GLUCOSE BLDC GLUCOMTR-MCNC: 246 MG/DL — HIGH (ref 70–99)
GLUCOSE SERPL-MCNC: 223 MG/DL — HIGH (ref 70–99)
HCT VFR BLD CALC: 39.7 % — SIGNIFICANT CHANGE UP (ref 34.5–45)
HGB BLD-MCNC: 12.4 G/DL — SIGNIFICANT CHANGE UP (ref 11.5–15.5)
MAGNESIUM SERPL-MCNC: 2.4 MG/DL — SIGNIFICANT CHANGE UP (ref 1.6–2.6)
MCHC RBC-ENTMCNC: 25.9 PG — LOW (ref 27–34)
MCHC RBC-ENTMCNC: 31.2 GM/DL — LOW (ref 32–36)
MCV RBC AUTO: 82.9 FL — SIGNIFICANT CHANGE UP (ref 80–100)
NRBC # BLD: 0 /100 WBCS — SIGNIFICANT CHANGE UP (ref 0–0)
PHOSPHATE SERPL-MCNC: 2.4 MG/DL — LOW (ref 2.5–4.5)
PLATELET # BLD AUTO: 229 K/UL — SIGNIFICANT CHANGE UP (ref 150–400)
POTASSIUM SERPL-MCNC: 3.8 MMOL/L — SIGNIFICANT CHANGE UP (ref 3.5–5.3)
POTASSIUM SERPL-SCNC: 3.8 MMOL/L — SIGNIFICANT CHANGE UP (ref 3.5–5.3)
PROT SERPL-MCNC: 7.5 G/DL — SIGNIFICANT CHANGE UP (ref 6–8.3)
RBC # BLD: 4.79 M/UL — SIGNIFICANT CHANGE UP (ref 3.8–5.2)
RBC # FLD: 17.7 % — HIGH (ref 10.3–14.5)
SODIUM SERPL-SCNC: 144 MMOL/L — SIGNIFICANT CHANGE UP (ref 135–145)
WBC # BLD: 11.63 K/UL — HIGH (ref 3.8–10.5)
WBC # FLD AUTO: 11.63 K/UL — HIGH (ref 3.8–10.5)

## 2022-12-26 PROCEDURE — 99233 SBSQ HOSP IP/OBS HIGH 50: CPT | Mod: FS

## 2022-12-26 RX ORDER — SODIUM,POTASSIUM PHOSPHATES 278-250MG
1 POWDER IN PACKET (EA) ORAL ONCE
Refills: 0 | Status: COMPLETED | OUTPATIENT
Start: 2022-12-26 | End: 2022-12-26

## 2022-12-26 RX ADMIN — Medication 40 MILLIGRAM(S): at 05:51

## 2022-12-26 RX ADMIN — ALBUTEROL 2 PUFF(S): 90 AEROSOL, METERED ORAL at 03:48

## 2022-12-26 RX ADMIN — BUDESONIDE AND FORMOTEROL FUMARATE DIHYDRATE 2 PUFF(S): 160; 4.5 AEROSOL RESPIRATORY (INHALATION) at 10:17

## 2022-12-26 RX ADMIN — ENOXAPARIN SODIUM 40 MILLIGRAM(S): 100 INJECTION SUBCUTANEOUS at 05:52

## 2022-12-26 RX ADMIN — ALBUTEROL 2 PUFF(S): 90 AEROSOL, METERED ORAL at 08:11

## 2022-12-26 RX ADMIN — AMPICILLIN SODIUM AND SULBACTAM SODIUM 200 GRAM(S): 250; 125 INJECTION, POWDER, FOR SUSPENSION INTRAMUSCULAR; INTRAVENOUS at 12:04

## 2022-12-26 RX ADMIN — AMLODIPINE BESYLATE 10 MILLIGRAM(S): 2.5 TABLET ORAL at 05:51

## 2022-12-26 RX ADMIN — Medication 1 DROP(S): at 06:03

## 2022-12-26 RX ADMIN — BUDESONIDE AND FORMOTEROL FUMARATE DIHYDRATE 2 PUFF(S): 160; 4.5 AEROSOL RESPIRATORY (INHALATION) at 22:19

## 2022-12-26 RX ADMIN — Medication 1: at 16:45

## 2022-12-26 RX ADMIN — Medication 200 MICROGRAM(S): at 05:51

## 2022-12-26 RX ADMIN — ALBUTEROL 2 PUFF(S): 90 AEROSOL, METERED ORAL at 15:22

## 2022-12-26 RX ADMIN — ALBUTEROL 2 PUFF(S): 90 AEROSOL, METERED ORAL at 22:19

## 2022-12-26 RX ADMIN — Medication 1: at 08:10

## 2022-12-26 RX ADMIN — Medication 81 MILLIGRAM(S): at 12:04

## 2022-12-26 RX ADMIN — Medication 20 MILLIEQUIVALENT(S): at 12:05

## 2022-12-26 RX ADMIN — OLANZAPINE 10 MILLIGRAM(S): 15 TABLET, FILM COATED ORAL at 12:04

## 2022-12-26 RX ADMIN — Medication 1 PACKET(S): at 10:21

## 2022-12-26 RX ADMIN — Medication 2: at 12:04

## 2022-12-26 RX ADMIN — Medication 40 MILLIGRAM(S): at 05:50

## 2022-12-26 RX ADMIN — Medication 1 DROP(S): at 17:33

## 2022-12-26 RX ADMIN — AMPICILLIN SODIUM AND SULBACTAM SODIUM 200 GRAM(S): 250; 125 INJECTION, POWDER, FOR SUSPENSION INTRAMUSCULAR; INTRAVENOUS at 05:50

## 2022-12-26 NOTE — PROGRESS NOTE ADULT - PROBLEM SELECTOR PLAN 2
S/P mechanical fall at NH  +trauma/hematoma to forehead. Denying any pain.  CT +Hematome forehead. No bleeding or midline shift noted on CT scan.  Monitor size of hematoma.  Tylenol only for pain.  Maintain fall and safety measures.  Monitor neuro status.  PT consulted

## 2022-12-26 NOTE — PROGRESS NOTE ADULT - PROBLEM SELECTOR PLAN 1
Admitted with fever 102.2  Given vanco and Unasyn in ED.  CXR shows There is mild patchy opacification the lungs appears compatible with mild pulmonary edema.     started Unasyn.  Serum lactate normal.  blood cultures and urine culture-NGTD  d/c'd Unasyn

## 2022-12-26 NOTE — PROGRESS NOTE ADULT - NS ATTEND AMEND GEN_ALL_CORE FT
77 year old F with PMH of HTN, hypothyroidism, CHF, COPD, bipolar d/o, schizoaffective d/o, DM presents after fall. Pt reports she fell from her wheelchair, denies LOC but reports head trauma and left lower leg trauma and is now pain free. Denies pain, fever, chills, shortness of breath, cough, wheezing, chest pain, lightheadedness, dizziness, n/v/d, changes in urinary or bowel habits.    Patient was admitted to Acute Illness unit because of COPD exacerbation.     Alert, cooperative, obese woman in NAD while seated in the hospital bed  Vital Signs Last 24 Hrs  T(C): 36.4 (26 Dec 2022 11:49), Max: 37.1 (25 Dec 2022 21:33)  T(F): 97.5 (26 Dec 2022 11:49), Max: 98.8 (25 Dec 2022 21:33)  HR: 103 (26 Dec 2022 11:49) (87 - 110)  BP: 139/73 (26 Dec 2022 11:49) (139/73 - 169/84)  BP(mean): --  RR: 18 (26 Dec 2022 11:49) (18 - 24)  SpO2: 95% (26 Dec 2022 11:49) (94% - 95%)    Parameters below as of 26 Dec 2022 11:49  Patient On (Oxygen Delivery Method): nasal cannula  O2 Flow (L/min): 2  Lungs, bilateral air entry without rales or wheezes  Cor, RRR  Abdomen, soft  Ext stasis changes, bilateral LE  Neurological, intact                          12.4   11.63 )-----------( 229      ( 26 Dec 2022 07:25 )             39.7   12-26    144  |  106  |  20<H>  ----------------------------<  223<H>  3.8   |  32<H>  |  0.66    Ca    9.0      26 Dec 2022 07:25  Phos  2.4     12-26  Mg     2.4     12-26    TPro  7.5  /  Alb  2.8<L>  /  TBili  0.3  /  DBili  x   /  AST  14  /  ALT  22  /  AlkPhos  67  12-26    < from: Xray Chest 1 View-PORTABLE IMMEDIATE (12.23.22 @ 18:52) >    IMPRESSION: There is mild patchy opacification the lungs appears   compatible with mild pulmonary edema. The heart is normal in size. There   are severe degenerative changes of the shoulders bilaterally.    < end of copied text >    < from: 12 Lead ECG (12.23.22 @ 17:45) >      Diagnosis Line Sinus tachycardia  Left axis deviation  Possible Lateral infarct , age undetermined  Abnormal ECG    < end of copied text >        IMP:  COPD exacerbation, improving          CHF seen on CXR, improving          venous stasis changes--this is not cellulitis          wheelchair dependent, likely from deconditioning.  Motor exam in non-focal  Plan:  Discontinue steroids.  Continue inhaler           Echo           PT consultation to consider referral back to [facility] 77 year old F with PMH of HTN, hypothyroidism, CHF, COPD, bipolar d/o, schizoaffective d/o, DM presents after fall. Pt reports she fell from her wheelchair, denies LOC but reports head trauma and left lower leg trauma and is now pain free. Denies pain, fever, chills, shortness of breath, cough, wheezing, chest pain, lightheadedness, dizziness, n/v/d, changes in urinary or bowel habits.    Patient was admitted to Acute Illness unit because of COPD exacerbation.     Alert, cooperative, obese woman in NAD while seated in the hospital bed  Vital Signs Last 24 Hrs  T(C): 36.4 (26 Dec 2022 11:49), Max: 37.1 (25 Dec 2022 21:33)  T(F): 97.5 (26 Dec 2022 11:49), Max: 98.8 (25 Dec 2022 21:33)  HR: 103 (26 Dec 2022 11:49) (87 - 110)  BP: 139/73 (26 Dec 2022 11:49) (139/73 - 169/84)  BP(mean): --  RR: 18 (26 Dec 2022 11:49) (18 - 24)  SpO2: 95% (26 Dec 2022 11:49) (94% - 95%)    Parameters below as of 26 Dec 2022 11:49  Patient On (Oxygen Delivery Method): nasal cannula  O2 Flow (L/min): 2  Lungs, bilateral air entry without rales or wheezes  Cor, RRR  Abdomen, soft  Ext stasis changes, bilateral LE  Neurological, intact                          12.4   11.63 )-----------( 229      ( 26 Dec 2022 07:25 )             39.7   12-26    144  |  106  |  20<H>  ----------------------------<  223<H>  3.8   |  32<H>  |  0.66    Ca    9.0      26 Dec 2022 07:25  Phos  2.4     12-26  Mg     2.4     12-26    TPro  7.5  /  Alb  2.8<L>  /  TBili  0.3  /  DBili  x   /  AST  14  /  ALT  22  /  AlkPhos  67  12-26    < from: Xray Chest 1 View-PORTABLE IMMEDIATE (12.23.22 @ 18:52) >    IMPRESSION: There is mild patchy opacification the lungs appears   compatible with mild pulmonary edema. The heart is normal in size. There   are severe degenerative changes of the shoulders bilaterally.    < end of copied text >    < from: 12 Lead ECG (12.23.22 @ 17:45) >      Diagnosis Line Sinus tachycardia  Left axis deviation  Possible Lateral infarct , age undetermined  Abnormal ECG    < end of copied text >        IMP:  COPD exacerbation, improving          CHF seen on CXR, improving          venous stasis changes--this is not cellulitis          wheelchair dependent, likely from deconditioning.  Motor exam in non-focal  Plan:  Discontinue steroids.  Continue inhaler           Echo/ Cardiology, I have asked Dr. Choi to see patient.            PT consultation to consider referral back to [facility]

## 2022-12-26 NOTE — PROGRESS NOTE ADULT - PROBLEM SELECTOR PLAN 8
started Unasyn for possible Cellulitis bilateral lower extremities.  more likely venous stasis  D/C'd Unasyn per attending  blood cultures and urine culture negative  monitor improvement

## 2022-12-26 NOTE — PROGRESS NOTE ADULT - SUBJECTIVE AND OBJECTIVE BOX
NP Note discussed with  Primary Attending    INTERVAL HPI/OVERNIGHT EVENTS: no acute overnight event, on 2L.     MEDICATIONS  (STANDING):  albuterol    90 MICROgram(s) HFA Inhaler 2 Puff(s) Inhalation every 6 hours  amLODIPine   Tablet 10 milliGRAM(s) Oral daily  artificial tears (preservative free) Ophthalmic Solution 1 Drop(s) Both EYES two times a day  aspirin  chewable 81 milliGRAM(s) Oral daily  budesonide 160 MICROgram(s)/formoterol 4.5 MICROgram(s) Inhaler 2 Puff(s) Inhalation two times a day  enoxaparin Injectable 40 milliGRAM(s) SubCutaneous every 24 hours  furosemide    Tablet 40 milliGRAM(s) Oral daily  insulin lispro (ADMELOG) corrective regimen sliding scale   SubCutaneous three times a day before meals  insulin lispro (ADMELOG) corrective regimen sliding scale   SubCutaneous at bedtime  lactulose Syrup 20 Gram(s) Oral two times a day  levothyroxine 200 MICROGram(s) Oral daily  methylPREDNISolone sodium succinate Injectable 40 milliGRAM(s) IV Push every 8 hours  OLANZapine 10 milliGRAM(s) Oral daily  potassium chloride    Tablet ER 20 milliEquivalent(s) Oral daily    MEDICATIONS  (PRN):  acetaminophen     Tablet .. 650 milliGRAM(s) Oral every 6 hours PRN Temp greater or equal to 38C (100.4F), Mild Pain (1 - 3)      __________________________________________________  REVIEW OF SYSTEMS:    CONSTITUTIONAL: No fever,   EYES: no acute visual disturbances  NECK: No pain or stiffness  RESPIRATORY: No cough; No shortness of breath  CARDIOVASCULAR: No chest pain, no palpitations  GASTROINTESTINAL: No pain. No nausea or vomiting; No diarrhea   NEUROLOGICAL: No headache or numbness, no tremors  MUSCULOSKELETAL: No joint pain, no muscle pain  GENITOURINARY: no dysuria, no frequency, no hesitancy  PSYCHIATRY: no depression , no anxiety  ALL OTHER  ROS negative        Vital Signs Last 24 Hrs  T(C): 36.4 (26 Dec 2022 11:49), Max: 37.1 (25 Dec 2022 21:33)  T(F): 97.5 (26 Dec 2022 11:49), Max: 98.8 (25 Dec 2022 21:33)  HR: 103 (26 Dec 2022 11:49) (87 - 110)  BP: 139/73 (26 Dec 2022 11:49) (139/73 - 169/84)  BP(mean): --  RR: 18 (26 Dec 2022 11:49) (18 - 24)  SpO2: 95% (26 Dec 2022 11:49) (94% - 95%)    Parameters below as of 26 Dec 2022 11:49  Patient On (Oxygen Delivery Method): nasal cannula  O2 Flow (L/min): 2      ________________________________________________  PHYSICAL EXAM:  GENERAL: NAD, overweight  HEENT: Normocephalic;  conjunctivae and sclerae clear; moist mucous membranes;   NECK : supple  CHEST/LUNG: Clear to auscultation bilaterally with good air entry   HEART: S1 S2  regular; no murmurs, gallops or rubs  ABDOMEN: Soft, Nontender, Nondistended; Bowel sounds present  EXTREMITIES: no cyanosis; trace lower leg edema ; no calf tenderness  SKIN: warm and dry; erythema lower leg b/l, venous stasis  NERVOUS SYSTEM:  Awake and alert; Oriented  to place, person  ; no new deficits    _________________________________________________  LABS:                        12.4   11.63 )-----------( 229      ( 26 Dec 2022 07:25 )             39.7     12-26    144  |  106  |  20<H>  ----------------------------<  223<H>  3.8   |  32<H>  |  0.66    Ca    9.0      26 Dec 2022 07:25  Phos  2.4     12-26  Mg     2.4     12-26    TPro  7.5  /  Alb  2.8<L>  /  TBili  0.3  /  DBili  x   /  AST  14  /  ALT  22  /  AlkPhos  67  12-26    CAPILLARY BLOOD GLUCOSE    POCT Blood Glucose.: 246 mg/dL (26 Dec 2022 11:23)  POCT Blood Glucose.: 193 mg/dL (26 Dec 2022 07:42)  POCT Blood Glucose.: 319 mg/dL (25 Dec 2022 21:17)  POCT Blood Glucose.: 166 mg/dL (25 Dec 2022 16:39)    RADIOLOGY & ADDITIONAL TESTS:    Imaging  Reviewed:  YES  < from: Xray Chest 1 View-PORTABLE IMMEDIATE (12.23.22 @ 18:52) >  IMPRESSION: There is mild patchy opacification the lungs appears   compatible with mild pulmonary edema. The heart is normal in size. There   are severe degenerative changes of the shoulders bilaterally.  < end of copied text >    < from: CT Head No Cont (12.23.22 @ 21:19) >  IMPRESSION: No intracranial hemorrhage or mass effect. Left   frontotemporal scalp hematoma.  < end of copied text >    Consultant(s) Notes Reviewed:   YES      Plan of care was discussed with patient and /or primary care giver; all questions and concerns were addressed

## 2022-12-26 NOTE — PROGRESS NOTE ADULT - PROBLEM SELECTOR PLAN 11
From [facility]   DVT and GI prophylaxis.  Monitor neurologic status and hematoma.  Continue oxygen support. Currently on 2LPM, Titrated off oxygen as needed.  Downgraded AI to med 12/26  family denied pt back to [facility], CM consulted to facilitate different placement  PT consulted

## 2022-12-26 NOTE — CONSULT NOTE ADULT - SUBJECTIVE AND OBJECTIVE BOX
Time of visit:    CHIEF COMPLAINT: Patient is a 77y old  Female who presents with a chief complaint of Fall (26 Dec 2022 15:32)      HPI:  77 year old F with PMH of HTN, hypothyroidism, CHF, COPD, bipolar d/o, schizoaffective d/o, DM presents after fall. Pt reports she fell from her wheelchair, denies LOC but reports head trauma and left lower leg trauma and is now pain free. Denies pain, fever, chills, shortness of breath, cough, wheezing, chest pain, lightheadedness, dizziness, n/v/d, changes in urinary or bowel habits.    ED Course:  Vitals: /69 P 120 R 18 T 98.2F > 102.2F  SpO2 97% RA  Meds: unasyn, solumedrol 125 mg IV, duonebs x 2 (24 Dec 2022 03:22)   Patient seen and examined.     PAST MEDICAL & SURGICAL HISTORY:  HTN (hypertension)      Schizo affective schizophrenia      Bipolar 1 disorder      Hypothyroidism      Arthritis      Hypercholesteremia      Chronic obstructive pulmonary disease, unspecified COPD type      Chronic diastolic congestive heart failure      Interstitial lung disease      Poor historian      No significant past surgical history          Allergies    Avelox (Unknown)  moxifloxacin (Unknown)    Intolerances        MEDICATIONS  (STANDING):  albuterol    90 MICROgram(s) HFA Inhaler 2 Puff(s) Inhalation every 6 hours  amLODIPine   Tablet 10 milliGRAM(s) Oral daily  artificial tears (preservative free) Ophthalmic Solution 1 Drop(s) Both EYES two times a day  aspirin  chewable 81 milliGRAM(s) Oral daily  budesonide 160 MICROgram(s)/formoterol 4.5 MICROgram(s) Inhaler 2 Puff(s) Inhalation two times a day  enoxaparin Injectable 40 milliGRAM(s) SubCutaneous every 24 hours  furosemide    Tablet 40 milliGRAM(s) Oral daily  insulin lispro (ADMELOG) corrective regimen sliding scale   SubCutaneous three times a day before meals  insulin lispro (ADMELOG) corrective regimen sliding scale   SubCutaneous at bedtime  lactulose Syrup 20 Gram(s) Oral two times a day  levothyroxine 200 MICROGram(s) Oral daily  OLANZapine 10 milliGRAM(s) Oral daily  potassium chloride    Tablet ER 20 milliEquivalent(s) Oral daily      MEDICATIONS  (PRN):  acetaminophen     Tablet .. 650 milliGRAM(s) Oral every 6 hours PRN Temp greater or equal to 38C (100.4F), Mild Pain (1 - 3)   Medications up to date at time of exam.    Medications up to date at time of exam.    FAMILY HISTORY:      SOCIAL HISTORY  Smoking History: [   ] smoking/smoke exposure, [   ] former smoker  Living Condition: [   ] apartment, [   ] private house  Work History:   Travel History: denies recent travel  Illicit Substance Use: denies  Alcohol Use: denies    REVIEW OF SYSTEMS:    CONSTITUTIONAL:  denies fevers, chills, sweats, weight loss    HEENT:  denies diplopia or blurred vision, sore throat or runny nose.    CARDIOVASCULAR:  denies pressure, squeezing, tightness, or heaviness about the chest; no palpitations.    RESPIRATORY:  denies SOB, cough, PEREIRA, wheezing.    GASTROINTESTINAL:  denies abdominal pain, nausea, vomiting or diarrhea.    GENITOURINARY: denies dysuria, frequency or urgency.    NEUROLOGIC:  denies numbness, tingling, seizures or weakness.    PSYCHIATRIC:  denies disorder of thought or mood.    MSK: denies swelling, redness      PHYSICAL EXAMINATION:    GENERAL: The patient is a well-developed, well-nourished, in no apparent distress.     Vital Signs Last 24 Hrs  T(C): 36.4 (26 Dec 2022 11:49), Max: 37.1 (25 Dec 2022 21:33)  T(F): 97.5 (26 Dec 2022 11:49), Max: 98.8 (25 Dec 2022 21:33)  HR: 103 (26 Dec 2022 11:49) (87 - 110)  BP: 139/73 (26 Dec 2022 11:49) (139/73 - 169/84)  BP(mean): --  RR: 18 (26 Dec 2022 11:49) (18 - 24)  SpO2: 95% (26 Dec 2022 11:49) (94% - 95%)    Parameters below as of 26 Dec 2022 11:49  Patient On (Oxygen Delivery Method): nasal cannula  O2 Flow (L/min): 2     (if applicable)    Chest Tube (if applicable)    HEENT: Head is normocephalic and atraumatic. Extraocular muscles are intact. Mucous membranes are moist.     NECK: Supple, no palpable adenopathy.    LUNGS: Clear to auscultation, no wheezing, rales, or rhonchi.    HEART: Regular rate and rhythm without murmur.    ABDOMEN: Soft, nontender, and nondistended.  No hepatosplenomegaly is noted.    RENAL: No difficulty voiding, no pelvic pain    EXTREMITIES: Without any cyanosis, clubbing, rash, lesions or edema.    NEUROLOGIC: Awake, alert, oriented, grossly intact    SKIN: Warm, dry, good turgor.      LABS:                        12.4   11.63 )-----------( 229      ( 26 Dec 2022 07:25 )             39.7     12-26    144  |  106  |  20<H>  ----------------------------<  223<H>  3.8   |  32<H>  |  0.66    Ca    9.0      26 Dec 2022 07:25  Phos  2.4     12-26  Mg     2.4     12-26    TPro  7.5  /  Alb  2.8<L>  /  TBili  0.3  /  DBili  x   /  AST  14  /  ALT  22  /  AlkPhos  67  12-26        ABG - ( 25 Dec 2022 05:29 )  pH, Arterial: 7.40  pH, Blood: x     /  pCO2: 50    /  pO2: 103   / HCO3: 31    / Base Excess: 5.0   /  SaO2: 99                              MICROBIOLOGY: (if applicable)    RADIOLOGY & ADDITIONAL STUDIES:  EKG:   CXR:  ECHO:    IMPRESSION: 77y Female PAST MEDICAL & SURGICAL HISTORY:  HTN (hypertension)      Schizo affective schizophrenia      Bipolar 1 disorder      Hypothyroidism      Arthritis      Hypercholesteremia      Chronic obstructive pulmonary disease, unspecified COPD type      Chronic diastolic congestive heart failure      Interstitial lung disease      Poor historian      No significant past surgical history       p/w                   RECOMMENDATIONS:   Time of visit:    CHIEF COMPLAINT: Patient is a 77y old  Female who presents with a chief complaint of Fall (26 Dec 2022 15:32)      HPI:  77 year old F with PMH of HTN, hypothyroidism, CHF, COPD, bipolar d/o, schizoaffective d/o, DM presents after fall. Pt reports she fell from her wheelchair, denies LOC but reports head trauma and left lower leg trauma and is now pain free. Denies pain, fever, chills, shortness of breath, cough, wheezing, chest pain, lightheadedness, dizziness, n/v/d, changes in urinary or bowel habits.    ED Course:  Vitals: /69 P 120 R 18 T 98.2F > 102.2F  SpO2 97% RA  Meds: unasyn, solumedrol 125 mg IV, duonebs x 2 (24 Dec 2022 03:22)   Patient seen and examined.     PAST MEDICAL & SURGICAL HISTORY:  HTN (hypertension)      Schizo affective schizophrenia      Bipolar 1 disorder      Hypothyroidism      Arthritis      Hypercholesteremia      Chronic obstructive pulmonary disease, unspecified COPD type      Chronic diastolic congestive heart failure      Interstitial lung disease      Poor historian      No significant past surgical history          Allergies    Avelox (Unknown)  moxifloxacin (Unknown)    Intolerances        MEDICATIONS  (STANDING):  albuterol    90 MICROgram(s) HFA Inhaler 2 Puff(s) Inhalation every 6 hours  amLODIPine   Tablet 10 milliGRAM(s) Oral daily  artificial tears (preservative free) Ophthalmic Solution 1 Drop(s) Both EYES two times a day  aspirin  chewable 81 milliGRAM(s) Oral daily  budesonide 160 MICROgram(s)/formoterol 4.5 MICROgram(s) Inhaler 2 Puff(s) Inhalation two times a day  enoxaparin Injectable 40 milliGRAM(s) SubCutaneous every 24 hours  furosemide    Tablet 40 milliGRAM(s) Oral daily  insulin lispro (ADMELOG) corrective regimen sliding scale   SubCutaneous three times a day before meals  insulin lispro (ADMELOG) corrective regimen sliding scale   SubCutaneous at bedtime  lactulose Syrup 20 Gram(s) Oral two times a day  levothyroxine 200 MICROGram(s) Oral daily  OLANZapine 10 milliGRAM(s) Oral daily  potassium chloride    Tablet ER 20 milliEquivalent(s) Oral daily      MEDICATIONS  (PRN):  acetaminophen     Tablet .. 650 milliGRAM(s) Oral every 6 hours PRN Temp greater or equal to 38C (100.4F), Mild Pain (1 - 3)   Medications up to date at time of exam.    Medications up to date at time of exam.    FAMILY HISTORY:      SOCIAL HISTORY  Smoking History: [   ] smoking/smoke exposure, [   ] former smoker  Living Condition: [   ] apartment, [   ] private house  Work History:   Travel History: denies recent travel  Illicit Substance Use: denies  Alcohol Use: denies    REVIEW OF SYSTEMS:    CONSTITUTIONAL:  denies fevers, chills, sweats, weight loss    HEENT:  denies diplopia or blurred vision, sore throat or runny nose.    CARDIOVASCULAR:  denies pressure, squeezing, tightness, or heaviness about the chest; no palpitations.    RESPIRATORY:  denies SOB, cough, PEREIRA, wheezing.    GASTROINTESTINAL:  denies abdominal pain, nausea, vomiting or diarrhea.    GENITOURINARY: denies dysuria, frequency or urgency.    NEUROLOGIC:  denies numbness, tingling, seizures or weakness.    PSYCHIATRIC:  denies disorder of thought or mood.    MSK: denies swelling, redness      PHYSICAL EXAMINATION:    GENERAL: The patient is a well-developed, well-nourished, in no apparent distress.     Vital Signs Last 24 Hrs  T(C): 36.4 (26 Dec 2022 11:49), Max: 37.1 (25 Dec 2022 21:33)  T(F): 97.5 (26 Dec 2022 11:49), Max: 98.8 (25 Dec 2022 21:33)  HR: 103 (26 Dec 2022 11:49) (87 - 110)  BP: 139/73 (26 Dec 2022 11:49) (139/73 - 169/84)  BP(mean): --  RR: 18 (26 Dec 2022 11:49) (18 - 24)  SpO2: 95% (26 Dec 2022 11:49) (94% - 95%)    Parameters below as of 26 Dec 2022 11:49  Patient On (Oxygen Delivery Method): nasal cannula  O2 Flow (L/min): 2     (if applicable)    Chest Tube (if applicable)    HEENT: Head is normocephalic and atraumatic. Extraocular muscles are intact. Mucous membranes are moist.     NECK: Supple, no palpable adenopathy.    LUNGS: Clear to auscultation, no wheezing, rales, or rhonchi.    HEART: Regular rate and rhythm without murmur.    ABDOMEN: Soft, nontender, and nondistended.  No hepatosplenomegaly is noted.    RENAL: No difficulty voiding, no pelvic pain    EXTREMITIES: Without any cyanosis, clubbing, rash, lesions or edema.    NEUROLOGIC: Awake, alert, oriented, grossly intact    SKIN: Warm, dry, good turgor.      LABS:                        12.4   11.63 )-----------( 229      ( 26 Dec 2022 07:25 )             39.7     12-26    144  |  106  |  20<H>  ----------------------------<  223<H>  3.8   |  32<H>  |  0.66    Ca    9.0      26 Dec 2022 07:25  Phos  2.4     12-26  Mg     2.4     12-26    TPro  7.5  /  Alb  2.8<L>  /  TBili  0.3  /  DBili  x   /  AST  14  /  ALT  22  /  AlkPhos  67  12-26        ABG - ( 25 Dec 2022 05:29 )  pH, Arterial: 7.40  pH, Blood: x     /  pCO2: 50    /  pO2: 103   / HCO3: 31    / Base Excess: 5.0   /  SaO2: 99                              MICROBIOLOGY: (if applicable)    RADIOLOGY & ADDITIONAL STUDIES:  EKG:   CXR:  ECHO:    IMPRESSION: 77y Female PAST MEDICAL & SURGICAL HISTORY:  HTN (hypertension)      Schizo affective schizophrenia      Bipolar 1 disorder      Hypothyroidism      Arthritis      Hypercholesteremia      Chronic obstructive pulmonary disease, unspecified COPD type      Chronic diastolic congestive heart failure      Interstitial lung disease      Poor historian      No significant past surgical history       p/w             77 year old F with PMH of HTN, hypothyroidism, CHF, COPD, bipolar d/o, schizoaffective d/o, DM presents after fall. Pt reports she fell from her wheelchair, denies LOC but reports head trauma and left lower leg trauma and is now pain free. Denies pain, fever, chills, shortness of breath, cough, wheezing, chest pain, lightheadedness, dizziness, n/v/d, changes in urinary or bowel habits.    ED Course:  Vitals: /69 P 120 R 18 T 98.2F > 102.2F  SpO2 97% RA  Meds: Unasyn, solumedrol 125 mg IV, Duonebs x 2    Patient is admitted to  for  COPD exacerbation and s/p fall at nursing home. CXR shows mild patchy opacification the lungs appears compatible with mild pulmonary edema.  CTH shows +Hematome forehead. No bleeding or midline shift noted.    Pt is unable to titrate oxygen off this time-86% on room air. Placed on 2L NC.   continues Unasyn for lower leg cellulitis.      COVID-19 Negative Lab Result:  · COVID-19 Negative Lab Result	COVID-19 ruled out     Problem/Plan - 1:  ·  Problem: Sepsis, unspecified organism.   ·  Plan: Admitted with fever 102.2  Given vanco and Unasyn in ED.  CXR shows There is mild patchy opacification the lungs appears compatible with mild pulmonary edema.     Continue Unasyn.  Serum lactate normal.  F/U blood cultures and urine culture.   +Cellulitis bilateral lower extremities.     Problem/Plan - 2:  ·  Problem: Fall.   ·  Plan: S/P mechanical fall at NH  +trauma/hematoma to forehead. Denying any pain.  CT +Hematome forehead. No bleeding or midline shift noted on CT scan.  Monitor size of hematoma.  Tylenol only for pain.  Maintain fall and safety measures.  Monitor neuro status.     Problem/Plan - 3:  ·  Problem: Cellulitis of both lower extremities.   ·  Plan: +Cellulitis bilateral lower extremities.  Continue Unasyn.  F/U blood cultures and urine culture.    monitor improvement.     Problem/Plan - 4:  ·  Problem: COPD exacerbation.   ·  Plan: Known COPD  Uses Breo and albuterol at facility.  Does not use oxygen or BIPAP  Presented with significant wheezing in ED  CXR shows mild patchy opacification the lungs appears compatible with mild pulmonary edema.     Continue bronchodilators and ICS  Continue oxygen support. Currently tolerating 2LPM. Monitor oxygen saturation  Continue solumedrol 40mg every 8 hours.  Titrate Oxygen as needed.     Problem/Plan - 5:  ·  Problem: Chronic CHF.   ·  Plan: Not acute exacerbation  Continue amlodipine and lasix.  c/w Statin, ASA,     Problem/Plan - 6:  ·  Problem: Hypokalemia.   ·  Plan: K 3.2  replaced po supplement, give x 3days  pt takes Lasix. will adjust dose if no improvement.   Monitor serum potassium.

## 2022-12-26 NOTE — PROGRESS NOTE ADULT - ASSESSMENT
77 year old F with PMH of HTN, hypothyroidism, CHF, COPD, bipolar d/o, schizoaffective d/o, DM presents after fall. Pt reports she fell from her wheelchair, denies LOC but reports head trauma and left lower leg trauma and is now pain free. Denies pain, fever, chills, shortness of breath, cough, wheezing, chest pain, lightheadedness, dizziness, n/v/d, changes in urinary or bowel habits.    ED Course:  Vitals: /69 P 120 R 18 T 98.2F > 102.2F  SpO2 97% RA  Meds: Unasyn, solumedrol 125 mg IV, Duonebs x 2    Patient is admitted to  for  COPD exacerbation and s/p fall at nursing home. CXR shows mild patchy opacification the lungs appears compatible with mild pulmonary edema.  CTH shows +Hematome forehead. No bleeding or midline shift noted.  Pt is unable to titrate oxygen off this time-86% on room air. Placed on 2L NC.     12/26 Medically stable, downgraded to medicine under dr. Leavitt service (covering  MD dr. Cordova today)  PT consulted, IV solumedrol changed to po. D/C'd Unasyn.   Family refuses pt back to [facility],  to follow up to facilitate different placement.

## 2022-12-27 LAB
ALBUMIN SERPL ELPH-MCNC: 2.7 G/DL — LOW (ref 3.5–5)
ALP SERPL-CCNC: 64 U/L — SIGNIFICANT CHANGE UP (ref 40–120)
ALT FLD-CCNC: 37 U/L DA — SIGNIFICANT CHANGE UP (ref 10–60)
ANION GAP SERPL CALC-SCNC: 6 MMOL/L — SIGNIFICANT CHANGE UP (ref 5–17)
AST SERPL-CCNC: 22 U/L — SIGNIFICANT CHANGE UP (ref 10–40)
BILIRUB SERPL-MCNC: 0.4 MG/DL — SIGNIFICANT CHANGE UP (ref 0.2–1.2)
BUN SERPL-MCNC: 23 MG/DL — HIGH (ref 7–18)
CALCIUM SERPL-MCNC: 8.8 MG/DL — SIGNIFICANT CHANGE UP (ref 8.4–10.5)
CHLORIDE SERPL-SCNC: 106 MMOL/L — SIGNIFICANT CHANGE UP (ref 96–108)
CO2 SERPL-SCNC: 35 MMOL/L — HIGH (ref 22–31)
CREAT SERPL-MCNC: 0.63 MG/DL — SIGNIFICANT CHANGE UP (ref 0.5–1.3)
EGFR: 92 ML/MIN/1.73M2 — SIGNIFICANT CHANGE UP
GLUCOSE BLDC GLUCOMTR-MCNC: 129 MG/DL — HIGH (ref 70–99)
GLUCOSE BLDC GLUCOMTR-MCNC: 155 MG/DL — HIGH (ref 70–99)
GLUCOSE BLDC GLUCOMTR-MCNC: 163 MG/DL — HIGH (ref 70–99)
GLUCOSE BLDC GLUCOMTR-MCNC: 187 MG/DL — HIGH (ref 70–99)
GLUCOSE SERPL-MCNC: 171 MG/DL — HIGH (ref 70–99)
HCT VFR BLD CALC: 41.6 % — SIGNIFICANT CHANGE UP (ref 34.5–45)
HGB BLD-MCNC: 12.6 G/DL — SIGNIFICANT CHANGE UP (ref 11.5–15.5)
MAGNESIUM SERPL-MCNC: 2.4 MG/DL — SIGNIFICANT CHANGE UP (ref 1.6–2.6)
MCHC RBC-ENTMCNC: 25.6 PG — LOW (ref 27–34)
MCHC RBC-ENTMCNC: 30.3 GM/DL — LOW (ref 32–36)
MCV RBC AUTO: 84.4 FL — SIGNIFICANT CHANGE UP (ref 80–100)
NRBC # BLD: 0 /100 WBCS — SIGNIFICANT CHANGE UP (ref 0–0)
PHOSPHATE SERPL-MCNC: 2.8 MG/DL — SIGNIFICANT CHANGE UP (ref 2.5–4.5)
PLATELET # BLD AUTO: 222 K/UL — SIGNIFICANT CHANGE UP (ref 150–400)
POTASSIUM SERPL-MCNC: 3.9 MMOL/L — SIGNIFICANT CHANGE UP (ref 3.5–5.3)
POTASSIUM SERPL-SCNC: 3.9 MMOL/L — SIGNIFICANT CHANGE UP (ref 3.5–5.3)
PROT SERPL-MCNC: 7.1 G/DL — SIGNIFICANT CHANGE UP (ref 6–8.3)
RBC # BLD: 4.93 M/UL — SIGNIFICANT CHANGE UP (ref 3.8–5.2)
RBC # FLD: 18 % — HIGH (ref 10.3–14.5)
SODIUM SERPL-SCNC: 147 MMOL/L — HIGH (ref 135–145)
WBC # BLD: 7.94 K/UL — SIGNIFICANT CHANGE UP (ref 3.8–10.5)
WBC # FLD AUTO: 7.94 K/UL — SIGNIFICANT CHANGE UP (ref 3.8–10.5)

## 2022-12-27 RX ORDER — TRAMADOL HYDROCHLORIDE 50 MG/1
25 TABLET ORAL EVERY 6 HOURS
Refills: 0 | Status: DISCONTINUED | OUTPATIENT
Start: 2022-12-27 | End: 2022-12-27

## 2022-12-27 RX ORDER — TRAMADOL HYDROCHLORIDE 50 MG/1
50 TABLET ORAL EVERY 6 HOURS
Refills: 0 | Status: DISCONTINUED | OUTPATIENT
Start: 2022-12-27 | End: 2023-01-03

## 2022-12-27 RX ADMIN — Medication 40 MILLIGRAM(S): at 06:20

## 2022-12-27 RX ADMIN — ALBUTEROL 2 PUFF(S): 90 AEROSOL, METERED ORAL at 22:20

## 2022-12-27 RX ADMIN — LACTULOSE 20 GRAM(S): 10 SOLUTION ORAL at 06:19

## 2022-12-27 RX ADMIN — AMLODIPINE BESYLATE 10 MILLIGRAM(S): 2.5 TABLET ORAL at 06:21

## 2022-12-27 RX ADMIN — Medication 81 MILLIGRAM(S): at 11:39

## 2022-12-27 RX ADMIN — Medication 200 MICROGRAM(S): at 06:22

## 2022-12-27 RX ADMIN — Medication 1 DROP(S): at 06:42

## 2022-12-27 RX ADMIN — ENOXAPARIN SODIUM 40 MILLIGRAM(S): 100 INJECTION SUBCUTANEOUS at 06:20

## 2022-12-27 RX ADMIN — ALBUTEROL 2 PUFF(S): 90 AEROSOL, METERED ORAL at 08:43

## 2022-12-27 RX ADMIN — TRAMADOL HYDROCHLORIDE 50 MILLIGRAM(S): 50 TABLET ORAL at 22:46

## 2022-12-27 RX ADMIN — Medication 20 MILLIEQUIVALENT(S): at 11:50

## 2022-12-27 RX ADMIN — Medication 1: at 16:59

## 2022-12-27 RX ADMIN — BUDESONIDE AND FORMOTEROL FUMARATE DIHYDRATE 2 PUFF(S): 160; 4.5 AEROSOL RESPIRATORY (INHALATION) at 22:20

## 2022-12-27 RX ADMIN — TRAMADOL HYDROCHLORIDE 50 MILLIGRAM(S): 50 TABLET ORAL at 23:46

## 2022-12-27 RX ADMIN — OLANZAPINE 10 MILLIGRAM(S): 15 TABLET, FILM COATED ORAL at 11:39

## 2022-12-27 RX ADMIN — Medication 1 DROP(S): at 17:04

## 2022-12-27 RX ADMIN — ALBUTEROL 2 PUFF(S): 90 AEROSOL, METERED ORAL at 15:43

## 2022-12-27 RX ADMIN — Medication 20 MILLIGRAM(S): at 06:21

## 2022-12-27 RX ADMIN — Medication 1: at 11:41

## 2022-12-27 RX ADMIN — ALBUTEROL 2 PUFF(S): 90 AEROSOL, METERED ORAL at 03:25

## 2022-12-27 RX ADMIN — BUDESONIDE AND FORMOTEROL FUMARATE DIHYDRATE 2 PUFF(S): 160; 4.5 AEROSOL RESPIRATORY (INHALATION) at 09:35

## 2022-12-27 NOTE — PROGRESS NOTE ADULT - SUBJECTIVE AND OBJECTIVE BOX
NP Note discussed with  Primary Attending    Patient is a 77y old  Female who presents with a chief complaint of Fall (27 Dec 2022 08:00)      INTERVAL HPI/OVERNIGHT EVENTS: no new complaints    MEDICATIONS  (STANDING):  albuterol    90 MICROgram(s) HFA Inhaler 2 Puff(s) Inhalation every 6 hours  amLODIPine   Tablet 10 milliGRAM(s) Oral daily  artificial tears (preservative free) Ophthalmic Solution 1 Drop(s) Both EYES two times a day  aspirin  chewable 81 milliGRAM(s) Oral daily  budesonide 160 MICROgram(s)/formoterol 4.5 MICROgram(s) Inhaler 2 Puff(s) Inhalation two times a day  enoxaparin Injectable 40 milliGRAM(s) SubCutaneous every 24 hours  furosemide    Tablet 40 milliGRAM(s) Oral daily  insulin lispro (ADMELOG) corrective regimen sliding scale   SubCutaneous three times a day before meals  insulin lispro (ADMELOG) corrective regimen sliding scale   SubCutaneous at bedtime  lactulose Syrup 20 Gram(s) Oral two times a day  levothyroxine 200 MICROGram(s) Oral daily  OLANZapine 10 milliGRAM(s) Oral daily  predniSONE   Tablet 20 milliGRAM(s) Oral daily    MEDICATIONS  (PRN):  acetaminophen     Tablet .. 650 milliGRAM(s) Oral every 6 hours PRN Temp greater or equal to 38C (100.4F), Mild Pain (1 - 3)      __________________________________________________  REVIEW OF SYSTEMS:    CONSTITUTIONAL: No fever,   EYES: no acute visual disturbances  NECK: No pain or stiffness  RESPIRATORY: No cough; No shortness of breath  CARDIOVASCULAR: No chest pain, no palpitations  GASTROINTESTINAL: No pain. No nausea or vomiting; No diarrhea   NEUROLOGICAL: No headache or numbness, no tremors  MUSCULOSKELETAL: No joint pain, no muscle pain  GENITOURINARY: no dysuria, no frequency, no hesitancy  PSYCHIATRY: no depression , no anxiety  ALL OTHER  ROS negative        Vital Signs Last 24 Hrs  T(C): 36.4 (27 Dec 2022 12:00), Max: 36.9 (26 Dec 2022 22:12)  T(F): 97.5 (27 Dec 2022 12:00), Max: 98.4 (26 Dec 2022 22:12)  HR: 87 (27 Dec 2022 13:53) (79 - 100)  BP: 142/68 (27 Dec 2022 13:53) (103/65 - 164/79)  BP(mean): --  RR: 16 (27 Dec 2022 12:00) (16 - 19)  SpO2: 94% (27 Dec 2022 12:00) (94% - 96%)    Parameters below as of 27 Dec 2022 12:00  Patient On (Oxygen Delivery Method): nasal cannula  O2 Flow (L/min): 2      ________________________________________________  PHYSICAL EXAM:  GENERAL: NAD  HEENT: Normocephalic;  conjunctivae and sclerae clear; moist mucous membranes;   NECK : supple  CHEST/LUNG: Clear to auscultation bilaterally with good air entry   HEART: S1 S2  regular; no murmurs, gallops or rubs  ABDOMEN: Soft, Nontender, Nondistended; Bowel sounds present  EXTREMITIES: no cyanosis; no edema; no calf tenderness  SKIN: warm and dry; no rash  NERVOUS SYSTEM:  Awake and alert; Oriented  to place, person and time ; no new deficits    _________________________________________________  LABS:                        12.6   7.94  )-----------( 222      ( 27 Dec 2022 08:25 )             41.6     12-27    147<H>  |  106  |  23<H>  ----------------------------<  171<H>  3.9   |  35<H>  |  0.63    Ca    8.8      27 Dec 2022 08:25  Phos  2.8     12-27  Mg     2.4     12-27    TPro  7.1  /  Alb  2.7<L>  /  TBili  0.4  /  DBili  x   /  AST  22  /  ALT  37  /  AlkPhos  64  12-27        CAPILLARY BLOOD GLUCOSE      POCT Blood Glucose.: 187 mg/dL (27 Dec 2022 11:29)  POCT Blood Glucose.: 129 mg/dL (27 Dec 2022 07:32)  POCT Blood Glucose.: 177 mg/dL (26 Dec 2022 21:40)  POCT Blood Glucose.: 179 mg/dL (26 Dec 2022 16:43)        RADIOLOGY & ADDITIONAL TESTS:    Imaging  Reviewed:  YES/NO    Consultant(s) Notes Reviewed:   YES/ No      Plan of care was discussed with patient and /or primary care giver; all questions and concerns were addressed  NP Note:    Patient is a 77y old Female who presents with a chief complaint of Fall (27 Dec 2022 09:00)      INTERVAL HPI/OVERNIGHT EVENTS: no new complaints, resting comfortably in bed. Alert and oriented    MEDICATIONS  (STANDING):  albuterol    90 MICROgram(s) HFA Inhaler 2 Puff(s) Inhalation every 6 hours  amLODIPine   Tablet 10 milliGRAM(s) Oral daily  artificial tears (preservative free) Ophthalmic Solution 1 Drop(s) Both EYES two times a day  aspirin  chewable 81 milliGRAM(s) Oral daily  budesonide 160 MICROgram(s)/formoterol 4.5 MICROgram(s) Inhaler 2 Puff(s) Inhalation two times a day  enoxaparin Injectable 40 milliGRAM(s) SubCutaneous every 24 hours  furosemide    Tablet 40 milliGRAM(s) Oral daily  insulin lispro (ADMELOG) corrective regimen sliding scale   SubCutaneous three times a day before meals  insulin lispro (ADMELOG) corrective regimen sliding scale   SubCutaneous at bedtime  lactulose Syrup 20 Gram(s) Oral two times a day  levothyroxine 200 MICROGram(s) Oral daily  OLANZapine 10 milliGRAM(s) Oral daily  predniSONE   Tablet 20 milliGRAM(s) Oral daily    MEDICATIONS  (PRN):  acetaminophen     Tablet .. 650 milliGRAM(s) Oral every 6 hours PRN Temp greater or equal to 38C (100.4F), Mild Pain (1 - 3)      __________________________________________________  REVIEW OF SYSTEMS:    CONSTITUTIONAL: No fever,   EYES: no acute visual disturbances  NECK: No pain or stiffness  RESPIRATORY: No cough; No shortness of breath  CARDIOVASCULAR: No chest pain, no palpitations  GASTROINTESTINAL: No pain. No nausea or vomiting; No diarrhea   NEUROLOGICAL: No headache or numbness, no tremors  MUSCULOSKELETAL: No joint pain, no muscle pain  GENITOURINARY: no dysuria, no frequency, no hesitancy  PSYCHIATRY: no depression , no anxiety  ALL OTHER  ROS negative        Vital Signs Last 24 Hrs  T(C): 36.4 (27 Dec 2022 12:00), Max: 36.9 (26 Dec 2022 22:12)  T(F): 97.5 (27 Dec 2022 12:00), Max: 98.4 (26 Dec 2022 22:12)  HR: 87 (27 Dec 2022 13:53) (79 - 100)  BP: 142/68 (27 Dec 2022 13:53) (103/65 - 164/79)  BP(mean): --  RR: 16 (27 Dec 2022 12:00) (16 - 19)  SpO2: 94% (27 Dec 2022 12:00) (94% - 96%)    Parameters below as of 27 Dec 2022 12:00  Patient On (Oxygen Delivery Method): nasal cannula  O2 Flow (L/min): 2      ________________________________________________  PHYSICAL EXAM:  GENERAL: NAD  HEENT: Normocephalic;  left periorbital ecchymosis, conjunctivae and sclerae clear; moist mucous membranes;   NECK : supple  CHEST/LUNG: B/l lungs w/ rales at the bases, breathing comfortably on RA w/o increased WOB   HEART: S1 S2  regular; no murmurs, gallops or rubs  ABDOMEN: Soft, Nontender, Nondistended; Bowel sounds present  EXTREMITIES: b/l lower extremities with chronic hyperpigmented skin changes from venous stasis not erythematous or tender on exam no cyanosis; no edema; no calf tenderness  SKIN: warm and dry; no rash  NERVOUS SYSTEM:  Awake and alert; Oriented  to place, person and time ; no new deficits    _________________________________________________  LABS:                        12.6   7.94  )-----------( 222      ( 27 Dec 2022 08:25 )             41.6     12-27    147<H>  |  106  |  23<H>  ----------------------------<  171<H>  3.9   |  35<H>  |  0.63    Ca    8.8      27 Dec 2022 08:25  Phos  2.8     12-27  Mg     2.4     12-27    TPro  7.1  /  Alb  2.7<L>  /  TBili  0.4  /  DBili  x   /  AST  22  /  ALT  37  /  AlkPhos  64  12-27        CAPILLARY BLOOD GLUCOSE      POCT Blood Glucose.: 187 mg/dL (27 Dec 2022 11:29)  POCT Blood Glucose.: 129 mg/dL (27 Dec 2022 07:32)  POCT Blood Glucose.: 177 mg/dL (26 Dec 2022 21:40)  POCT Blood Glucose.: 179 mg/dL (26 Dec 2022 16:43)        RADIOLOGY & ADDITIONAL TESTS:    Imaging  Reviewed:  YES/NO    Consultant(s) Notes Reviewed:   YES/ No      Plan of care was discussed with patient and /or primary care giver; all questions and concerns were addressed

## 2022-12-27 NOTE — PHYSICAL THERAPY INITIAL EVALUATION ADULT - GENERAL OBSERVATIONS, REHAB EVAL
female copd patient, from rehab, refer for PT assessment in medical floor, nc 02, generalized weakness requiring assistance to sit in bed

## 2022-12-27 NOTE — PROGRESS NOTE ADULT - PROBLEM SELECTOR PLAN 11
From [facility]   DVT and GI prophylaxis.  Monitor neurologic status and hematoma.  Continue oxygen support. Currently on 2LPM, Titrated off oxygen as needed.  Downgraded AI to med 12/26  family denied pt back to [facility], CM consulted to facilitate different placement  PT consulted From [facility]   DVT and GI prophylaxis.  Monitor neurologic status and hematoma.  Continue oxygen support. Currently on 2LPM, Titrated off oxygen as needed.  Downgraded AI to med 12/26  family denied pt back to [facility], CM consulted to facilitate different placement  PT recs [facility]

## 2022-12-27 NOTE — PROGRESS NOTE ADULT - PROBLEM SELECTOR PLAN 2
S/P mechanical fall at NH  +trauma/hematoma to forehead. Denying any pain.  CT +Hematome forehead. No bleeding or midline shift noted on CT scan.  Monitor size of hematoma.  Tylenol only for pain.  Maintain fall and safety measures.  Monitor neuro status.  PT consulted S/P mechanical fall at NH  +trauma/hematoma to forehead. Denying any pain.  CT +Hematome forehead. No bleeding or midline shift noted on CT scan.  Monitor size of hematoma.  Tylenol only for pain.  Maintain fall and safety measures.  Monitor neuro status.  PT recs [facility]

## 2022-12-27 NOTE — CONSULT NOTE ADULT - SUBJECTIVE AND OBJECTIVE BOX
DATE OF SERVICE:12-27-22 @ 08:01    Patient was seen,examined and evaluated  by me.ER evaluation, Labs and Hospital course was reviewed,    CHIEF COMPLAINT:Fall    HPI:77 year old F with PMH of HTN, hypothyroidism, HF, COPD, bipolar d/o, schizoaffective d/o, DM presents after a fall.   Pt reports she fell from her wheelchair, denies LOC but reports head trauma and left lower leg trauma and is now pain free. Denies pain, fever, chills, shortness of breath, cough, wheezing, chest pain, lightheadedness, dizziness, n/v/d, changes in urinary or bowel habits.    ED Course:  Vitals: /69 P 120 R 18 T 98.2F > 102.2F  SpO2 97% RA  Meds: unasyn, solumedrol 125 mg IV, duonebs x 2     Patient was  admitted to  for  COPD exacerbation and s/p fall at nursing home.   CXR shows mild patchy opacification the lungs appears compatible with mild pulmonary edema.    CTH shows +Hematome forehead. No bleeding or midline shift noted.    Pt is unable to titrate oxygen off this time-86% on room air. Placed on 2L NC.     12/26 Medically stable, downgraded to medicine   PT consulted, IV solumedrol changed to po. D/C'd Unasyn.   Cardiology evaluation for HF    PAST MEDICAL & SURGICAL HISTORY:  HTN (hypertension)  Schizo affective schizophrenia  Bipolar 1 disorder  Hypothyroidism  Arthritis  Hypercholesteremia  Chronic obstructive pulmonary disease, unspecified COPD type  Chronic diastolic congestive heart failure  Interstitial lung disease  Poor historian  No significant past surgical history          MEDICATIONS  (STANDING):  albuterol    90 MICROgram(s) HFA Inhaler 2 Puff(s) Inhalation every 6 hours  amLODIPine   Tablet 10 milliGRAM(s) Oral daily  artificial tears (preservative free) Ophthalmic Solution 1 Drop(s) Both EYES two times a day  aspirin  chewable 81 milliGRAM(s) Oral daily  budesonide 160 MICROgram(s)/formoterol 4.5 MICROgram(s) Inhaler 2 Puff(s) Inhalation two times a day  enoxaparin Injectable 40 milliGRAM(s) SubCutaneous every 24 hours  furosemide    Tablet 40 milliGRAM(s) Oral daily  insulin lispro (ADMELOG) corrective regimen sliding scale   SubCutaneous three times a day before meals  insulin lispro (ADMELOG) corrective regimen sliding scale   SubCutaneous at bedtime  lactulose Syrup 20 Gram(s) Oral two times a day  levothyroxine 200 MICROGram(s) Oral daily  OLANZapine 10 milliGRAM(s) Oral daily  potassium chloride    Tablet ER 20 milliEquivalent(s) Oral daily  predniSONE   Tablet 20 milliGRAM(s) Oral daily    MEDICATIONS  (PRN):  acetaminophen     Tablet .. 650 milliGRAM(s) Oral every 6 hours PRN Temp greater or equal to 38C (100.4F), Mild Pain (1 - 3)      FAMILY HISTORY:    No family history of premature coronary artery disease or sudden cardiac death    SOCIAL HISTORY:  Smoking-[ ] Active  [ ] Former [x ] Non Smoker  Alcohol-[x ] Denies [ ] Social [ ] Daily  Ilicit Drug use-[x ] Denies [ ] Active user    REVIEW OF SYSTEMS:  Constitutional: [ ] fever, [ ]weight loss, [x ]fatigue   Activity [ ] Bedbound,[ ] Ambulates [ ] Unassisted[ ] Cane/Walker [x ] Assistence.  Effort tolerance:[ ] Excellent [ ] Good [ ] Fair [ ] Poor [x ]  Eyes: [ ] visual changes  Respiratory: [x ]shortness of breath;  [ ] cough, [ ]wheezing, [ ]chills, [ ]hemoptysis  Cardiovascular: [ ] chest pain, [ ]palpitations, [ ]dizziness,  [ ]leg swelling[ ]orthopnea [ ]PND  Gastrointestinal: [ ] abdominal pain, [ ]nausea, [ ]vomiting,  [ ]diarrhea,[ ]constipation  Genitourinary: [ ] dysuria, [ ] hematuria  Neurologic: [ ] headaches [ ] tremors[ ] weakness  Skin: [ ] itching, [ ]burning, [ ] rashes  Endocrine: [ ] heat or cold intolerance  Musculoskeletal: [ ] joint pain or swelling; [ ] muscle, back, or extremity pain  Psychiatric: [ ] depression, [ ]anxiety, [ ]mood swings, or [ ]difficulty sleeping  Hematologic: [ ] easy bruising, [ ] bleeding gums       [ x] All others negative	  [ ] Unable to obtain    Vital Signs Last 24 Hrs  T(C): 36.3 (27 Dec 2022 05:00), Max: 36.9 (26 Dec 2022 22:12)  T(F): 97.4 (27 Dec 2022 05:00), Max: 98.4 (26 Dec 2022 22:12)  HR: 79 (27 Dec 2022 05:00) (79 - 110)  BP: 135/72 (27 Dec 2022 05:00) (135/72 - 164/79)  RR: 19 (27 Dec 2022 05:00) (18 - 20)  SpO2: 95% (27 Dec 2022 05:00) (94% - 96%)    Parameters below as of 27 Dec 2022 05:00  Patient On (Oxygen Delivery Method): nasal cannula  O2 Flow (L/min): 2    I&O's Summary    26 Dec 2022 07:01  -  27 Dec 2022 07:00  --------------------------------------------------------  IN: 0 mL / OUT: 700 mL / NET: -700 mL        PHYSICAL EXAM:  General: No acute distress BMI-37.7  HEENT: EOMI, PERRL[ ] Icteric  Neck: Supple, No JVD  Lungs: Fair air entry bilaterally; [ ] Rales [ ] Rhonchi [ ] Wheezing  Heart: Regular rate and rhythm;[x ] Murmurs-   2/6 [x ] Systolic [ ] Diastolic [ ] Radiation,No rubs, or gallops  Abdomen: Nontender, bowel sounds present  Extremities: No clubbing, cyanosis, or edema[ ] Calf tenderness  Nervous system:  Alert & Oriented X3, no focal deficits  Psychiatric: Normal affect  Skin: No rashes or lesions      LABS:  12-26    144  |  106  |  20<H>  ----------------------------<  223<H>  3.8   |  32<H>  |  0.66    Ca    9.0      26 Dec 2022 07:25  Phos  2.4     12-26  Mg     2.4     12-26    TPro  7.5  /  Alb  2.8<L>  /  TBili  0.3  /  DBili  x   /  AST  14  /  ALT  22  /  AlkPhos  67  12-26    Creatinine Trend: 0.66<--, 0.72<--, 0.71<--, 0.78<--                        12.4   11.63 )-----------( 229      ( 26 Dec 2022 07:25 )             39.7           RADIOLOGY:   XR CHEST PORTABLE IMMED 1V    IMPRESSION: There is mild patchy opacification the lungs appears  compatible with mild pulmonary edema. The heart is normal in size. There  are severe degenerative changes of the shoulders bilaterally.    ECG [my interpretation]:  Sinus tachycardia  Left axis deviation  Possible Lateral infarct , age undetermined    ECHO:    STRESS TEST:    CATHETERIZATION:

## 2022-12-27 NOTE — CONSULT NOTE ADULT - ASSESSMENT
77 year old F with PMH of HTN, hypothyroidism, CHF, COPD, bipolar d/o, schizoaffective d/o, DM presents after fall. Pt reports she fell from her wheelchair, denies LOC but reports head trauma and left lower leg trauma and is now pain free.    Was admitted to  for  COPD exacerbation and s/p fall at nursing home. CXR shows mild patchy opacification the lungs appears compatible with mild pulmonary edema.  CTH shows +Hematome forehead. No bleeding or midline shift noted.  Pt is unable to titrate oxygen off this time-86% on room air. Placed on 2L NC.

## 2022-12-27 NOTE — PROGRESS NOTE ADULT - ASSESSMENT
77 year old F with PMH of HTN, hypothyroidism, CHF, COPD, bipolar d/o, schizoaffective d/o, DM presents after fall. Pt reports she fell from her wheelchair, denies LOC but reports head trauma and left lower leg trauma and is now pain free. Denies pain, fever, chills, shortness of breath, cough, wheezing, chest pain, lightheadedness, dizziness, n/v/d, changes in urinary or bowel habits.    ED Course:  Vitals: /69 P 120 R 18 T 98.2F > 102.2F  SpO2 97% RA  Meds: Unasyn, solumedrol 125 mg IV, Duonebs x 2    Patient is admitted to  for  COPD exacerbation and s/p fall at nursing home. CXR shows mild patchy opacification the lungs appears compatible with mild pulmonary edema.  CTH shows +Hematome forehead. No bleeding or midline shift noted.  Pt is unable to titrate oxygen off this time-86% on room air. Placed on 2L NC.     12/26 Medically stable, downgraded to medicine under dr. Leavitt service (covering  MD dr. Cordova today)  PT consulted, IV solumedrol changed to po. D/C'd Unasyn.   Family refuses pt back to [facility],  to follow up to facilitate different placement.   INCOMPLETE::::12/27 77 year old F with PMH of HTN, hypothyroidism, CHF, COPD, bipolar d/o, schizoaffective d/o, DM presents after fall. Pt reports she fell from her wheelchair, denies LOC but reports head trauma and left lower leg trauma and is now pain free. Denies pain, fever, chills, shortness of breath, cough, wheezing, chest pain, lightheadedness, dizziness, n/v/d, changes in urinary or bowel habits.    ED Course:  Vitals: /69 P 120 R 18 T 98.2F > 102.2F  SpO2 97% RA  Meds: Unasyn, solumedrol 125 mg IV, Duonebs x 2    Patient is admitted to  for  COPD exacerbation and s/p fall at nursing home. CXR shows mild patchy opacification the lungs appears compatible with mild pulmonary edema.  CTH shows +Hematome forehead. No bleeding or midline shift noted.  Pt is unable to titrate oxygen off this time-86% on room air. Placed on 2L NC.     12/26 Medically stable, downgraded to medicine under dr. Leavitt service (covering  MD dr. Cordova today)  PT consulted, IV solumedrol changed to po. D/C'd Unasyn.   Family refuses pt back to [facility],  to follow up to facilitate different placement.   12/27- Pt remained stable.  Labs reviewed for past few days, CXR w/ pulm edema and responding well to PO lasix.  Na is slightly elevated, would not start treatment for now in setting of pulm edema since no changes in mentation.  Hence, f/u AM BMP.  Cards following, pending TTE.  PT recs [facility].

## 2022-12-27 NOTE — PROGRESS NOTE ADULT - PROBLEM SELECTOR PLAN 1
Admitted with fever 102.2  Given vanco and Unasyn in ED.  CXR shows There is mild patchy opacification the lungs appears compatible with mild pulmonary edema.     started Unasyn.  Serum lactate normal.  blood cultures and urine culture-NGTD  d/c'd Unasyn Admitted with fever 102.2  Given vanco and Unasyn in ED.  CXR shows There is mild patchy opacification the lungs appears compatible with mild pulmonary edema.     Serum lactate normal.  blood cultures and urine culture-NGTD  d/c'd Unasyn  continue to monitor off Abx.

## 2022-12-28 LAB
ANION GAP SERPL CALC-SCNC: 6 MMOL/L — SIGNIFICANT CHANGE UP (ref 5–17)
BUN SERPL-MCNC: 29 MG/DL — HIGH (ref 7–18)
CALCIUM SERPL-MCNC: 8.6 MG/DL — SIGNIFICANT CHANGE UP (ref 8.4–10.5)
CHLORIDE SERPL-SCNC: 100 MMOL/L — SIGNIFICANT CHANGE UP (ref 96–108)
CO2 SERPL-SCNC: 33 MMOL/L — HIGH (ref 22–31)
CREAT SERPL-MCNC: 0.74 MG/DL — SIGNIFICANT CHANGE UP (ref 0.5–1.3)
EGFR: 84 ML/MIN/1.73M2 — SIGNIFICANT CHANGE UP
GLUCOSE BLDC GLUCOMTR-MCNC: 157 MG/DL — HIGH (ref 70–99)
GLUCOSE BLDC GLUCOMTR-MCNC: 157 MG/DL — HIGH (ref 70–99)
GLUCOSE BLDC GLUCOMTR-MCNC: 165 MG/DL — HIGH (ref 70–99)
GLUCOSE BLDC GLUCOMTR-MCNC: 168 MG/DL — HIGH (ref 70–99)
GLUCOSE BLDC GLUCOMTR-MCNC: 208 MG/DL — HIGH (ref 70–99)
GLUCOSE SERPL-MCNC: 255 MG/DL — HIGH (ref 70–99)
MAGNESIUM SERPL-MCNC: 2.2 MG/DL — SIGNIFICANT CHANGE UP (ref 1.6–2.6)
PHOSPHATE SERPL-MCNC: 3.5 MG/DL — SIGNIFICANT CHANGE UP (ref 2.5–4.5)
POTASSIUM SERPL-MCNC: 3.9 MMOL/L — SIGNIFICANT CHANGE UP (ref 3.5–5.3)
POTASSIUM SERPL-SCNC: 3.9 MMOL/L — SIGNIFICANT CHANGE UP (ref 3.5–5.3)
SODIUM SERPL-SCNC: 139 MMOL/L — SIGNIFICANT CHANGE UP (ref 135–145)
T4 AB SER-ACNC: 13.5 UG/DL — HIGH (ref 4.6–12)
TSH SERPL-MCNC: 0.18 UU/ML — LOW (ref 0.34–4.82)

## 2022-12-28 PROCEDURE — 73502 X-RAY EXAM HIP UNI 2-3 VIEWS: CPT | Mod: 26,LT

## 2022-12-28 PROCEDURE — 99233 SBSQ HOSP IP/OBS HIGH 50: CPT | Mod: FS

## 2022-12-28 RX ADMIN — ALBUTEROL 2 PUFF(S): 90 AEROSOL, METERED ORAL at 21:57

## 2022-12-28 RX ADMIN — ALBUTEROL 2 PUFF(S): 90 AEROSOL, METERED ORAL at 08:42

## 2022-12-28 RX ADMIN — BUDESONIDE AND FORMOTEROL FUMARATE DIHYDRATE 2 PUFF(S): 160; 4.5 AEROSOL RESPIRATORY (INHALATION) at 12:40

## 2022-12-28 RX ADMIN — Medication 1 DROP(S): at 17:50

## 2022-12-28 RX ADMIN — Medication 200 MICROGRAM(S): at 05:11

## 2022-12-28 RX ADMIN — OLANZAPINE 10 MILLIGRAM(S): 15 TABLET, FILM COATED ORAL at 12:39

## 2022-12-28 RX ADMIN — Medication 1: at 17:12

## 2022-12-28 RX ADMIN — LACTULOSE 20 GRAM(S): 10 SOLUTION ORAL at 05:12

## 2022-12-28 RX ADMIN — Medication 2: at 13:19

## 2022-12-28 RX ADMIN — ENOXAPARIN SODIUM 40 MILLIGRAM(S): 100 INJECTION SUBCUTANEOUS at 05:17

## 2022-12-28 RX ADMIN — Medication 81 MILLIGRAM(S): at 12:39

## 2022-12-28 RX ADMIN — Medication 20 MILLIGRAM(S): at 05:11

## 2022-12-28 RX ADMIN — Medication 1 DROP(S): at 06:18

## 2022-12-28 RX ADMIN — ALBUTEROL 2 PUFF(S): 90 AEROSOL, METERED ORAL at 17:50

## 2022-12-28 RX ADMIN — LACTULOSE 20 GRAM(S): 10 SOLUTION ORAL at 17:49

## 2022-12-28 RX ADMIN — Medication 40 MILLIGRAM(S): at 05:12

## 2022-12-28 RX ADMIN — Medication 1: at 08:23

## 2022-12-28 RX ADMIN — AMLODIPINE BESYLATE 10 MILLIGRAM(S): 2.5 TABLET ORAL at 05:11

## 2022-12-28 RX ADMIN — BUDESONIDE AND FORMOTEROL FUMARATE DIHYDRATE 2 PUFF(S): 160; 4.5 AEROSOL RESPIRATORY (INHALATION) at 21:58

## 2022-12-28 NOTE — PROGRESS NOTE ADULT - PROBLEM SELECTOR PLAN 9
DVT and GI prophylaxis  Monitor neuro status and size of hematoma  Continue oxygen support. Titrate as tolerated.  From [facility]. Does not want to go back to [facility].  CM following for different placement.  PT rec [facility].  F/U left hip xray.

## 2022-12-28 NOTE — PROGRESS NOTE ADULT - PROBLEM SELECTOR PLAN 1
Admitted with fever 102.2  Given vanco and Unasyn in ED.  CXR shows There is mild patchy opacification the lungs appears compatible with mild pulmonary edema.     Serum lactate normal.  blood cultures and urine culture-NGTD  d/c'd Unasyn  continue to monitor off Abiotics

## 2022-12-28 NOTE — DIETITIAN INITIAL EVALUATION ADULT - CALCULATED FROM (G/KG)
Pt arrived to ED with c/o R ring finger pain. Bone is obviously displaced but no open fracture. Happened today.   88.4

## 2022-12-28 NOTE — PROGRESS NOTE ADULT - PROBLEM SELECTOR PLAN 2
S/P mechanical fall at NH  +trauma/hematoma to forehead. Denying any pain.  CT +Hematome forehead. No bleeding or midline shift noted on CT scan.  Monitor size of hematoma.  C/O left hip pain. Left hip xray ordered. F/U xray results.  Tylenol only for pain.  Maintain fall and safety measures.  Monitor neuro status.  PT recs [facility].

## 2022-12-28 NOTE — PROGRESS NOTE ADULT - ASSESSMENT
77 year old F with PMH of HTN, hypothyroidism, CHF, COPD, bipolar d/o, schizoaffective d/o, DM presents after fall. Pt reports she fell from her wheelchair, denies LOC but reports head trauma and left lower leg trauma and is now pain free. Denies pain, fever, chills, shortness of breath, cough, wheezing, chest pain, lightheadedness, dizziness, n/v/d, changes in urinary or bowel habits.  ED Course:  Vitals: /69 P 120 R 18 T 98.2F > 102.2F  SpO2 97% RA  Meds: Unasyn, solumedrol 125 mg IV, Duonebs x 2  Patient is admitted to  for  COPD exacerbation and s/p fall at nursing home. CXR shows mild patchy opacification the lungs appears compatible with mild pulmonary edema.  CTH shows +Hematome forehead. No bleeding or midline shift noted.  Pt is unable to titrate oxygen off this time-86% on room air. Placed on 2L NC.   12/26 Medically stable, downgraded to medicine under dr. Leavitt service (covering  MD dr. Cordova today)  PT consulted, IV solumedrol changed to po. D/C'd Unasyn.   Family refuses pt back to [facility],  to follow up to facilitate different placement.   12/27- Pt remained stable.  Labs reviewed for past few days, CXR w/ pulm edema and responding well to PO lasix.  Na is slightly elevated, would not start treatment for now in setting of pulm edema since no changes in mentation.  Hence, f/u AM BMP.  Cards following, pending TTE.  PT recs [facility].

## 2022-12-28 NOTE — DIETITIAN INITIAL EVALUATION ADULT - OTHER INFO
Pt Visited.  Pt seen for LOS. Observed Lunch meal. Pt ate 100 % of meal. Pt Reports NKFA. Po tolerated. Per Pt HT 63 inches, Bed scale 202 Lb. Food choices Updated. Pt requesting for Diet jello. F & N dept notified . Labs noted. BUn 29, . Pt transferred from 6 S to 4 N on 12/24. Current diet tolerated.

## 2022-12-28 NOTE — PROGRESS NOTE ADULT - SUBJECTIVE AND OBJECTIVE BOX
NP Note discussed with  Primary Attending    Patient is a 77y old  Female who presents with a chief complaint of Fall (27 Dec 2022 14:37)      INTERVAL HPI/OVERNIGHT EVENTS:  C/O left hip pain    MEDICATIONS  (STANDING):  albuterol    90 MICROgram(s) HFA Inhaler 2 Puff(s) Inhalation every 6 hours  amLODIPine   Tablet 10 milliGRAM(s) Oral daily  artificial tears (preservative free) Ophthalmic Solution 1 Drop(s) Both EYES two times a day  aspirin  chewable 81 milliGRAM(s) Oral daily  budesonide 160 MICROgram(s)/formoterol 4.5 MICROgram(s) Inhaler 2 Puff(s) Inhalation two times a day  enoxaparin Injectable 40 milliGRAM(s) SubCutaneous every 24 hours  furosemide    Tablet 40 milliGRAM(s) Oral daily  insulin lispro (ADMELOG) corrective regimen sliding scale   SubCutaneous three times a day before meals  insulin lispro (ADMELOG) corrective regimen sliding scale   SubCutaneous at bedtime  lactulose Syrup 20 Gram(s) Oral two times a day  levothyroxine 200 MICROGram(s) Oral daily  OLANZapine 10 milliGRAM(s) Oral daily  predniSONE   Tablet 20 milliGRAM(s) Oral daily    MEDICATIONS  (PRN):  acetaminophen     Tablet .. 650 milliGRAM(s) Oral every 6 hours PRN Temp greater or equal to 38C (100.4F), Mild Pain (1 - 3)  traMADol 25 milliGRAM(s) Oral every 6 hours PRN Moderate Pain (4 - 6)  traMADol 50 milliGRAM(s) Oral every 6 hours PRN Severe Pain (7 - 10)      __________________________________________________  REVIEW OF SYSTEMS:    CONSTITUTIONAL: No fever,   EYES: no acute visual disturbances  NECK: No pain or stiffness  RESPIRATORY: Non-productive cough; No shortness of breath  CARDIOVASCULAR: No chest pain, no palpitations  GASTROINTESTINAL: No pain. No nausea or vomiting; No diarrhea   NEUROLOGICAL: No headache or numbness, no tremors  MUSCULOSKELETAL: +Left hip pain  GENITOURINARY: no dysuria, no frequency, no hesitancy  PSYCHIATRY: no depression , no anxiety  ALL OTHER  ROS negative        Vital Signs Last 24 Hrs  T(C): 36.1 (28 Dec 2022 04:52), Max: 36.7 (27 Dec 2022 21:29)  T(F): 97 (28 Dec 2022 04:52), Max: 98 (27 Dec 2022 21:29)  HR: 78 (28 Dec 2022 04:52) (78 - 87)  BP: 137/75 (28 Dec 2022 04:52) (103/65 - 142/68)  BP(mean): --  RR: 19 (28 Dec 2022 04:52) (16 - 19)  SpO2: 95% (28 Dec 2022 04:52) (94% - 95%)    Parameters below as of 28 Dec 2022 04:52  Patient On (Oxygen Delivery Method): nasal cannula  O2 Flow (L/min): 2      ________________________________________________  PHYSICAL EXAM:  GENERAL: NAD  HEENT: left periorbital ecchymosis. Normocephalic;  conjunctivae and sclerae clear; moist mucous membranes;   NECK : supple, No JVD  CHEST/LUNG: Diminished breath sounds bilateral bases. Faint crackles left base.  HEART: S1 S2  regular; no murmurs, gallops or rubs  ABDOMEN: Soft, Nontender, Nondistended; Bowel sounds present  EXTREMITIES: no cyanosis; no edema; no calf tenderness  SKIN: Bilateral lower extremities with chronic venous stasis changes.  NERVOUS SYSTEM:  Awake and alert; Oriented  to place, person and time ; no new deficits    _________________________________________________  LABS:                        12.6   7.94  )-----------( 222      ( 27 Dec 2022 08:25 )             41.6     12-27    147<H>  |  106  |  23<H>  ----------------------------<  171<H>  3.9   |  35<H>  |  0.63    Ca    8.8      27 Dec 2022 08:25  Phos  2.8     12-27  Mg     2.4     12-27    TPro  7.1  /  Alb  2.7<L>  /  TBili  0.4  /  DBili  x   /  AST  22  /  ALT  37  /  AlkPhos  64  12-27        CAPILLARY BLOOD GLUCOSE      POCT Blood Glucose.: 157 mg/dL (28 Dec 2022 07:42)  POCT Blood Glucose.: 163 mg/dL (27 Dec 2022 22:01)  POCT Blood Glucose.: 155 mg/dL (27 Dec 2022 16:36)  POCT Blood Glucose.: 187 mg/dL (27 Dec 2022 11:29)        RADIOLOGY & ADDITIONAL TESTS:    Imaging Personally Reviewed:  YES  < from: Xray Chest 1 View-PORTABLE IMMEDIATE (12.23.22 @ 18:52) >  IMPRESSION: There is mild patchy opacification the lungs appears   compatible with mild pulmonary edema. The heart is normal in size. There   are severe degenerative changes of the shoulders bilaterally.    < end of copied text >  < from: CT Head No Cont (12.23.22 @ 21:19) >    FINDINGS: No intracranial hemorrhage is seen. The grey-white   differentiation is maintained. Mild periventricular and subcortical white   matter hypoattenuation without mass effect is noted, non-specific, but   likely related to chronic small vessel ischemic changes.    Cerebral volume loss is present with proportional prominence of the sulci   and ventricles. No mass effect or midline shift is seen. Basal cisterns   are not effaced.    The visualized paranasal sinuses and tympanomastoid cavities are clear.   Evidence of prior bilateral ocular lens surgery.    Left frontotemporal scalp hematoma. No displaced calvarial fracture.    IMPRESSION: No intracranial hemorrhage or mass effect. Left   frontotemporal scalp hematoma.    --- End of Report ---    < end of copied text >      Consultant(s) Notes Reviewed:   YES    Care Discussed with Consultants : YES    Plan of care was discussed with patient and /or primary care giver; all questions and concerns were addressed and care was aligned with patient's wishes.

## 2022-12-28 NOTE — PROGRESS NOTE ADULT - NS ATTEND AMEND GEN_ALL_CORE FT
77 year old F with PMH of HTN, hypothyroidism, CHF, COPD, bipolar d/o, schizoaffective d/o, DM presents after fall from her wheelchair. Admitted for Sepsis 2/2 cellulitis?, copd exacerbation and fall. She was initially admitted to ICU and later down graded to [facility]     Patient was seen and examined this evening. Reports feeling well, denies shortness of breath. VS stable, remains afebrile     Labs reviewed     PE as above     A/P:  #COPD exacerbation   #Sepsis - resolved   #Pulmonary edema 2/2 diastolic dysfunction   #Hypothyroidism   #S/p mechanical fall   #Hematoma of face     Plan:  -Patient is over all improving, c/w prednisone for 5 days, Symbicort. wean off O2 as tolerated   -ECHO noted w. normal EF, grade 1 DD. C/w Lasix 40 home dose. Appreciate cardio recs   -TSH low, follow Free T3, T4. c/w levothyroxine   -DC planning to [facility]

## 2022-12-28 NOTE — DIETITIAN INITIAL EVALUATION ADULT - PERTINENT LABORATORY DATA
12-28    139  |  100  |  29<H>  ----------------------------<  255<H>  3.9   |  33<H>  |  0.74    Ca    8.6      28 Dec 2022 08:55  Phos  3.5     12-28  Mg     2.2     12-28    TPro  7.1  /  Alb  2.7<L>  /  TBili  0.4  /  DBili  x   /  AST  22  /  ALT  37  /  AlkPhos  64  12-27  POCT Blood Glucose.: 208 mg/dL (12-28-22 @ 13:09)  A1C with Estimated Average Glucose Result: 5.8 % (12-24-22 @ 04:45)

## 2022-12-29 LAB
ALBUMIN SERPL ELPH-MCNC: 2.6 G/DL — LOW (ref 3.5–5)
ALP SERPL-CCNC: 71 U/L — SIGNIFICANT CHANGE UP (ref 40–120)
ALT FLD-CCNC: 38 U/L DA — SIGNIFICANT CHANGE UP (ref 10–60)
ANION GAP SERPL CALC-SCNC: 7 MMOL/L — SIGNIFICANT CHANGE UP (ref 5–17)
AST SERPL-CCNC: 14 U/L — SIGNIFICANT CHANGE UP (ref 10–40)
BILIRUB SERPL-MCNC: 0.2 MG/DL — SIGNIFICANT CHANGE UP (ref 0.2–1.2)
BUN SERPL-MCNC: 26 MG/DL — HIGH (ref 7–18)
CALCIUM SERPL-MCNC: 9 MG/DL — SIGNIFICANT CHANGE UP (ref 8.4–10.5)
CHLORIDE SERPL-SCNC: 105 MMOL/L — SIGNIFICANT CHANGE UP (ref 96–108)
CO2 SERPL-SCNC: 31 MMOL/L — SIGNIFICANT CHANGE UP (ref 22–31)
CREAT SERPL-MCNC: 0.76 MG/DL — SIGNIFICANT CHANGE UP (ref 0.5–1.3)
CULTURE RESULTS: SIGNIFICANT CHANGE UP
CULTURE RESULTS: SIGNIFICANT CHANGE UP
EGFR: 82 ML/MIN/1.73M2 — SIGNIFICANT CHANGE UP
GLUCOSE BLDC GLUCOMTR-MCNC: 118 MG/DL — HIGH (ref 70–99)
GLUCOSE BLDC GLUCOMTR-MCNC: 126 MG/DL — HIGH (ref 70–99)
GLUCOSE BLDC GLUCOMTR-MCNC: 131 MG/DL — HIGH (ref 70–99)
GLUCOSE BLDC GLUCOMTR-MCNC: 176 MG/DL — HIGH (ref 70–99)
GLUCOSE SERPL-MCNC: 151 MG/DL — HIGH (ref 70–99)
HCT VFR BLD CALC: 42 % — SIGNIFICANT CHANGE UP (ref 34.5–45)
HGB BLD-MCNC: 12.8 G/DL — SIGNIFICANT CHANGE UP (ref 11.5–15.5)
MAGNESIUM SERPL-MCNC: 2.4 MG/DL — SIGNIFICANT CHANGE UP (ref 1.6–2.6)
MCHC RBC-ENTMCNC: 25.8 PG — LOW (ref 27–34)
MCHC RBC-ENTMCNC: 30.5 GM/DL — LOW (ref 32–36)
MCV RBC AUTO: 84.5 FL — SIGNIFICANT CHANGE UP (ref 80–100)
NRBC # BLD: 0 /100 WBCS — SIGNIFICANT CHANGE UP (ref 0–0)
PHOSPHATE SERPL-MCNC: 3.5 MG/DL — SIGNIFICANT CHANGE UP (ref 2.5–4.5)
PLATELET # BLD AUTO: 258 K/UL — SIGNIFICANT CHANGE UP (ref 150–400)
POTASSIUM SERPL-MCNC: 3.7 MMOL/L — SIGNIFICANT CHANGE UP (ref 3.5–5.3)
POTASSIUM SERPL-SCNC: 3.7 MMOL/L — SIGNIFICANT CHANGE UP (ref 3.5–5.3)
PROT SERPL-MCNC: 6.6 G/DL — SIGNIFICANT CHANGE UP (ref 6–8.3)
RBC # BLD: 4.97 M/UL — SIGNIFICANT CHANGE UP (ref 3.8–5.2)
RBC # FLD: 17.6 % — HIGH (ref 10.3–14.5)
SODIUM SERPL-SCNC: 143 MMOL/L — SIGNIFICANT CHANGE UP (ref 135–145)
SPECIMEN SOURCE: SIGNIFICANT CHANGE UP
SPECIMEN SOURCE: SIGNIFICANT CHANGE UP
T3FREE SERPL-MCNC: 1.31 PG/ML — LOW (ref 2–4.4)
T4 FREE SERPL-MCNC: 1.8 NG/DL — SIGNIFICANT CHANGE UP (ref 0.9–1.8)
WBC # BLD: 11.65 K/UL — HIGH (ref 3.8–10.5)
WBC # FLD AUTO: 11.65 K/UL — HIGH (ref 3.8–10.5)

## 2022-12-29 PROCEDURE — 99233 SBSQ HOSP IP/OBS HIGH 50: CPT | Mod: FS

## 2022-12-29 PROCEDURE — 99222 1ST HOSP IP/OBS MODERATE 55: CPT

## 2022-12-29 RX ORDER — LEVOTHYROXINE SODIUM 125 MCG
150 TABLET ORAL DAILY
Refills: 0 | Status: DISCONTINUED | OUTPATIENT
Start: 2022-12-30 | End: 2023-01-05

## 2022-12-29 RX ORDER — TRAMADOL HYDROCHLORIDE 50 MG/1
1 TABLET ORAL
Qty: 0 | Refills: 0 | DISCHARGE
Start: 2022-12-29

## 2022-12-29 RX ORDER — TRAMADOL HYDROCHLORIDE 50 MG/1
0.5 TABLET ORAL
Qty: 0 | Refills: 0 | DISCHARGE
Start: 2022-12-29

## 2022-12-29 RX ADMIN — LACTULOSE 20 GRAM(S): 10 SOLUTION ORAL at 05:57

## 2022-12-29 RX ADMIN — ALBUTEROL 2 PUFF(S): 90 AEROSOL, METERED ORAL at 22:13

## 2022-12-29 RX ADMIN — OLANZAPINE 10 MILLIGRAM(S): 15 TABLET, FILM COATED ORAL at 11:52

## 2022-12-29 RX ADMIN — BUDESONIDE AND FORMOTEROL FUMARATE DIHYDRATE 2 PUFF(S): 160; 4.5 AEROSOL RESPIRATORY (INHALATION) at 11:52

## 2022-12-29 RX ADMIN — Medication 1 DROP(S): at 05:56

## 2022-12-29 RX ADMIN — Medication 40 MILLIGRAM(S): at 05:58

## 2022-12-29 RX ADMIN — ALBUTEROL 2 PUFF(S): 90 AEROSOL, METERED ORAL at 14:53

## 2022-12-29 RX ADMIN — Medication 1 DROP(S): at 17:23

## 2022-12-29 RX ADMIN — Medication 200 MICROGRAM(S): at 05:58

## 2022-12-29 RX ADMIN — ALBUTEROL 2 PUFF(S): 90 AEROSOL, METERED ORAL at 05:55

## 2022-12-29 RX ADMIN — BUDESONIDE AND FORMOTEROL FUMARATE DIHYDRATE 2 PUFF(S): 160; 4.5 AEROSOL RESPIRATORY (INHALATION) at 22:13

## 2022-12-29 RX ADMIN — AMLODIPINE BESYLATE 10 MILLIGRAM(S): 2.5 TABLET ORAL at 05:57

## 2022-12-29 RX ADMIN — Medication 81 MILLIGRAM(S): at 11:51

## 2022-12-29 RX ADMIN — Medication 20 MILLIGRAM(S): at 06:01

## 2022-12-29 RX ADMIN — ALBUTEROL 2 PUFF(S): 90 AEROSOL, METERED ORAL at 11:52

## 2022-12-29 RX ADMIN — ENOXAPARIN SODIUM 40 MILLIGRAM(S): 100 INJECTION SUBCUTANEOUS at 05:57

## 2022-12-29 NOTE — PROGRESS NOTE ADULT - NS ATTEND AMEND GEN_ALL_CORE FT
Impression: This is a 78 Y/O Female from [facility] .Presented to ED due to had mechanical fall from wheelchair with head trauma. CT shows left frontotemporal scalp hematoma. O2 saturation 86% room air due to Acute hypoxic respiratory failure secondary to COPD. CXR on 12-23-22 Mild Lung opacity due to chronic diastolic CHF. Negative swab on 12-23-22 for Covid 19. Negative RVP, Blood Cx x2 .  Admitted also with Sepsis due to LE Cellulitis.     Suggestion:  O2 saturation 90% room air at rest , with O2 supplementation with O2 saturation 96% . Continue Oxygen supplementation 2L NC. Monitor O2 saturation trend room air.   Continue Albuterol 90 mcg 2 Puffs Q 6 Hours.  Continue Symbicort 160-.4.5 mcg 2 Puffs Twice Daily.    DVT / GI prophylactic . On Lovenox 40 mg SQ Daily.   On Prednisone 20 mg Oral Daily.

## 2022-12-29 NOTE — PROGRESS NOTE ADULT - PROBLEM SELECTOR PLAN 1
Admitted with fever 102.2  Given vanco and Unasyn in ED.  CXR shows There is mild patchy opacification the lungs appears compatible with mild pulmonary edema.     Serum lactate normal.  blood cultures and urine culture-NGTD  d/c'd Unasyn  continue to monitor off Abiotics.

## 2022-12-29 NOTE — PROGRESS NOTE ADULT - PROBLEM SELECTOR PLAN 9
DVT and GI prophylaxis  Monitor neuro status and size of hematoma  Continue oxygen support. Titrate as tolerated.  From [facility]. Does not want to go back to [facility].  CM following for different placement.  PT rec [facility].  No acute fractures visualized on left hip xrays.

## 2022-12-29 NOTE — CONSULT NOTE ADULT - TIME BILLING
reviewing records/charts/labs, interview and physical examination, coordination of care with patient and primary team, and documenting clinical information. 50 % of time spent in face to face extensive counseling on hypothyroidism and levothyroxine risk and benefits

## 2022-12-29 NOTE — PROGRESS NOTE ADULT - SUBJECTIVE AND OBJECTIVE BOX
Time of Visit:  Patient seen and examined.     MEDICATIONS  (STANDING):  albuterol    90 MICROgram(s) HFA Inhaler 2 Puff(s) Inhalation every 6 hours  amLODIPine   Tablet 10 milliGRAM(s) Oral daily  artificial tears (preservative free) Ophthalmic Solution 1 Drop(s) Both EYES two times a day  aspirin  chewable 81 milliGRAM(s) Oral daily  budesonide 160 MICROgram(s)/formoterol 4.5 MICROgram(s) Inhaler 2 Puff(s) Inhalation two times a day  enoxaparin Injectable 40 milliGRAM(s) SubCutaneous every 24 hours  furosemide    Tablet 40 milliGRAM(s) Oral daily  insulin lispro (ADMELOG) corrective regimen sliding scale   SubCutaneous three times a day before meals  insulin lispro (ADMELOG) corrective regimen sliding scale   SubCutaneous at bedtime  lactulose Syrup 20 Gram(s) Oral two times a day  OLANZapine 10 milliGRAM(s) Oral daily  predniSONE   Tablet 20 milliGRAM(s) Oral daily      MEDICATIONS  (PRN):  acetaminophen     Tablet .. 650 milliGRAM(s) Oral every 6 hours PRN Temp greater or equal to 38C (100.4F), Mild Pain (1 - 3)  traMADol 25 milliGRAM(s) Oral every 6 hours PRN Moderate Pain (4 - 6)  traMADol 50 milliGRAM(s) Oral every 6 hours PRN Severe Pain (7 - 10)       Medications up to date at time of exam.    ROS; No fever, chills, cough, nasal congestion on exam.   PHYSICAL EXAMINATION:    Vital Signs Last 24 Hrs  T(C): 36.6 (29 Dec 2022 13:59), Max: 36.6 (29 Dec 2022 13:59)  T(F): 97.8 (29 Dec 2022 13:59), Max: 97.8 (29 Dec 2022 13:59)  HR: 95 (29 Dec 2022 13:59) (88 - 96)  BP: 126/67 (29 Dec 2022 13:59) (126/67 - 148/62)  BP(mean): --  RR: 17 (29 Dec 2022 13:59) (17 - 18)  SpO2: 90% (29 Dec 2022 13:59) (90% - 97%)    Parameters below as of 29 Dec 2022 13:59  Patient On (Oxygen Delivery Method): room air       (if applicable)    General : Alert and oriented, forgetful. Able to answer question with no SOB.  No acute distress.     HEENT: Head is normocephalic and atraumatic. Left periorbital Ecchymosis no swelling .  No nasal tenderness.  Mucous membranes are moist.     NECK: Supple, no palpable adenopathy.    LUNGS: Fair air entrance . Scattered Crackles , no wheezing. No use of accessory muscle.      HEART: S1 S2 Regular rate and no click/ rub.     ABDOMEN: Soft, nontender, and nondistended.  No hepatosplenomegaly is noted.    EXTREMITIES: Without any cyanosis, clubbing, rash, lesions or edema.    NEUROLOGIC: Awake, alert, oriented.     SKIN: Warm, dry, good turgor.      LABS:                        12.8   11.65 )-----------( 258      ( 29 Dec 2022 07:30 )             42.0     12-29    143  |  105  |  26<H>  ----------------------------<  151<H>  3.7   |  31  |  0.76    Ca    9.0      29 Dec 2022 07:30  Phos  3.5     12-29  Mg     2.4     12-29    TPro  6.6  /  Alb  2.6<L>  /  TBili  0.2  /  DBili  x   /  AST  14  /  ALT  38  /  AlkPhos  71  12-29                        MICROBIOLOGY: (if applicable)    RADIOLOGY & ADDITIONAL STUDIES:  EKG:   CXR:  ECHO:    IMPRESSION: 77y Female PAST MEDICAL & SURGICAL HISTORY:  HTN (hypertension)      Schizo affective schizophrenia      Bipolar 1 disorder      Hypothyroidism      Arthritis      Hypercholesteremia      Chronic obstructive pulmonary disease, unspecified COPD type      Chronic diastolic congestive heart failure      Interstitial lung disease      Poor historian      No significant past surgical history    Impression: This is a 76 Y/O Female from [facility] .Presented to ED due to had mechanical fall from wheelchair with head trauma. CT shows left frontotemporal scalp hematoma. O2 saturation 86% room air due to Acute hypoxic respiratory failure secondary to COPD. CXR on 12-23-22 Mild Lung opacity due to chronic diastolic CHF. Negative swab on 12-23-22 for Covid 19. Negative RVP, Blood Cx x2 .  Admitted also with Sepsis due to LE Cellulitis.     Suggestion:  O2 saturation 90% room air at rest , with O2 supplementation with O2 saturation 96% . Continue Oxygen supplementation 2L NC. Monitor O2 saturation trend room air.   Continue Albuterol 90 mcg 2 Puffs Q 6 Hours.  Continue Symbicort 160-.4.5 mcg 2 Puffs Twice Daily.    DVT / GI prophylactic . On Lovenox 40 mg SQ Daily.   On Prednisone 20 mg Oral Daily.

## 2022-12-29 NOTE — PROGRESS NOTE ADULT - SUBJECTIVE AND OBJECTIVE BOX
NP Note discussed with  Primary Attending    Patient is a 77y old  Female who presents with a chief complaint of Chronic obstructive pulmonary disease with acute exacerbation     (28 Dec 2022 13:18)      INTERVAL HPI/OVERNIGHT EVENTS: no new complaints    MEDICATIONS  (STANDING):  albuterol    90 MICROgram(s) HFA Inhaler 2 Puff(s) Inhalation every 6 hours  amLODIPine   Tablet 10 milliGRAM(s) Oral daily  artificial tears (preservative free) Ophthalmic Solution 1 Drop(s) Both EYES two times a day  aspirin  chewable 81 milliGRAM(s) Oral daily  budesonide 160 MICROgram(s)/formoterol 4.5 MICROgram(s) Inhaler 2 Puff(s) Inhalation two times a day  enoxaparin Injectable 40 milliGRAM(s) SubCutaneous every 24 hours  furosemide    Tablet 40 milliGRAM(s) Oral daily  insulin lispro (ADMELOG) corrective regimen sliding scale   SubCutaneous three times a day before meals  insulin lispro (ADMELOG) corrective regimen sliding scale   SubCutaneous at bedtime  lactulose Syrup 20 Gram(s) Oral two times a day  levothyroxine 200 MICROGram(s) Oral daily  OLANZapine 10 milliGRAM(s) Oral daily  predniSONE   Tablet 20 milliGRAM(s) Oral daily    MEDICATIONS  (PRN):  acetaminophen     Tablet .. 650 milliGRAM(s) Oral every 6 hours PRN Temp greater or equal to 38C (100.4F), Mild Pain (1 - 3)  traMADol 25 milliGRAM(s) Oral every 6 hours PRN Moderate Pain (4 - 6)  traMADol 50 milliGRAM(s) Oral every 6 hours PRN Severe Pain (7 - 10)      __________________________________________________  REVIEW OF SYSTEMS:    CONSTITUTIONAL: No fever,   EYES: no acute visual disturbances  NECK: No pain or stiffness  RESPIRATORY: No cough; No shortness of breath  CARDIOVASCULAR: No chest pain, no palpitations  GASTROINTESTINAL: No pain. No nausea or vomiting; No diarrhea   NEUROLOGICAL: No headache or numbness, no tremors  MUSCULOSKELETAL: +Hip pain with movement.   GENITOURINARY: no dysuria, no frequency, no hesitancy  PSYCHIATRY: no depression , no anxiety  ALL OTHER  ROS negative        Vital Signs Last 24 Hrs  T(C): 36.1 (29 Dec 2022 05:00), Max: 36.3 (28 Dec 2022 11:48)  T(F): 97 (29 Dec 2022 05:00), Max: 97.4 (28 Dec 2022 11:48)  HR: 96 (29 Dec 2022 05:00) (84 - 96)  BP: 148/62 (29 Dec 2022 05:00) (115/64 - 148/62)  BP(mean): --  RR: 17 (29 Dec 2022 05:00) (16 - 18)  SpO2: 95% (29 Dec 2022 05:00) (94% - 97%)    Parameters below as of 29 Dec 2022 05:00  Patient On (Oxygen Delivery Method): room air        ________________________________________________  PHYSICAL EXAM:  GENERAL: NAD  HEENT: left periorbital ecchymosis. Normocephalic;   conjunctivae and sclerae clear; moist mucous membranes;   NECK : supple  CHEST/LUNG: Diminished breath sounds bilateral bases  HEART: S1 S2  regular; no murmurs, gallops or rubs  ABDOMEN: Soft, Nontender, Nondistended; Bowel sounds present  EXTREMITIES: no cyanosis; no edema; no calf tenderness  SKIN: Bilateral lower extremities with chronic venous stasis changes.  NERVOUS SYSTEM:  Awake and alert; Oriented  to place, person and time ; no new deficits    _________________________________________________  LABS:                        12.8   11.65 )-----------( 258      ( 29 Dec 2022 07:30 )             42.0     12-29    143  |  105  |  26<H>  ----------------------------<  151<H>  3.7   |  31  |  0.76    Ca    9.0      29 Dec 2022 07:30  Phos  3.5     12-29  Mg     2.4     12-29    TPro  6.6  /  Alb  2.6<L>  /  TBili  0.2  /  DBili  x   /  AST  14  /  ALT  38  /  AlkPhos  71  12-29        CAPILLARY BLOOD GLUCOSE      POCT Blood Glucose.: 126 mg/dL (29 Dec 2022 07:49)  POCT Blood Glucose.: 165 mg/dL (28 Dec 2022 20:47)  POCT Blood Glucose.: 157 mg/dL (28 Dec 2022 16:48)  POCT Blood Glucose.: 208 mg/dL (28 Dec 2022 13:09)  POCT Blood Glucose.: 168 mg/dL (28 Dec 2022 11:18)        RADIOLOGY & ADDITIONAL TESTS:    Imaging Personally Reviewed:  YES  < from: Xray Chest 1 View-PORTABLE IMMEDIATE (12.23.22 @ 18:52) >    IMPRESSION: There is mild patchy opacification the lungs appears   compatible with mild pulmonary edema. The heart is normal in size. There   are severe degenerative changes of the shoulders bilaterally.    < end of copied text >  < from: CT Head No Cont (12.23.22 @ 21:19) >  TECHNIQUE: Noncontrast axial CT images of the brain were acquired from   the base of skull to vertex.    COMPARISON: MRI brain 6/25/2015.    FINDINGS: No intracranial hemorrhage is seen. The grey-white   differentiation is maintained. Mild periventricular and subcortical white   matter hypoattenuation without mass effect is noted, non-specific, but   likely related to chronic small vessel ischemic changes.    Cerebral volume loss is present with proportional prominence of the sulci   and ventricles. No mass effect or midline shift is seen. Basal cisterns   are not effaced.    The visualized paranasal sinuses and tympanomastoid cavities are clear.   Evidence of prior bilateral ocular lens surgery.    Left frontotemporal scalp hematoma. No displaced calvarial fracture.    IMPRESSION: No intracranial hemorrhage or mass effect. Left   frontotemporal scalp hematoma.    --- End of Report ---      < end of copied text >  < from: Xray Hip 2-3 Views, Left (12.28.22 @ 10:37) >  FINDINGS:  3 views of theleft hip are submitted.  Redemonstration of chronic neck fracture of the left hip with chronic   dislocation of the left femoral head.  Redemonstration of chronic   atrophy/resorption of of left femoral head. 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 Probable chronic, post-traumatic, small, heterotopic ossifications   at superolateral aspect of the let pseudoacetabulum and adjacent to left   greater trochanter. No clear-cut acute bony fracture.  Partial visualization of the pubic screws and the pubic symphysis area.    IMPRESSION:  Sequelae of chronic left neck fracture-dislocation at the left hip.  No clear-cut new fracture seen.      < end of copied text >      Consultant(s) Notes Reviewed:   YES    Care Discussed with Consultants : YES    Plan of care was discussed with patient and /or primary care giver; all questions and concerns were addressed and care was aligned with patient's wishes.

## 2022-12-29 NOTE — PROGRESS NOTE ADULT - ASSESSMENT
77 year old F with PMH of HTN, hypothyroidism, CHF, COPD, bipolar d/o, schizoaffective d/o, DM presents after fall. Pt reports she fell from her wheelchair, denies LOC but reports head trauma and left lower leg trauma and is now pain free. Denies pain, fever, chills, shortness of breath, cough, wheezing, chest pain, lightheadedness, dizziness, n/v/d, changes in urinary or bowel habits.  ED Course:  Vitals: /69 P 120 R 18 T 98.2F > 102.2F  SpO2 97% RA  Meds: Unasyn, solumedrol 125 mg IV, Duonebs x 2  Patient is admitted to  for  COPD exacerbation and s/p fall at nursing home. CXR shows mild patchy opacification the lungs appears compatible with mild pulmonary edema.  CTH shows +Hematome forehead. No bleeding or midline shift noted.  Pt is unable to titrate oxygen off this time-86% on room air. Placed on 2L NC.   12/26 Medically stable, downgraded to medicine under dr. Leavitt service (covering  MD dr. Cordova today)  PT consulted, IV solumedrol changed to po. D/C'd Unasyn.   Family refuses pt back to [facility],  to follow up to facilitate different placement.   12/27- Pt remained stable.  Labs reviewed for past few days, CXR w/ pulm edema and responding well to PO lasix.  Na is slightly elevated, would not start treatment for now in setting of pulm edema since no changes in mentation.  Hence, f/u AM BMP.  Cards following, pending TTE.  PT recs [facility].

## 2022-12-29 NOTE — PROGRESS NOTE ADULT - NS ATTEND AMEND GEN_ALL_CORE FT
77 year old F with PMH of HTN, hypothyroidism, CHF, COPD, bipolar d/o, schizoaffective d/o, DM presents after fall from her wheelchair. Admitted for Sepsis 2/2 cellulitis?, copd exacerbation and fall. She was initially admitted to ICU and later down graded to [facility]     Patient was seen and examined, feeling well. No new complaints     Labs reviewed     PE as above     A/P:  #COPD exacerbation   #Sepsis - resolved   #Pulmonary edema 2/2 diastolic dysfunction   #Hypothyroidism   #S/p mechanical fall   #Hematoma of face     Plan:  -Patient is over all improving, c/w prednisone for 5 days, Symbicort. wean off O2 as tolerated   -ECHO noted w. normal EF, grade 1 DD. C/w Lasix 40 home dose. Appreciate cardio recs   -TSH low, follow Free T3, T4. c/w levothyroxine   -DC planning to [facility]. 77 year old F with PMH of HTN, hypothyroidism, CHF, COPD, bipolar d/o, schizoaffective d/o, DM presents after fall from her wheelchair. Admitted for Sepsis 2/2 cellulitis?, copd exacerbation and fall. She was initially admitted to ICU and later down graded to [facility]     Patient was seen and examined, feeling well. No new complaints     Labs reviewed     PE as above     A/P:  #COPD exacerbation   #Sepsis - resolved   #Pulmonary edema 2/2 diastolic dysfunction   #Hypothyroidism- now w/ low TSH   #S/p mechanical fall   #Hematoma of face/ scalp     Plan:  -Patient is over all improving, c/w prednisone for total 5 days, Symbicort. wean off O2 as tolerated   -ECHO noted w. normal EF, grade 1 DD. C/w Lasix 40 home dose. Appreciate cardio recs   -TSH low, follow Free T3, T4. Decreased levothyroxine to 150mcg. Check TPO Ab. Endo consulted   -DC planning to [facility].

## 2022-12-29 NOTE — CONSULT NOTE ADULT - SUBJECTIVE AND OBJECTIVE BOX
ENDOCRINE INITIAL CONSULT NOTE:    77y old Female who presents with a chief complaint of Fall (29 Dec 2022 09:25)    HPI:  77 year old F with PMH of HTN, hypothyroidism, CHF, COPD, bipolar d/o, schizoaffective d/o, DM presents after fall. Pt reports she fell from her wheelchair. Admitted for sepsis and fall. Endocrinology is consulted for abnormal TFTs    Patient seen at the bedside       Review of systems:  Constitutional: No fever, weight loss, fatigue, low energy, generalized weakness, poor appetite  Eyes: No redness, no dryness, no pain, no tearing, no gritty or gustavo feeling, no bulging  Cardiovascular/ Respiratory: No palpitations,, no chest pain, no shortness of breath, no exercise intolerance, no cough, no leg/ ankle swelling  Gastrointestinal: No trouble swallowing, no heart burn, no abdominal pain, no bloating, no nausea, no vomiting, no constipation, no diarrhea, no frequent bowel movements  Skin: No excessive hair growth, no hair loss, no acne, no excessive sweating, no rash, no easy bruising  Neurological: No headaches, no change in vision, no dizziness/ lightheadedness, no tremors, no numbness/ tingling in feet, no pain/ burning in feet, no trouble with balance, no muscular weakness.   Endocrine: Frequent urination, excessive urination, excessive thirst, symptoms     Past Medical and Surgical History:  HTN (hypertension)  Schizo affective schizophrenia  Bipolar 1 disorder  Hypothyroidism  Arthritis  Hypercholesteremia  Chronic obstructive pulmonary disease, unspecified COPD type  Chronic diastolic congestive heart failure  Interstitial lung disease    No significant past surgical history    Family History:    Social History:  Tobacco:  Alcohol:  illicit drug abuse:    Medications (Standing):  albuterol    90 MICROgram(s) HFA Inhaler 2 Puff(s) Inhalation every 6 hours  amLODIPine   Tablet 10 milliGRAM(s) Oral daily  artificial tears (preservative free) Ophthalmic Solution 1 Drop(s) Both EYES two times a day  aspirin  chewable 81 milliGRAM(s) Oral daily  budesonide 160 MICROgram(s)/formoterol 4.5 MICROgram(s) Inhaler 2 Puff(s) Inhalation two times a day  enoxaparin Injectable 40 milliGRAM(s) SubCutaneous every 24 hours  furosemide    Tablet 40 milliGRAM(s) Oral daily  insulin lispro (ADMELOG) corrective regimen sliding scale   SubCutaneous three times a day before meals  insulin lispro (ADMELOG) corrective regimen sliding scale   SubCutaneous at bedtime  lactulose Syrup 20 Gram(s) Oral two times a day  OLANZapine 10 milliGRAM(s) Oral daily  predniSONE   Tablet 20 milliGRAM(s) Oral daily    Medications (PRN):  acetaminophen     Tablet .. 650 milliGRAM(s) Oral every 6 hours PRN Temp greater or equal to 38C (100.4F), Mild Pain (1 - 3)  traMADol 25 milliGRAM(s) Oral every 6 hours PRN Moderate Pain (4 - 6)  traMADol 50 milliGRAM(s) Oral every 6 hours PRN Severe Pain (7 - 10)    Physical Examination  Vital Signs Last 24 Hrs  T(C): 36.1 (29 Dec 2022 05:00), Max: 36.1 (28 Dec 2022 20:33)  T(F): 97 (29 Dec 2022 05:00), Max: 97 (28 Dec 2022 20:33)  HR: 96 (29 Dec 2022 05:00) (88 - 96)  BP: 148/62 (29 Dec 2022 05:00) (127/62 - 148/62)  BP(mean): --  RR: 17 (29 Dec 2022 05:00) (17 - 18)  SpO2: 95% (29 Dec 2022 05:00) (95% - 97%)    Constitutional: No acute distress, ill- appearing, no anxious appearing, hyperkinetic, no diaphoretic  HEENT: Moist mucous membranes  Neck:  No JVD, bruits or thyromegaly, No thyroid nodules palpable, no LAD  Respiratory:  Respiratory effort normal, lungs clear to ausculation, without rales or rhonchi  Cardiovascular:  Regular heart rate, normal S1 and S2 sounds, without murmur, rub or gallop.  Gastrointestinal: Soft, non tender without hepatosplenomegaly and masses, no abdominal obesity  Extremities: Sensation intact to monofilament in feet, no cyanosis, clubbing or edema, positive pedal pulses  Neurological:  Oriented to person, place and time, No gross sensory or motor defects, visual fields intact to confrontation, normal deep tendon reflexes    Labs:                      12.8   11.65 )-----------( 258      ( 29 Dec 2022 07:30 )             42.0   12-29  143  |  105  |  26<H>  ----------------------------<  151<H>  3.7   |  31  |  0.76  Ca    9.0      29 Dec 2022 07:30  Phos  3.5     12-29  Mg     2.4     12-29    Capillary Blood glucose:  POCT Blood Glucose.: 131 mg/dL (29 Dec 2022 11:44)  POCT Blood Glucose.: 126 mg/dL (29 Dec 2022 07:49)  POCT Blood Glucose.: 165 mg/dL (28 Dec 2022 20:47)  POCT Blood Glucose.: 157 mg/dL (28 Dec 2022 16:48)          Assessment and Plan:  77 year old F with PMH of HTN, hypothyroidism, CHF, COPD, bipolar d/o, schizoaffective d/o, DM presents after fall. Pt reports she fell from her wheelchair. Admitted for sepsis and fall. Endocrinology is consulted for abnormal TFTs         ENDOCRINE INITIAL CONSULT NOTE:    77y old Female who presents with a chief complaint of Fall (29 Dec 2022 09:25)    HPI:  77 year old F with PMH of HTN, hypothyroidism, CHF, COPD, bipolar d/o, schizoaffective d/o, DM presents after fall. Pt reports she fell from her wheelchair. Admitted for sepsis and fall. Endocrinology is consulted for abnormal TFTs    Patient seen at the bedside alert, however not a good historian. Patient states she has hypothyroidism for >10 years. She takes synthroid daily in the morning, does not wait for 30 minutes to eat her breakfast. She does not remember the dose. She denies any thyroid surgery or thyroid cancer. She denies family hx of hypothyroidism    Review of systems:  Constitutional: No fever, no weight loss, no fatigue, no low energy, generalized weakness, poor appetite  Eyes: No redness, no dryness, no pain, no tearing, no gritty or gustavo feeling, no bulging  Cardiovascular/ Respiratory: No palpitations, no chest pain, no shortness of breath, no exercise intolerance, no cough, no leg/ ankle swelling  Gastrointestinal: No trouble swallowing, no heart burn, no abdominal pain, no bloating, no nausea, no vomiting, no constipation, no diarrhea, no frequent bowel movements  Neurological: No headaches, no change in vision, no dizziness/ lightheadedness, no tremors, +ve numbness/ tingling in feet, no pain/ burning in feet, no trouble with balance, yes muscular weakness, yes inability to walk    Past Medical and Surgical History:  HTN (hypertension)  Schizo affective schizophrenia  Bipolar 1 disorder  Hypothyroidism  Arthritis  Hypercholesteremia  Chronic obstructive pulmonary disease, unspecified COPD type  Chronic diastolic congestive heart failure  Interstitial lung disease    No significant past surgical history    Family History:  Denies family hx of hypothyroidism    Social History:  Lives with son  Tobacco: Denies  Alcohol: Denies  illicit drug abuse: Denies    Medications (Standing):  albuterol    90 MICROgram(s) HFA Inhaler 2 Puff(s) Inhalation every 6 hours  amLODIPine   Tablet 10 milliGRAM(s) Oral daily  artificial tears (preservative free) Ophthalmic Solution 1 Drop(s) Both EYES two times a day  aspirin  chewable 81 milliGRAM(s) Oral daily  budesonide 160 MICROgram(s)/formoterol 4.5 MICROgram(s) Inhaler 2 Puff(s) Inhalation two times a day  enoxaparin Injectable 40 milliGRAM(s) SubCutaneous every 24 hours  furosemide    Tablet 40 milliGRAM(s) Oral daily  insulin lispro (ADMELOG) corrective regimen sliding scale   SubCutaneous three times a day before meals  insulin lispro (ADMELOG) corrective regimen sliding scale   SubCutaneous at bedtime  lactulose Syrup 20 Gram(s) Oral two times a day  OLANZapine 10 milliGRAM(s) Oral daily  predniSONE   Tablet 20 milliGRAM(s) Oral daily    Medications (PRN):  acetaminophen     Tablet .. 650 milliGRAM(s) Oral every 6 hours PRN Temp greater or equal to 38C (100.4F), Mild Pain (1 - 3)  traMADol 25 milliGRAM(s) Oral every 6 hours PRN Moderate Pain (4 - 6)  traMADol 50 milliGRAM(s) Oral every 6 hours PRN Severe Pain (7 - 10)    Physical Examination  Vital Signs Last 24 Hrs  T(C): 36.1 (29 Dec 2022 05:00), Max: 36.1 (28 Dec 2022 20:33)  T(F): 97 (29 Dec 2022 05:00), Max: 97 (28 Dec 2022 20:33)  HR: 96 (29 Dec 2022 05:00) (88 - 96)  BP: 148/62 (29 Dec 2022 05:00) (127/62 - 148/62)  BP(mean): --  RR: 17 (29 Dec 2022 05:00) (17 - 18)  SpO2: 95% (29 Dec 2022 05:00) (95% - 97%)    Constitutional: No acute distress, yes ill- appearing, obese  HEENT: Moist mucous membranes  Neck:  No JVD, bruits or thyromegaly, No thyroid nodules palpable, no LAD  Respiratory:  Respiratory effort normal, lungs clear to ausculation, without rales or rhonchi  Cardiovascular:  Regular heart rate, normal S1 and S2 sounds, without murmur, rub or gallop.  Gastrointestinal: Soft, non tender without hepatosplenomegaly and masses, +ve abdominal obesity  Extremities: Sensation intact in feet, no cyanosis, yes b/l pedal edema, positive pedal pulses, DTR in legs wnl  Neurological:  Oriented to person, place and time,     Labs:                      12.8   11.65 )-----------( 258      ( 29 Dec 2022 07:30 )             42.0   12-29  143  |  105  |  26<H>  ----------------------------<  151<H>  3.7   |  31  |  0.76  Ca    9.0      29 Dec 2022 07:30  Phos  3.5     12-29  Mg     2.4     12-29    Capillary Blood glucose:  POCT Blood Glucose.: 131 mg/dL (29 Dec 2022 11:44)  POCT Blood Glucose.: 126 mg/dL (29 Dec 2022 07:49)  POCT Blood Glucose.: 165 mg/dL (28 Dec 2022 20:47)  POCT Blood Glucose.: 157 mg/dL (28 Dec 2022 16:48)    Thyroid Stimulating Hormone, Serum: 0.18 uU/mL (12.28.22 @ 08:55)  T4, Serum: 13.5 ug/dL (12.28.22 @ 08:55)    Thyroid Stimulating Hormone, Serum: 0.82 uU/mL (10.27.18 @ 06:54)    Assessment and Plan:  77 year old F with PMH of HTN, hypothyroidism, CHF, COPD, bipolar d/o, schizoaffective d/o, DM presents after fall. Pt reports she fell from her wheelchair. Admitted for sepsis and fall. Endocrinology is consulted for abnormal TFTs    1) Abnormal TSH  2) Hx of primary hypothyroidism    Patient has most likely Hashimoto's thyroiditis.   Per prescription hx patient was on 200mcg of LT4 ( Higher then her weight based dose)  TSH has been suppressed to 0.18 likely due to high dose of Levothyroxine. Patient denies being sick before coming to hospital, therefore its less likely that low TSH is due to non thyroidal illness  Per chart review, patient had normal TSH in 2018.  Weight based dose of levothyroxine is around 150mcg      Recommendations:  Decrease Levothyroxine to 150 mcg daily. Please give in morning, in an empty stomach and wait for 30 minutes to give breakfast  Follow up Free T4, Total T3  Check TPO antibody to rule out Hashimoto's disease  Counselled patient regarding the levothyroxine and appropriate administration technique   Check TFTs in one week if patient is stays in hospital otherwise outpatient      Discussed endocrine plan of care with patient and primary team      ENDOCRINE INITIAL CONSULT NOTE:    77y old Female who presents with a chief complaint of Fall (29 Dec 2022 09:25)    HPI:  77 year old F with PMH of HTN, hypothyroidism, CHF, COPD, bipolar d/o, schizoaffective d/o, DM presents after fall. Pt reports she fell from her wheelchair. Admitted for sepsis and fall. Endocrinology is consulted for abnormal TFTs    Patient seen at the bedside alert, however not a good historian. Patient states she has hypothyroidism for >10 years. She takes synthroid daily in the morning, does not wait for 30 minutes to eat her breakfast. She does not remember the dose. She denies any thyroid surgery or thyroid cancer. She denies family hx of hypothyroidism    Review of systems:  Constitutional: No fever, no weight loss, no fatigue, no low energy, generalized weakness, poor appetite  Eyes: No redness, no dryness, no pain, no tearing, no gritty or gustavo feeling, no bulging  Cardiovascular/ Respiratory: No palpitations, no chest pain, no shortness of breath, no exercise intolerance, no cough, no leg/ ankle swelling  Gastrointestinal: No trouble swallowing, no heart burn, no abdominal pain, no bloating, no nausea, no vomiting, no constipation, no diarrhea, no frequent bowel movements  Neurological: No headaches, no change in vision, no dizziness/ lightheadedness, no tremors, +ve numbness/ tingling in feet, no pain/ burning in feet, no trouble with balance, yes muscular weakness, yes inability to walk    Past Medical and Surgical History:  HTN (hypertension)  Schizo affective schizophrenia  Bipolar 1 disorder  Hypothyroidism  Arthritis  Hypercholesteremia  Chronic obstructive pulmonary disease, unspecified COPD type  Chronic diastolic congestive heart failure  Interstitial lung disease    No significant past surgical history    Family History:  Denies family hx of hypothyroidism    Social History:  Lives with son  Tobacco: Denies  Alcohol: Denies  illicit drug abuse: Denies    Medications (Standing):  albuterol    90 MICROgram(s) HFA Inhaler 2 Puff(s) Inhalation every 6 hours  amLODIPine   Tablet 10 milliGRAM(s) Oral daily  artificial tears (preservative free) Ophthalmic Solution 1 Drop(s) Both EYES two times a day  aspirin  chewable 81 milliGRAM(s) Oral daily  budesonide 160 MICROgram(s)/formoterol 4.5 MICROgram(s) Inhaler 2 Puff(s) Inhalation two times a day  enoxaparin Injectable 40 milliGRAM(s) SubCutaneous every 24 hours  furosemide    Tablet 40 milliGRAM(s) Oral daily  insulin lispro (ADMELOG) corrective regimen sliding scale   SubCutaneous three times a day before meals  insulin lispro (ADMELOG) corrective regimen sliding scale   SubCutaneous at bedtime  lactulose Syrup 20 Gram(s) Oral two times a day  OLANZapine 10 milliGRAM(s) Oral daily  predniSONE   Tablet 20 milliGRAM(s) Oral daily    Medications (PRN):  acetaminophen     Tablet .. 650 milliGRAM(s) Oral every 6 hours PRN Temp greater or equal to 38C (100.4F), Mild Pain (1 - 3)  traMADol 25 milliGRAM(s) Oral every 6 hours PRN Moderate Pain (4 - 6)  traMADol 50 milliGRAM(s) Oral every 6 hours PRN Severe Pain (7 - 10)    Physical Examination  Vital Signs Last 24 Hrs  T(C): 36.1 (29 Dec 2022 05:00), Max: 36.1 (28 Dec 2022 20:33)  T(F): 97 (29 Dec 2022 05:00), Max: 97 (28 Dec 2022 20:33)  HR: 96 (29 Dec 2022 05:00) (88 - 96)  BP: 148/62 (29 Dec 2022 05:00) (127/62 - 148/62)  BP(mean): --  RR: 17 (29 Dec 2022 05:00) (17 - 18)  SpO2: 95% (29 Dec 2022 05:00) (95% - 97%)    Constitutional: No acute distress, yes ill- appearing, obese  HEENT: Moist mucous membranes  Neck:  No JVD, bruits or thyromegaly, No thyroid nodules palpable, no LAD  Respiratory:  Respiratory effort normal, lungs clear to ausculation, without rales or rhonchi  Cardiovascular:  Regular heart rate, normal S1 and S2 sounds, without murmur, rub or gallop.  Gastrointestinal: Soft, non tender without hepatosplenomegaly and masses, +ve abdominal obesity  Extremities: Sensation intact in feet, no cyanosis, yes b/l pedal edema, positive pedal pulses, DTR in legs wnl  Neurological:  Oriented to person, place and time,     Labs:                      12.8   11.65 )-----------( 258      ( 29 Dec 2022 07:30 )             42.0   12-29  143  |  105  |  26<H>  ----------------------------<  151<H>  3.7   |  31  |  0.76  Ca    9.0      29 Dec 2022 07:30  Phos  3.5     12-29  Mg     2.4     12-29    Capillary Blood glucose:  POCT Blood Glucose.: 131 mg/dL (29 Dec 2022 11:44)  POCT Blood Glucose.: 126 mg/dL (29 Dec 2022 07:49)  POCT Blood Glucose.: 165 mg/dL (28 Dec 2022 20:47)  POCT Blood Glucose.: 157 mg/dL (28 Dec 2022 16:48)    Thyroid Stimulating Hormone, Serum: 0.18 uU/mL (12.28.22 @ 08:55)  T4, Serum: 13.5 ug/dL (12.28.22 @ 08:55)    Thyroid Stimulating Hormone, Serum: 0.82 uU/mL (10.27.18 @ 06:54)    Assessment and Plan:  77 year old F with PMH of HTN, hypothyroidism, CHF, COPD, bipolar d/o, schizoaffective d/o, DM presents after fall. Pt reports she fell from her wheelchair. Admitted for sepsis and fall. Endocrinology is consulted for abnormal TFTs    1) Abnormal TSH  2) Hx of primary hypothyroidism    Patient has most likely Hashimoto's thyroiditis.   Per prescription hx patient was on 200mcg of LT4 ( Higher then her weight based dose)  TSH has been suppressed to 0.18 on 28th 2022  Patient has received the prednisone on 27th Dec, 2022, that can falsely lower the TSH value. However as the Total T4 is high and home levothyroxine dose is higher than her weight based dose, therefore will reduce the levothyroxine dose., Patient denies being sick before coming to hospital, therefore its less likely that low TSH is due to non thyroidal illness  Per chart review, patient had normal TSH in 2018.  Weight based dose of levothyroxine is around 150mcg    Recommendations:  Decrease Levothyroxine to 150 mcg daily. Please give in morning, in an empty stomach and wait for 30 minutes to give breakfast  Follow up Free T4, Total T3  Check TPO antibody to rule out Hashimoto's disease  Counselled patient regarding the levothyroxine and appropriate administration technique   Check TFTs in one week ( once the steroids are discontinued)  inpatient or outpatient      Discussed endocrine plan of care with patient and primary team      ENDOCRINE INITIAL CONSULT NOTE:    77y old Female who presents with a chief complaint of Fall (29 Dec 2022 09:25)    HPI:  77 year old F with PMH of HTN, hypothyroidism, CHF, COPD, bipolar d/o, schizoaffective d/o, DM presents after fall. Pt reports she fell from her wheelchair. Admitted for sepsis and fall. Endocrinology is consulted for abnormal TFTs    Patient seen at the bedside alert, however not a good historian. Patient states she has hypothyroidism for >10 years. She takes synthroid daily in the morning, does not wait for 30 minutes to eat her breakfast. She does not remember the dose. She denies any thyroid surgery or thyroid cancer. She denies family hx of hypothyroidism    Review of systems:  Constitutional: No fever, no weight loss, no fatigue, no low energy, generalized weakness, poor appetite  Eyes: No redness, no dryness, no pain, no tearing, no gritty or gustavo feeling, no bulging  Cardiovascular/ Respiratory: No palpitations, no chest pain, no shortness of breath, no exercise intolerance, no cough, no leg/ ankle swelling  Gastrointestinal: No trouble swallowing, no heart burn, no abdominal pain, no bloating, no nausea, no vomiting, no constipation, no diarrhea, no frequent bowel movements  Neurological: No headaches, no change in vision, no dizziness/ lightheadedness, no tremors, +ve numbness/ tingling in feet, no pain/ burning in feet, no trouble with balance, yes muscular weakness, yes inability to walk    Past Medical and Surgical History:  HTN (hypertension)  Schizo affective schizophrenia  Bipolar 1 disorder  Hypothyroidism  Arthritis  Hypercholesteremia  Chronic obstructive pulmonary disease, unspecified COPD type  Chronic diastolic congestive heart failure  Interstitial lung disease    No significant past surgical history    Family History:  Denies family hx of hypothyroidism    Social History:  Lives with son  Tobacco: Denies  Alcohol: Denies  illicit drug abuse: Denies    Medications (Standing):  albuterol    90 MICROgram(s) HFA Inhaler 2 Puff(s) Inhalation every 6 hours  amLODIPine   Tablet 10 milliGRAM(s) Oral daily  artificial tears (preservative free) Ophthalmic Solution 1 Drop(s) Both EYES two times a day  aspirin  chewable 81 milliGRAM(s) Oral daily  budesonide 160 MICROgram(s)/formoterol 4.5 MICROgram(s) Inhaler 2 Puff(s) Inhalation two times a day  enoxaparin Injectable 40 milliGRAM(s) SubCutaneous every 24 hours  furosemide    Tablet 40 milliGRAM(s) Oral daily  insulin lispro (ADMELOG) corrective regimen sliding scale   SubCutaneous three times a day before meals  insulin lispro (ADMELOG) corrective regimen sliding scale   SubCutaneous at bedtime  lactulose Syrup 20 Gram(s) Oral two times a day  OLANZapine 10 milliGRAM(s) Oral daily  predniSONE   Tablet 20 milliGRAM(s) Oral daily    Medications (PRN):  acetaminophen     Tablet .. 650 milliGRAM(s) Oral every 6 hours PRN Temp greater or equal to 38C (100.4F), Mild Pain (1 - 3)  traMADol 25 milliGRAM(s) Oral every 6 hours PRN Moderate Pain (4 - 6)  traMADol 50 milliGRAM(s) Oral every 6 hours PRN Severe Pain (7 - 10)    Physical Examination  Vital Signs Last 24 Hrs  T(C): 36.1 (29 Dec 2022 05:00), Max: 36.1 (28 Dec 2022 20:33)  T(F): 97 (29 Dec 2022 05:00), Max: 97 (28 Dec 2022 20:33)  HR: 96 (29 Dec 2022 05:00) (88 - 96)  BP: 148/62 (29 Dec 2022 05:00) (127/62 - 148/62)  BP(mean): --  RR: 17 (29 Dec 2022 05:00) (17 - 18)  SpO2: 95% (29 Dec 2022 05:00) (95% - 97%)    Constitutional: No acute distress, yes ill- appearing, obese  HEENT: Moist mucous membranes  Neck:  No JVD, bruits or thyromegaly, No thyroid nodules palpable, no LAD  Respiratory:  Respiratory effort normal, lungs clear to ausculation, without rales or rhonchi  Cardiovascular:  Regular heart rate, normal S1 and S2 sounds, without murmur, rub or gallop.  Gastrointestinal: Soft, non tender without hepatosplenomegaly and masses, +ve abdominal obesity  Extremities: Sensation intact in feet, no cyanosis, yes b/l pedal edema, positive pedal pulses, DTR in legs wnl  Neurological:  Oriented to person, place and time,     Labs:                      12.8   11.65 )-----------( 258      ( 29 Dec 2022 07:30 )             42.0   12-29  143  |  105  |  26<H>  ----------------------------<  151<H>  3.7   |  31  |  0.76  Ca    9.0      29 Dec 2022 07:30  Phos  3.5     12-29  Mg     2.4     12-29    Capillary Blood glucose:  POCT Blood Glucose.: 131 mg/dL (29 Dec 2022 11:44)  POCT Blood Glucose.: 126 mg/dL (29 Dec 2022 07:49)  POCT Blood Glucose.: 165 mg/dL (28 Dec 2022 20:47)  POCT Blood Glucose.: 157 mg/dL (28 Dec 2022 16:48)    Thyroid Stimulating Hormone, Serum: 0.18 uU/mL (12.28.22 @ 08:55)  T4, Serum: 13.5 ug/dL (12.28.22 @ 08:55)    Thyroid Stimulating Hormone, Serum: 0.82 uU/mL (10.27.18 @ 06:54)    Assessment and Plan:  77 year old F with PMH of HTN, hypothyroidism, CHF, COPD, bipolar d/o, schizoaffective d/o, DM presents after fall. Pt reports she fell from her wheelchair. Admitted for sepsis and fall. Endocrinology is consulted for abnormal TFTs    1) Abnormal TSH  2) Hx of primary hypothyroidism    Patient has most likely Hashimoto's thyroiditis.   Per prescription hx patient was on 200mcg of LT4 ( Higher then her weight based dose)  TSH has been suppressed to 0.18 on 28th Dec 2022  Patient has received the prednisone on 27th Dec, 2022, that can falsely lower the TSH value. However as the Total T4 is high and home levothyroxine dose is higher than her weight based dose, therefore will reduce the levothyroxine dose., Patient denies being sick before coming to hospital, therefore its less likely that low TSH is due to non thyroidal illness  Per chart review, patient had normal TSH in 2018.  Weight based dose of levothyroxine is around 150mcg    Recommendations:  Decrease Levothyroxine to 150 mcg daily. Please give in morning, in an empty stomach and wait for 30 minutes to give breakfast  Follow up Free T4, Total T3  Check TPO antibody to rule out Hashimoto's disease  Counselled patient regarding the levothyroxine and appropriate administration technique   Check TFTs in one week ( once the steroids are discontinued)  inpatient or outpatient      Discussed endocrine plan of care with patient and primary team

## 2022-12-30 LAB
ALBUMIN SERPL ELPH-MCNC: 2.5 G/DL — LOW (ref 3.5–5)
ALP SERPL-CCNC: 69 U/L — SIGNIFICANT CHANGE UP (ref 40–120)
ALT FLD-CCNC: 33 U/L DA — SIGNIFICANT CHANGE UP (ref 10–60)
ANION GAP SERPL CALC-SCNC: 5 MMOL/L — SIGNIFICANT CHANGE UP (ref 5–17)
AST SERPL-CCNC: 11 U/L — SIGNIFICANT CHANGE UP (ref 10–40)
BILIRUB SERPL-MCNC: 0.3 MG/DL — SIGNIFICANT CHANGE UP (ref 0.2–1.2)
BUN SERPL-MCNC: 21 MG/DL — HIGH (ref 7–18)
CALCIUM SERPL-MCNC: 8.9 MG/DL — SIGNIFICANT CHANGE UP (ref 8.4–10.5)
CHLORIDE SERPL-SCNC: 103 MMOL/L — SIGNIFICANT CHANGE UP (ref 96–108)
CO2 SERPL-SCNC: 34 MMOL/L — HIGH (ref 22–31)
CREAT SERPL-MCNC: 0.63 MG/DL — SIGNIFICANT CHANGE UP (ref 0.5–1.3)
EGFR: 92 ML/MIN/1.73M2 — SIGNIFICANT CHANGE UP
GLUCOSE BLDC GLUCOMTR-MCNC: 140 MG/DL — HIGH (ref 70–99)
GLUCOSE BLDC GLUCOMTR-MCNC: 156 MG/DL — HIGH (ref 70–99)
GLUCOSE BLDC GLUCOMTR-MCNC: 159 MG/DL — HIGH (ref 70–99)
GLUCOSE BLDC GLUCOMTR-MCNC: 195 MG/DL — HIGH (ref 70–99)
GLUCOSE SERPL-MCNC: 166 MG/DL — HIGH (ref 70–99)
HCT VFR BLD CALC: 41.1 % — SIGNIFICANT CHANGE UP (ref 34.5–45)
HGB BLD-MCNC: 12.6 G/DL — SIGNIFICANT CHANGE UP (ref 11.5–15.5)
MAGNESIUM SERPL-MCNC: 2.3 MG/DL — SIGNIFICANT CHANGE UP (ref 1.6–2.6)
MCHC RBC-ENTMCNC: 25.8 PG — LOW (ref 27–34)
MCHC RBC-ENTMCNC: 30.7 GM/DL — LOW (ref 32–36)
MCV RBC AUTO: 84 FL — SIGNIFICANT CHANGE UP (ref 80–100)
NRBC # BLD: 0 /100 WBCS — SIGNIFICANT CHANGE UP (ref 0–0)
PHOSPHATE SERPL-MCNC: 3.7 MG/DL — SIGNIFICANT CHANGE UP (ref 2.5–4.5)
PLATELET # BLD AUTO: 286 K/UL — SIGNIFICANT CHANGE UP (ref 150–400)
POTASSIUM SERPL-MCNC: 3.7 MMOL/L — SIGNIFICANT CHANGE UP (ref 3.5–5.3)
POTASSIUM SERPL-SCNC: 3.7 MMOL/L — SIGNIFICANT CHANGE UP (ref 3.5–5.3)
PROT SERPL-MCNC: 6.5 G/DL — SIGNIFICANT CHANGE UP (ref 6–8.3)
RBC # BLD: 4.89 M/UL — SIGNIFICANT CHANGE UP (ref 3.8–5.2)
RBC # FLD: 17.9 % — HIGH (ref 10.3–14.5)
SARS-COV-2 RNA SPEC QL NAA+PROBE: SIGNIFICANT CHANGE UP
SODIUM SERPL-SCNC: 142 MMOL/L — SIGNIFICANT CHANGE UP (ref 135–145)
THYROPEROXIDASE AB SERPL-ACNC: 32.5 IU/ML — SIGNIFICANT CHANGE UP
WBC # BLD: 14.22 K/UL — HIGH (ref 3.8–10.5)
WBC # FLD AUTO: 14.22 K/UL — HIGH (ref 3.8–10.5)

## 2022-12-30 PROCEDURE — 99232 SBSQ HOSP IP/OBS MODERATE 35: CPT | Mod: FS

## 2022-12-30 RX ORDER — LEVOTHYROXINE SODIUM 125 MCG
1 TABLET ORAL
Qty: 0 | Refills: 0 | DISCHARGE
Start: 2022-12-30

## 2022-12-30 RX ADMIN — ALBUTEROL 2 PUFF(S): 90 AEROSOL, METERED ORAL at 14:23

## 2022-12-30 RX ADMIN — OLANZAPINE 10 MILLIGRAM(S): 15 TABLET, FILM COATED ORAL at 11:56

## 2022-12-30 RX ADMIN — ALBUTEROL 2 PUFF(S): 90 AEROSOL, METERED ORAL at 22:01

## 2022-12-30 RX ADMIN — Medication 1 DROP(S): at 17:27

## 2022-12-30 RX ADMIN — ALBUTEROL 2 PUFF(S): 90 AEROSOL, METERED ORAL at 03:28

## 2022-12-30 RX ADMIN — BUDESONIDE AND FORMOTEROL FUMARATE DIHYDRATE 2 PUFF(S): 160; 4.5 AEROSOL RESPIRATORY (INHALATION) at 22:01

## 2022-12-30 RX ADMIN — ALBUTEROL 2 PUFF(S): 90 AEROSOL, METERED ORAL at 08:03

## 2022-12-30 RX ADMIN — Medication 20 MILLIGRAM(S): at 06:46

## 2022-12-30 RX ADMIN — Medication 1 DROP(S): at 05:56

## 2022-12-30 RX ADMIN — Medication 150 MICROGRAM(S): at 06:46

## 2022-12-30 RX ADMIN — Medication 1: at 08:03

## 2022-12-30 RX ADMIN — Medication 81 MILLIGRAM(S): at 11:55

## 2022-12-30 RX ADMIN — Medication 1: at 16:57

## 2022-12-30 RX ADMIN — Medication 650 MILLIGRAM(S): at 14:35

## 2022-12-30 RX ADMIN — AMLODIPINE BESYLATE 10 MILLIGRAM(S): 2.5 TABLET ORAL at 06:45

## 2022-12-30 RX ADMIN — Medication 650 MILLIGRAM(S): at 14:12

## 2022-12-30 RX ADMIN — BUDESONIDE AND FORMOTEROL FUMARATE DIHYDRATE 2 PUFF(S): 160; 4.5 AEROSOL RESPIRATORY (INHALATION) at 11:56

## 2022-12-30 RX ADMIN — Medication 40 MILLIGRAM(S): at 06:46

## 2022-12-30 RX ADMIN — ENOXAPARIN SODIUM 40 MILLIGRAM(S): 100 INJECTION SUBCUTANEOUS at 06:45

## 2022-12-30 NOTE — PROGRESS NOTE ADULT - NUTRITIONAL ASSESSMENT
--- End of Report ---    < end of copied text >    Consultant(s) Notes Reviewed:   YES    Care Discussed with Consultants : Yes    Plan of care was discussed with patient and /or primary care giver; all questions and concerns were addressed and care was aligned with patient's wishes.  	  77 year old F with PMH of HTN, hypothyroidism, CHF, COPD, bipolar d/o, schizoaffective d/o, DM presents after fall. Pt reports she fell from her wheelchair, denies LOC but reports head trauma and left lower leg trauma and is now pain free. Denies pain, fever, chills, shortness of breath, cough, wheezing, chest pain, lightheadedness, dizziness, n/v/d, changes in urinary or bowel habits.  ED Course:  Vitals: /69 P 120 R 18 T 98.2F > 102.2F  SpO2 97% RA  Meds: Unasyn, solumedrol 125 mg IV, Duonebs x 2  Patient is admitted to  for  COPD exacerbation and s/p fall at nursing home. CXR shows mild patchy opacification the lungs appears compatible with mild pulmonary edema.  CTH shows +Hematome forehead. No bleeding or midline shift noted.  Pt is unable to titrate oxygen off this time-86% on room air. Placed on 2L NC.   12/26 Medically stable, downgraded to medicine under dr. Leavitt service (covering  MD dr. Cordova today)  PT consulted, IV solumedrol changed to po. D/C'd Unasyn.      Problem/Plan - 1:  ·  Problem: Sepsis, unspecified organism.   ·  Plan: Admitted with fever 102.2  Given vanco and Unasyn in ED.  CXR shows There is mild patchy opacification the lungs appears compatible with mild pulmonary edema.     Serum lactate normal.  blood cultures and urine culture-NGTD  d/c'd Unasyn  continue to monitor off Abiotics.     Problem/Plan - 2:  ·  Problem: Fall.   ·  Plan: S/P mechanical fall at NH  +trauma/hematoma to forehead. Denying any pain.  CT +Hematome forehead. No bleeding or midline shift noted on CT scan.  Monitor size of hematoma.  C/O left hip pain. Left hip xray No acute fractures noted +Chronic hip fractures (compared to previous)  Tylenol only for pain.  Maintain fall and safety measures.  PT recs [facility].     Problem/Plan - 3:  ·  Problem: COPD exacerbation.   ·  Plan: GOLD 4C  Uses Breo and albuterol at facility.  Does not use oxygen or BIPAP  Presented with significant wheezing in ED  CXR shows mild patchy opacification the lungs appears compatible with mild pulmonary edema.     Continue bronchodilators and ICS  Continue oxygen support. Currently tolerating 2LPM. Monitor oxygen saturation. Titrate oxygen as tolerated.  Prednisone taper.     Problem/Plan - 4:  ·  Problem: Chronic CHF.   ·  Plan: Not acute exacerbation  Continue amlodipine and lasix.  c/w Statin, ASA,     Problem/Plan - 5:  ·  Problem: Diabetes.   ·  Plan: Continue sliding scale coverage.  Monitor glucose.     Problem/Plan - 6:  ·  Problem: Hypertension.   ·  Plan: Continue amlodipine and lasix.  Statin, ASA,  DASH carb consistent diet.

## 2022-12-30 NOTE — PROGRESS NOTE ADULT - PROBLEM SELECTOR PLAN 2
S/P mechanical fall at NH  +trauma/hematoma to forehead. Denying any pain.  CT +Hematome forehead. No bleeding or midline shift noted on CT scan.  Monitor size of hematoma.  C/O left hip pain. Left hip xray No acute fractures noted +Chronic hip fractures (compared to previous)  Tylenol only for pain.  Maintain fall and safety measures.  PT recs [facility].

## 2022-12-30 NOTE — PROGRESS NOTE ADULT - SUBJECTIVE AND OBJECTIVE BOX
PATIENT SEEN AND EXAMINED ON :- 12/30/22  DATE OF SERVICE:     12/30/22        Interim events noted,Labs ,Radiological studies and Cardiology tests reviewed .       HOSPITAL COURSE: HPI:  77 year old F with PMH of HTN, hypothyroidism, CHF, COPD, bipolar d/o, schizoaffective d/o, DM presents after fall. Pt reports she fell from her wheelchair, denies LOC but reports head trauma and left lower leg trauma and is now pain free. Denies pain, fever, chills, shortness of breath, cough, wheezing, chest pain, lightheadedness, dizziness, n/v/d, changes in urinary or bowel habits.    ED Course:  Vitals: /69 P 120 R 18 T 98.2F > 102.2F  SpO2 97% RA  Meds: unasyn, solumedrol 125 mg IV, duonebs x 2 (24 Dec 2022 03:22)      INTERIM EVENTS:Patient seen at bedside ,interim events noted.      PMH -reviewed admission note, no change since admission  HEART FAILURE: Acute[ ]Chronic[ ] Systolic[ ] Diastolic[ ] Combined Systolic and Diastolic[ ]  CAD[ ] CABG[ ] PCI[ ]  DEVICES[ ] PPM[ ] ICD[ ] ILR[ ]  ATRIAL FIBRILLATION[ ] Paroxysmal[ ] Permanent[ ] CHADS2-[  ]  JAD[ ] CKD1[ ] CKD2[ ] CKD3[ ] CKD4[ ] ESRD[ ]  COPD[ ] HTN[ ]   DM[ ] Type1[ ] Type 2[ ]   CVA[ ] Paresis[ ]    AMBULATION: Assisted[ ] Cane/walker[ ] Independent[ ]    MEDICATIONS  (STANDING):  albuterol    90 MICROgram(s) HFA Inhaler 2 Puff(s) Inhalation every 6 hours  amLODIPine   Tablet 10 milliGRAM(s) Oral daily  artificial tears (preservative free) Ophthalmic Solution 1 Drop(s) Both EYES two times a day  aspirin  chewable 81 milliGRAM(s) Oral daily  budesonide 160 MICROgram(s)/formoterol 4.5 MICROgram(s) Inhaler 2 Puff(s) Inhalation two times a day  enoxaparin Injectable 40 milliGRAM(s) SubCutaneous every 24 hours  furosemide    Tablet 40 milliGRAM(s) Oral daily  insulin lispro (ADMELOG) corrective regimen sliding scale   SubCutaneous three times a day before meals  insulin lispro (ADMELOG) corrective regimen sliding scale   SubCutaneous at bedtime  lactulose Syrup 20 Gram(s) Oral two times a day  levothyroxine 150 MICROGram(s) Oral daily  OLANZapine 10 milliGRAM(s) Oral daily  predniSONE   Tablet 20 milliGRAM(s) Oral daily    MEDICATIONS  (PRN):  acetaminophen     Tablet .. 650 milliGRAM(s) Oral every 6 hours PRN Temp greater or equal to 38C (100.4F), Mild Pain (1 - 3)  traMADol 25 milliGRAM(s) Oral every 6 hours PRN Moderate Pain (4 - 6)  traMADol 50 milliGRAM(s) Oral every 6 hours PRN Severe Pain (7 - 10)            REVIEW OF SYSTEMS:  Constitutional: [ ] fever, [ ]weight loss,  [ ]fatigue [ ]weight gain  Eyes: [ ] visual changes  Respiratory: [ ]shortness of breath;  [ ] cough, [ ]wheezing, [ ]chills, [ ]hemoptysis  Cardiovascular: [ ] chest pain, [ ]palpitations, [ ]dizziness,  [ ]leg swelling[ ]orthopnea[ ]PND  Gastrointestinal: [ ] abdominal pain, [ ]nausea, [ ]vomiting,  [ ]diarrhea [ ]Constipation [ ]Melena  Genitourinary: [ ] dysuria, [ ] hematuria [ ]Howard  Neurologic: [ ] headaches [ ] tremors[ ]weakness [ ]Paralysis Right[ ] Left[ ]  Skin: [ ] itching, [ ]burning, [ ] rashes  Endocrine: [ ] heat or cold intolerance  Musculoskeletal: [ ] joint pain or swelling; [ ] muscle, back, or extremity pain  Psychiatric: [ ] depression, [ ]anxiety, [ ]mood swings, or [ ]difficulty sleeping  Hematologic: [ ] easy bruising, [ ] bleeding gums    [ ] All remaining systems negative except as per above.   [ ]Unable to obtain.  [x] No change in ROS since admission      Vital Signs Last 24 Hrs  T(C): 36.8 (30 Dec 2022 20:31), Max: 36.8 (30 Dec 2022 20:31)  T(F): 98.2 (30 Dec 2022 20:31), Max: 98.2 (30 Dec 2022 20:31)  HR: 92 (30 Dec 2022 20:31) (87 - 92)  BP: 134/63 (30 Dec 2022 20:31) (131/66 - 134/63)  BP(mean): --  RR: 19 (30 Dec 2022 20:31) (19 - 19)  SpO2: 95% (30 Dec 2022 20:31) (93% - 97%)    Parameters below as of 30 Dec 2022 20:31  Patient On (Oxygen Delivery Method): nasal cannula  O2 Flow (L/min): 2    I&O's Summary      PHYSICAL EXAM:  General: No acute distress BMI-  HEENT: EOMI, PERRL  Neck: Supple, [ ] JVD  Lungs: Equal air entry bilaterally; [ ] rales [ ] wheezing [ ] rhonchi  Heart: Regular rate and rhythm; [x ] murmur   2/6 [ x] systolic [ ] diastolic [ ] radiation[ ] rubs [ ]  gallops  Abdomen: Nontender, bowel sounds present  Extremities: No clubbing, cyanosis, [ ] edema [ ]Pulses  equal and intact  Nervous system:  Alert & Oriented X3, no focal deficits  Psychiatric: Normal affect  Skin: No rashes or lesions    LABS:  12-30    142  |  103  |  21<H>  ----------------------------<  166<H>  3.7   |  34<H>  |  0.63    Ca    8.9      30 Dec 2022 07:52  Phos  3.7     12-30  Mg     2.3     12-30    TPro  6.5  /  Alb  2.5<L>  /  TBili  0.3  /  DBili  x   /  AST  11  /  ALT  33  /  AlkPhos  69  12-30    Creatinine Trend: 0.63<--, 0.76<--, 0.74<--, 0.63<--, 0.66<--, 0.72<--                        12.6   14.22 )-----------( 286      ( 30 Dec 2022 07:52 )             41.1

## 2022-12-30 NOTE — PROGRESS NOTE ADULT - ASSESSMENT
77 year old F with PMH of HTN, hypothyroidism, CHF, COPD, bipolar d/o, schizoaffective d/o, DM presents after fall. Pt reports she fell from her wheelchair, denies LOC but reports head trauma and left lower leg trauma and is now pain free. Denies pain, fever, chills, shortness of breath, cough, wheezing, chest pain, lightheadedness, dizziness, n/v/d, changes in urinary or bowel habits.  ED Course:  Vitals: /69 P 120 R 18 T 98.2F > 102.2F  SpO2 97% RA  Meds: Unasyn, solumedrol 125 mg IV, Duonebs x 2  Patient is admitted to  for  COPD exacerbation and s/p fall at nursing home. CXR shows mild patchy opacification the lungs appears compatible with mild pulmonary edema.  CTH shows +Hematome forehead. No bleeding or midline shift noted.  Pt is unable to titrate oxygen off this time-86% on room air. Placed on 2L NC.   12/26 Medically stable, downgraded to medicine under dr. Leavitt service (covering  MD dr. Cordova today)  PT consulted, IV solumedrol changed to po. D/C'd Unasyn.   Family refuses pt back to [facility],  to follow up to facilitate different placement.   12/27- Pt remained stable.  Labs reviewed for past few days, CXR w/ pulm edema and responding well to PO lasix.  Na is slightly elevated, would not start treatment for now in setting of pulm edema since no changes in mentation.  Hence, f/u AM BMP.  Cards following, pending TTE.  PT recs [facility].

## 2022-12-30 NOTE — PROGRESS NOTE ADULT - PROBLEM SELECTOR PLAN 9
DVT and GI prophylaxis.  Continue oxygen support. Titrate as tolerated.  Prednisone taper.  PT rec [facility].  Patient refuses to back to [facility]  Requesting [facility]. Pending acceptance.  Medically stabilize for discharge to [facility].

## 2022-12-30 NOTE — PROGRESS NOTE ADULT - ATTENDING COMMENTS
77 year old F with PMH of HTN, hypothyroidism, CHF, COPD, bipolar d/o, schizoaffective d/o, DM presents after fall from her wheelchair. Admitted for Sepsis 2/2 cellulitis?, copd exacerbation and fall. She was initially admitted to ICU and later down graded to [facility]     Patient was seen and examined, feeling well. No new complaints     Labs reviewed     PE as above     A/P:  #COPD exacerbation   #Sepsis - resolved   #Pulmonary edema 2/2 diastolic dysfunction   #Hypothyroidism- now w/ low TSH   #S/p mechanical fall   #Hematoma of face/ scalp     Plan:  -Patient is over all improving, c/w prednisone for total 5 days, Symbicort. wean off O2 as tolerated   -ECHO noted w. normal EF, grade 1 DD. C/w Lasix 40 home dose. Appreciate cardio recs   -TSH low, free T3 slightly elevated.  C/w levothyroxine 150mcg -lower dose. Check TPO Ab (testing) . Appreciate endo recs   -lab holiday   -DC planning to [facility], pending acceptance.

## 2022-12-30 NOTE — PROGRESS NOTE ADULT - SUBJECTIVE AND OBJECTIVE BOX
NP Note discussed with  Primary Attending    Patient is a 77y old  Female who presents with a chief complaint of Fall (29 Dec 2022 13:57)      INTERVAL HPI/OVERNIGHT EVENTS: no new complaints    MEDICATIONS  (STANDING):  albuterol    90 MICROgram(s) HFA Inhaler 2 Puff(s) Inhalation every 6 hours  amLODIPine   Tablet 10 milliGRAM(s) Oral daily  artificial tears (preservative free) Ophthalmic Solution 1 Drop(s) Both EYES two times a day  aspirin  chewable 81 milliGRAM(s) Oral daily  budesonide 160 MICROgram(s)/formoterol 4.5 MICROgram(s) Inhaler 2 Puff(s) Inhalation two times a day  enoxaparin Injectable 40 milliGRAM(s) SubCutaneous every 24 hours  furosemide    Tablet 40 milliGRAM(s) Oral daily  insulin lispro (ADMELOG) corrective regimen sliding scale   SubCutaneous three times a day before meals  insulin lispro (ADMELOG) corrective regimen sliding scale   SubCutaneous at bedtime  lactulose Syrup 20 Gram(s) Oral two times a day  levothyroxine 150 MICROGram(s) Oral daily  OLANZapine 10 milliGRAM(s) Oral daily  predniSONE   Tablet 20 milliGRAM(s) Oral daily    MEDICATIONS  (PRN):  acetaminophen     Tablet .. 650 milliGRAM(s) Oral every 6 hours PRN Temp greater or equal to 38C (100.4F), Mild Pain (1 - 3)  traMADol 25 milliGRAM(s) Oral every 6 hours PRN Moderate Pain (4 - 6)  traMADol 50 milliGRAM(s) Oral every 6 hours PRN Severe Pain (7 - 10)      __________________________________________________  REVIEW OF SYSTEMS:    CONSTITUTIONAL: No fever,   EYES: no acute visual disturbances  NECK: No pain or stiffness  RESPIRATORY: No cough; No shortness of breath  CARDIOVASCULAR: No chest pain, no palpitations  GASTROINTESTINAL: No pain. No nausea or vomiting; No diarrhea   NEUROLOGICAL: No headache or numbness, no tremors  MUSCULOSKELETAL: Intermittent left hip pain.  GENITOURINARY: no dysuria, no frequency, no hesitancy  PSYCHIATRY: no depression , no anxiety  ALL OTHER  ROS negative        Vital Signs Last 24 Hrs  T(C): 36.4 (30 Dec 2022 05:02), Max: 36.6 (29 Dec 2022 13:59)  T(F): 97.6 (30 Dec 2022 05:02), Max: 97.8 (29 Dec 2022 13:59)  HR: 91 (30 Dec 2022 05:02) (91 - 99)  BP: 132/69 (30 Dec 2022 05:02) (126/67 - 137/63)  BP(mean): --  RR: 19 (30 Dec 2022 05:02) (17 - 19)  SpO2: 96% (30 Dec 2022 05:02) (90% - 96%)    Parameters below as of 30 Dec 2022 05:02  Patient On (Oxygen Delivery Method): nasal cannula  O2 Flow (L/min): 2      ________________________________________________  PHYSICAL EXAM:  GENERAL: NAD  HEENT: left periorbital ecchymosis. Normocephalic;   conjunctivae and sclerae clear; moist mucous membranes;   NECK : supple  CHEST/LUNG: Diminished breath sounds bilateral bases.  HEART: S1 S2  regular; no murmurs, gallops or rubs  ABDOMEN: Soft, Obese,  Nontender, Nondistended; Bowel sounds present  EXTREMITIES: no cyanosis; no edema; no calf tenderness  SKIN: Bilateral lower extremities with chronic venous stasis.  NERVOUS SYSTEM:  Awake and alert; Oriented  to place, person and time ; no new deficits    _________________________________________________  LABS:                        12.6   14.22 )-----------( 286      ( 30 Dec 2022 07:52 )             41.1     12-30    142  |  103  |  21<H>  ----------------------------<  166<H>  3.7   |  34<H>  |  0.63    Ca    8.9      30 Dec 2022 07:52  Phos  3.7     12-30  Mg     2.3     12-30    TPro  6.5  /  Alb  2.5<L>  /  TBili  0.3  /  DBili  x   /  AST  11  /  ALT  33  /  AlkPhos  69  12-30        CAPILLARY BLOOD GLUCOSE      POCT Blood Glucose.: 140 mg/dL (30 Dec 2022 11:26)  POCT Blood Glucose.: 156 mg/dL (30 Dec 2022 07:57)  POCT Blood Glucose.: 176 mg/dL (29 Dec 2022 21:39)  POCT Blood Glucose.: 118 mg/dL (29 Dec 2022 16:54)  POCT Blood Glucose.: 131 mg/dL (29 Dec 2022 11:44)        RADIOLOGY & ADDITIONAL TESTS:    Imaging Personally Reviewed:  YES  < from: Xray Chest 1 View-PORTABLE IMMEDIATE (12.23.22 @ 18:52) >  IMPRESSION: There is mild patchy opacification the lungs appears   compatible with mild pulmonary edema. The heart is normal in size. There   are severe degenerative changes of the shoulders bilaterally.    --- End of Report ---      < end of copied text >  < from: CT Head No Cont (12.23.22 @ 21:19) >  FINDINGS: No intracranial hemorrhage is seen. The grey-white   differentiation is maintained. Mild periventricular and subcortical white   matter hypoattenuation without mass effect is noted, non-specific, but   likely related to chronic small vessel ischemic changes.    Cerebral volume loss is present with proportional prominence of the sulci   and ventricles. No mass effect or midline shift is seen. Basal cisterns   are not effaced.    The visualized paranasal sinuses and tympanomastoid cavities are clear.   Evidence of prior bilateral ocular lens surgery.    Left frontotemporal scalp hematoma. No displaced calvarial fracture.    IMPRESSION: No intracranial hemorrhage or mass effect. Left   frontotemporal scalp hematoma.    --- End of Report ---    < end of copied text >    Consultant(s) Notes Reviewed:   YES    Care Discussed with Consultants : Yes    Plan of care was discussed with patient and /or primary care giver; all questions and concerns were addressed and care was aligned with patient's wishes.

## 2022-12-31 LAB
GLUCOSE BLDC GLUCOMTR-MCNC: 165 MG/DL — HIGH (ref 70–99)
GLUCOSE BLDC GLUCOMTR-MCNC: 167 MG/DL — HIGH (ref 70–99)
GLUCOSE BLDC GLUCOMTR-MCNC: 180 MG/DL — HIGH (ref 70–99)
GLUCOSE BLDC GLUCOMTR-MCNC: 187 MG/DL — HIGH (ref 70–99)

## 2022-12-31 PROCEDURE — 99232 SBSQ HOSP IP/OBS MODERATE 35: CPT

## 2022-12-31 RX ADMIN — ALBUTEROL 2 PUFF(S): 90 AEROSOL, METERED ORAL at 03:36

## 2022-12-31 RX ADMIN — ENOXAPARIN SODIUM 40 MILLIGRAM(S): 100 INJECTION SUBCUTANEOUS at 06:15

## 2022-12-31 RX ADMIN — Medication 81 MILLIGRAM(S): at 12:09

## 2022-12-31 RX ADMIN — Medication 20 MILLIGRAM(S): at 06:16

## 2022-12-31 RX ADMIN — Medication 40 MILLIGRAM(S): at 06:16

## 2022-12-31 RX ADMIN — Medication 1 DROP(S): at 06:00

## 2022-12-31 RX ADMIN — Medication 1 DROP(S): at 17:18

## 2022-12-31 RX ADMIN — BUDESONIDE AND FORMOTEROL FUMARATE DIHYDRATE 2 PUFF(S): 160; 4.5 AEROSOL RESPIRATORY (INHALATION) at 12:12

## 2022-12-31 RX ADMIN — ALBUTEROL 2 PUFF(S): 90 AEROSOL, METERED ORAL at 12:12

## 2022-12-31 RX ADMIN — OLANZAPINE 10 MILLIGRAM(S): 15 TABLET, FILM COATED ORAL at 12:10

## 2022-12-31 RX ADMIN — Medication 1: at 12:11

## 2022-12-31 RX ADMIN — ALBUTEROL 2 PUFF(S): 90 AEROSOL, METERED ORAL at 22:30

## 2022-12-31 RX ADMIN — BUDESONIDE AND FORMOTEROL FUMARATE DIHYDRATE 2 PUFF(S): 160; 4.5 AEROSOL RESPIRATORY (INHALATION) at 22:00

## 2022-12-31 RX ADMIN — AMLODIPINE BESYLATE 10 MILLIGRAM(S): 2.5 TABLET ORAL at 06:16

## 2022-12-31 RX ADMIN — Medication 1: at 16:52

## 2022-12-31 RX ADMIN — Medication 150 MICROGRAM(S): at 06:15

## 2022-12-31 RX ADMIN — Medication 1: at 08:40

## 2022-12-31 RX ADMIN — ALBUTEROL 2 PUFF(S): 90 AEROSOL, METERED ORAL at 15:54

## 2022-12-31 NOTE — PROGRESS NOTE ADULT - ASSESSMENT
77 year old F with PMH of HTN, hypothyroidism, CHF, COPD, bipolar d/o, schizoaffective d/o, DM presents after fall from her wheelchair. Admitted for Sepsis 2/2 cellulitis?, copd exacerbation and fall. She was initially admitted to ICU and later down graded to [facility]     Patient was seen and examined, feeling well. No new complaints     Labs reviewed     PE as above     A/P:  #COPD exacerbation   #Sepsis - resolved   #Chronic Diastolic HF   #Hypothyroidism- now w/ low TSH   #S/p mechanical fall   #Hematoma of face/ scalp     Plan:  -Patient is medically stable for DC back to facility, pending [facility] acceptance.    -C/w Lasix 40 home dose.   -C/w levothyroxine, dose adjusted. Need repeat TFTs in 4-6 weeks

## 2022-12-31 NOTE — PROGRESS NOTE ADULT - SUBJECTIVE AND OBJECTIVE BOX
Patient is a 77y old  Female who presents with a chief complaint of Fall (30 Dec 2022 11:30)      INTERVAL HPI/OVERNIGHT EVENTS: no events noted overnight. Pt feels well, wants to go back to her facility.     MEDICATIONS  (STANDING):  albuterol    90 MICROgram(s) HFA Inhaler 2 Puff(s) Inhalation every 6 hours  amLODIPine   Tablet 10 milliGRAM(s) Oral daily  artificial tears (preservative free) Ophthalmic Solution 1 Drop(s) Both EYES two times a day  aspirin  chewable 81 milliGRAM(s) Oral daily  budesonide 160 MICROgram(s)/formoterol 4.5 MICROgram(s) Inhaler 2 Puff(s) Inhalation two times a day  enoxaparin Injectable 40 milliGRAM(s) SubCutaneous every 24 hours  furosemide    Tablet 40 milliGRAM(s) Oral daily  insulin lispro (ADMELOG) corrective regimen sliding scale   SubCutaneous three times a day before meals  insulin lispro (ADMELOG) corrective regimen sliding scale   SubCutaneous at bedtime  lactulose Syrup 20 Gram(s) Oral two times a day  levothyroxine 150 MICROGram(s) Oral daily  OLANZapine 10 milliGRAM(s) Oral daily    MEDICATIONS  (PRN):  acetaminophen     Tablet .. 650 milliGRAM(s) Oral every 6 hours PRN Temp greater or equal to 38C (100.4F), Mild Pain (1 - 3)  traMADol 25 milliGRAM(s) Oral every 6 hours PRN Moderate Pain (4 - 6)  traMADol 50 milliGRAM(s) Oral every 6 hours PRN Severe Pain (7 - 10)      __________________________________________________  REVIEW OF SYSTEMS:    CONSTITUTIONAL: No fever,   EYES: no acute visual disturbances  NECK: No pain or stiffness  RESPIRATORY: No cough; No shortness of breath  CARDIOVASCULAR: No chest pain, no palpitations  GASTROINTESTINAL: No pain. No nausea or vomiting; No diarrhea   NEUROLOGICAL: No headache or numbness, no tremors  MUSCULOSKELETAL: No joint pain, no muscle pain  GENITOURINARY: no dysuria, no frequency, no hesitancy  PSYCHIATRY: no depression , no anxiety  ALL OTHER  ROS negative        Vital Signs Last 24 Hrs  T(C): 37.2 (31 Dec 2022 12:47), Max: 37.2 (31 Dec 2022 12:47)  T(F): 99 (31 Dec 2022 12:47), Max: 99 (31 Dec 2022 12:47)  HR: 96 (31 Dec 2022 12:47) (87 - 96)  BP: 135/62 (31 Dec 2022 12:47) (134/63 - 138/64)  BP(mean): --  RR: 18 (31 Dec 2022 12:47) (18 - 19)  SpO2: 90% (31 Dec 2022 12:47) (90% - 95%)    Parameters below as of 31 Dec 2022 12:47  Patient On (Oxygen Delivery Method): nasal cannula  O2 Flow (L/min): 2      ________________________________________________  PHYSICAL EXAM:  GENERAL: NAD  HEENT: left periorbital ecchymosis. Normocephalic;   conjunctivae and sclerae clear; moist mucous membranes;   NECK : supple  CHEST/LUNG: Diminished breath sounds bilateral bases.  HEART: S1 S2  regular; no murmurs, gallops or rubs  ABDOMEN: Soft, Obese,  Nontender, Nondistended; Bowel sounds present  EXTREMITIES: no cyanosis; no edema; no calf tenderness  SKIN: Bilateral lower extremities with chronic venous stasis.  NERVOUS SYSTEM:  Awake and alert; Oriented  to place, person and time ; no new deficits  _________________________________________________  LABS:                        12.6   14.22 )-----------( 286      ( 30 Dec 2022 07:52 )             41.1     12-30    142  |  103  |  21<H>  ----------------------------<  166<H>  3.7   |  34<H>  |  0.63    Ca    8.9      30 Dec 2022 07:52  Phos  3.7     12-30  Mg     2.3     12-30    TPro  6.5  /  Alb  2.5<L>  /  TBili  0.3  /  DBili  x   /  AST  11  /  ALT  33  /  AlkPhos  69  12-30        CAPILLARY BLOOD GLUCOSE      POCT Blood Glucose.: 180 mg/dL (31 Dec 2022 11:41)  POCT Blood Glucose.: 165 mg/dL (31 Dec 2022 07:58)  POCT Blood Glucose.: 159 mg/dL (30 Dec 2022 20:58)  POCT Blood Glucose.: 195 mg/dL (30 Dec 2022 16:52)        RADIOLOGY & ADDITIONAL TESTS:    Imaging Personally Reviewed:  YES    Consultant(s) Notes Reviewed:   YES    Care Discussed with Consultants : YES     Plan of care was discussed with patient and /or primary care giver; all questions and concerns were addressed and care was aligned with patient's wishes.

## 2022-12-31 NOTE — PROGRESS NOTE ADULT - SUBJECTIVE AND OBJECTIVE BOX
Time of Visit:  Patient seen and examined. pat is laying in bed comfortable     MEDICATIONS  (STANDING):  albuterol    90 MICROgram(s) HFA Inhaler 2 Puff(s) Inhalation every 6 hours  amLODIPine   Tablet 10 milliGRAM(s) Oral daily  artificial tears (preservative free) Ophthalmic Solution 1 Drop(s) Both EYES two times a day  aspirin  chewable 81 milliGRAM(s) Oral daily  budesonide 160 MICROgram(s)/formoterol 4.5 MICROgram(s) Inhaler 2 Puff(s) Inhalation two times a day  enoxaparin Injectable 40 milliGRAM(s) SubCutaneous every 24 hours  furosemide    Tablet 40 milliGRAM(s) Oral daily  insulin lispro (ADMELOG) corrective regimen sliding scale   SubCutaneous three times a day before meals  insulin lispro (ADMELOG) corrective regimen sliding scale   SubCutaneous at bedtime  lactulose Syrup 20 Gram(s) Oral two times a day  levothyroxine 150 MICROGram(s) Oral daily  OLANZapine 10 milliGRAM(s) Oral daily      MEDICATIONS  (PRN):  acetaminophen     Tablet .. 650 milliGRAM(s) Oral every 6 hours PRN Temp greater or equal to 38C (100.4F), Mild Pain (1 - 3)  traMADol 25 milliGRAM(s) Oral every 6 hours PRN Moderate Pain (4 - 6)  traMADol 50 milliGRAM(s) Oral every 6 hours PRN Severe Pain (7 - 10)       Medications up to date at time of exam.      PHYSICAL EXAMINATION:  Patient has no new complaints.  GENERAL: The patient is a well-developed, well-nourished, in no apparent distress.     Vital Signs Last 24 Hrs  T(C): 37.2 (31 Dec 2022 12:47), Max: 37.2 (31 Dec 2022 12:47)  T(F): 99 (31 Dec 2022 12:47), Max: 99 (31 Dec 2022 12:47)  HR: 96 (31 Dec 2022 12:47) (87 - 96)  BP: 135/62 (31 Dec 2022 12:47) (134/63 - 138/64)  BP(mean): --  RR: 18 (31 Dec 2022 12:47) (18 - 19)  SpO2: 90% (31 Dec 2022 12:47) (90% - 95%)    Parameters below as of 31 Dec 2022 12:47  Patient On (Oxygen Delivery Method): nasal cannula  O2 Flow (L/min): 2     (if applicable)    Chest Tube (if applicable)    HEENT: Head is normocephalic and atraumatic. Extraocular muscles are intact. Mucous membranes are moist.     NECK: Supple, no palpable adenopathy.    LUNGS: Clear to auscultation, no wheezing, rales, or rhonchi.    HEART: Regular rate and rhythm without murmur.    ABDOMEN: Soft, nontender, and nondistended.  No hepatosplenomegaly is noted.    EXTREMITIES: + b/l LE chronic skin changes with edema     NEUROLOGIC: Awake, alert, oriented, grossly intact    SKIN: Warm, dry, good turgor.      LABS:                        12.6   14.22 )-----------( 286      ( 30 Dec 2022 07:52 )             41.1     12-30    142  |  103  |  21<H>  ----------------------------<  166<H>  3.7   |  34<H>  |  0.63    Ca    8.9      30 Dec 2022 07:52  Phos  3.7     12-30  Mg     2.3     12-30    TPro  6.5  /  Alb  2.5<L>  /  TBili  0.3  /  DBili  x   /  AST  11  /  ALT  33  /  AlkPhos  69  12-30                        MICROBIOLOGY: (if applicable)    RADIOLOGY & ADDITIONAL STUDIES:  EKG:   CXR:  ECHO:    IMPRESSION: 77y Female PAST MEDICAL & SURGICAL HISTORY:  HTN (hypertension)      Schizo affective schizophrenia      Bipolar 1 disorder      Hypothyroidism      Arthritis      Hypercholesteremia      Chronic obstructive pulmonary disease, unspecified COPD type      Chronic diastolic congestive heart failure      Interstitial lung disease      Poor historian      No significant past surgical history       p/w           Impression: This is a 76 Y/O Female from [facility] .Presented to ED due to had mechanical fall from wheelchair with head trauma. CT shows left frontotemporal scalp hematoma. O2 saturation 86% room air due to Acute hypoxic respiratory failure secondary to COPD. CXR on 12-23-22 Mild Lung opacity due to chronic diastolic CHF. Negative swab on 12-23-22 for Covid 19. Negative RVP, Blood Cx x2 .  Admitted also with Sepsis due to LE Cellulitis.     - Completed course of antibx   - Continue Symbicort   - Duonebs q6h   - Skin care as per nursing to b/l LE   - Fall precaution   - Placement to facility pending

## 2023-01-01 LAB
GLUCOSE BLDC GLUCOMTR-MCNC: 144 MG/DL — HIGH (ref 70–99)
GLUCOSE BLDC GLUCOMTR-MCNC: 156 MG/DL — HIGH (ref 70–99)
GLUCOSE BLDC GLUCOMTR-MCNC: 167 MG/DL — HIGH (ref 70–99)
GLUCOSE BLDC GLUCOMTR-MCNC: 171 MG/DL — HIGH (ref 70–99)
GLUCOSE BLDC GLUCOMTR-MCNC: 173 MG/DL — HIGH (ref 70–99)
GLUCOSE BLDC GLUCOMTR-MCNC: 191 MG/DL — HIGH (ref 70–99)

## 2023-01-01 PROCEDURE — 99232 SBSQ HOSP IP/OBS MODERATE 35: CPT

## 2023-01-01 RX ADMIN — Medication 1 DROP(S): at 18:19

## 2023-01-01 RX ADMIN — ALBUTEROL 2 PUFF(S): 90 AEROSOL, METERED ORAL at 10:45

## 2023-01-01 RX ADMIN — ENOXAPARIN SODIUM 40 MILLIGRAM(S): 100 INJECTION SUBCUTANEOUS at 05:09

## 2023-01-01 RX ADMIN — ALBUTEROL 2 PUFF(S): 90 AEROSOL, METERED ORAL at 16:00

## 2023-01-01 RX ADMIN — Medication 150 MICROGRAM(S): at 05:09

## 2023-01-01 RX ADMIN — Medication 1 DROP(S): at 05:10

## 2023-01-01 RX ADMIN — AMLODIPINE BESYLATE 10 MILLIGRAM(S): 2.5 TABLET ORAL at 05:09

## 2023-01-01 RX ADMIN — Medication 1: at 08:30

## 2023-01-01 RX ADMIN — OLANZAPINE 10 MILLIGRAM(S): 15 TABLET, FILM COATED ORAL at 12:12

## 2023-01-01 RX ADMIN — Medication 81 MILLIGRAM(S): at 12:12

## 2023-01-01 RX ADMIN — Medication 1: at 12:12

## 2023-01-01 RX ADMIN — ALBUTEROL 2 PUFF(S): 90 AEROSOL, METERED ORAL at 22:07

## 2023-01-01 RX ADMIN — Medication 40 MILLIGRAM(S): at 05:09

## 2023-01-01 RX ADMIN — BUDESONIDE AND FORMOTEROL FUMARATE DIHYDRATE 2 PUFF(S): 160; 4.5 AEROSOL RESPIRATORY (INHALATION) at 10:45

## 2023-01-01 RX ADMIN — BUDESONIDE AND FORMOTEROL FUMARATE DIHYDRATE 2 PUFF(S): 160; 4.5 AEROSOL RESPIRATORY (INHALATION) at 22:07

## 2023-01-01 RX ADMIN — Medication 1: at 17:20

## 2023-01-01 RX ADMIN — ALBUTEROL 2 PUFF(S): 90 AEROSOL, METERED ORAL at 05:09

## 2023-01-01 NOTE — PROGRESS NOTE ADULT - SUBJECTIVE AND OBJECTIVE BOX
Patient is a 77y old  Female who presents with a chief complaint of Fall (31 Dec 2022 17:09)      INTERVAL HPI/OVERNIGHT EVENTS: Pt w/ no new complaints.     MEDICATIONS  (STANDING):  albuterol    90 MICROgram(s) HFA Inhaler 2 Puff(s) Inhalation every 6 hours  amLODIPine   Tablet 10 milliGRAM(s) Oral daily  artificial tears (preservative free) Ophthalmic Solution 1 Drop(s) Both EYES two times a day  aspirin  chewable 81 milliGRAM(s) Oral daily  budesonide 160 MICROgram(s)/formoterol 4.5 MICROgram(s) Inhaler 2 Puff(s) Inhalation two times a day  enoxaparin Injectable 40 milliGRAM(s) SubCutaneous every 24 hours  furosemide    Tablet 40 milliGRAM(s) Oral daily  insulin lispro (ADMELOG) corrective regimen sliding scale   SubCutaneous three times a day before meals  insulin lispro (ADMELOG) corrective regimen sliding scale   SubCutaneous at bedtime  lactulose Syrup 20 Gram(s) Oral two times a day  levothyroxine 150 MICROGram(s) Oral daily  OLANZapine 10 milliGRAM(s) Oral daily    MEDICATIONS  (PRN):  acetaminophen     Tablet .. 650 milliGRAM(s) Oral every 6 hours PRN Temp greater or equal to 38C (100.4F), Mild Pain (1 - 3)  traMADol 25 milliGRAM(s) Oral every 6 hours PRN Moderate Pain (4 - 6)  traMADol 50 milliGRAM(s) Oral every 6 hours PRN Severe Pain (7 - 10)      __________________________________________________  REVIEW OF SYSTEMS:    CONSTITUTIONAL: No fever,   EYES: no acute visual disturbances  NECK: No pain or stiffness  RESPIRATORY: No cough; No shortness of breath  CARDIOVASCULAR: No chest pain, no palpitations  GASTROINTESTINAL: No pain. No nausea or vomiting; No diarrhea   NEUROLOGICAL: No headache or numbness, no tremors  MUSCULOSKELETAL: No joint pain, no muscle pain  GENITOURINARY: no dysuria, no frequency, no hesitancy  PSYCHIATRY: no depression , no anxiety  ALL OTHER  ROS negative        Vital Signs Last 24 Hrs  T(C): 36.5 (01 Jan 2023 13:10), Max: 36.5 (01 Jan 2023 13:10)  T(F): 97.7 (01 Jan 2023 13:10), Max: 97.7 (01 Jan 2023 13:10)  HR: 86 (01 Jan 2023 13:10) (85 - 94)  BP: 112/62 (01 Jan 2023 13:10) (112/62 - 146/64)  BP(mean): --  RR: 20 (01 Jan 2023 13:10) (18 - 22)  SpO2: 93% (01 Jan 2023 13:10) (93% - 97%)    Parameters below as of 01 Jan 2023 13:10  Patient On (Oxygen Delivery Method): nasal cannula  O2 Flow (L/min): 2      ________________________________________________  PHYSICAL EXAM:  GENERAL: NAD, lying in bed   HEENT: Normocephalic;  conjunctivae and sclerae clear; moist mucous membranes; scalp hematoma on left side w/ discoloration under left eye   NECK : supple  CHEST/LUNG: Clear to auscultation bilaterally with good air entry   HEART: S1 S2  regular; no murmurs, gallops or rubs  ABDOMEN: Soft, Nontender, Nondistended; Bowel sounds present  EXTREMITIES: b/l venous stasis changes   SKIN: warm and dry; no rash  NERVOUS SYSTEM:  Awake and alert; Oriented  to place, person and time ; no new deficits    _________________________________________________        CAPILLARY BLOOD GLUCOSE      POCT Blood Glucose.: 156 mg/dL (01 Jan 2023 16:55)  POCT Blood Glucose.: 171 mg/dL (01 Jan 2023 11:44)  POCT Blood Glucose.: 191 mg/dL (01 Jan 2023 08:19)  POCT Blood Glucose.: 187 mg/dL (31 Dec 2022 21:36)        RADIOLOGY & ADDITIONAL TESTS:    Imaging Personally Reviewed:  YES    Consultant(s) Notes Reviewed:   YES    Care Discussed with Consultants : YES     Plan of care was discussed with patient and /or primary care giver; all questions and concerns were addressed and care was aligned with patient's wishes.

## 2023-01-01 NOTE — PROGRESS NOTE ADULT - ASSESSMENT
77 year old F with PMH of HTN, hypothyroidism, CHF, COPD, bipolar d/o, schizoaffective d/o, DM presents after fall from her wheelchair. Admitted for Sepsis 2/2 cellulitis?, copd exacerbation and fall. She was initially admitted to ICU and later down graded to [facility], currently she is medically stable and pending acceptance to facility          A/P:  #COPD exacerbation   #Sepsis - resolved   #Chronic Diastolic HF   #Hypothyroidism- now w/ low TSH   #S/p mechanical fall   #Hematoma of face/ scalp     Plan:  -Patient is medically stable for DC back to facility, pending [facility] acceptance.    -C/w Lasix 40 home dose.   -C/w levothyroxine, dose adjusted. Need repeat TFTs in 4-6 weeks        77 year old F with PMH of HTN, hypothyroidism, CHF, COPD, bipolar d/o, schizoaffective d/o, DM presents after fall from her wheelchair, also found w/ hypoxia 86% on RA. Admitted [facility] for AHRF 2/2 COPD exacerbation, also treated for sepsis 2/2 cellulitis. Now downgraded  to medical floor.     A/P:  #COPD exacerbation    #Chronic Diastolic HF   #Hypothyroidism- now w/ low TSH   #S/p mechanical fall   #Hematoma of face/ scalp     Plan:  -Completed steroid, c/w albuterol and symbicort.   -C/w Lasix 40 OD  -Patient is medically stable for DC back to facility, pending [facility] acceptance.    -C/w levothyroxine, dose adjusted. Need repeat TFTs in 4-6 weeks

## 2023-01-02 LAB
GLUCOSE BLDC GLUCOMTR-MCNC: 116 MG/DL — HIGH (ref 70–99)
GLUCOSE BLDC GLUCOMTR-MCNC: 122 MG/DL — HIGH (ref 70–99)
GLUCOSE BLDC GLUCOMTR-MCNC: 140 MG/DL — HIGH (ref 70–99)
GLUCOSE BLDC GLUCOMTR-MCNC: 160 MG/DL — HIGH (ref 70–99)

## 2023-01-02 RX ADMIN — ALBUTEROL 2 PUFF(S): 90 AEROSOL, METERED ORAL at 14:38

## 2023-01-02 RX ADMIN — AMLODIPINE BESYLATE 10 MILLIGRAM(S): 2.5 TABLET ORAL at 06:04

## 2023-01-02 RX ADMIN — ENOXAPARIN SODIUM 40 MILLIGRAM(S): 100 INJECTION SUBCUTANEOUS at 06:05

## 2023-01-02 RX ADMIN — ALBUTEROL 2 PUFF(S): 90 AEROSOL, METERED ORAL at 08:06

## 2023-01-02 RX ADMIN — Medication 1 DROP(S): at 17:39

## 2023-01-02 RX ADMIN — ALBUTEROL 2 PUFF(S): 90 AEROSOL, METERED ORAL at 21:30

## 2023-01-02 RX ADMIN — Medication 81 MILLIGRAM(S): at 11:56

## 2023-01-02 RX ADMIN — BUDESONIDE AND FORMOTEROL FUMARATE DIHYDRATE 2 PUFF(S): 160; 4.5 AEROSOL RESPIRATORY (INHALATION) at 10:00

## 2023-01-02 RX ADMIN — TRAMADOL HYDROCHLORIDE 50 MILLIGRAM(S): 50 TABLET ORAL at 14:40

## 2023-01-02 RX ADMIN — Medication 150 MICROGRAM(S): at 06:04

## 2023-01-02 RX ADMIN — Medication 1: at 08:04

## 2023-01-02 RX ADMIN — OLANZAPINE 10 MILLIGRAM(S): 15 TABLET, FILM COATED ORAL at 11:55

## 2023-01-02 RX ADMIN — BUDESONIDE AND FORMOTEROL FUMARATE DIHYDRATE 2 PUFF(S): 160; 4.5 AEROSOL RESPIRATORY (INHALATION) at 21:30

## 2023-01-02 RX ADMIN — TRAMADOL HYDROCHLORIDE 50 MILLIGRAM(S): 50 TABLET ORAL at 15:30

## 2023-01-02 RX ADMIN — Medication 1 DROP(S): at 06:06

## 2023-01-02 RX ADMIN — Medication 40 MILLIGRAM(S): at 06:04

## 2023-01-02 NOTE — PROGRESS NOTE ADULT - SUBJECTIVE AND OBJECTIVE BOX
Patient is a 77y old  Female who presents with a chief complaint of Fall (01 Jan 2023 17:28)    PATIENT IS SEEN AND EXAMINED IN MEDICAL FLOOR.  NGT [    ]    GLORIA [   ]      GT [   ]    ALLERGIES:  Avelox (Unknown)  moxifloxacin (Unknown)      Daily     Daily     VITALS:    Vital Signs Last 24 Hrs  T(C): 36.8 (02 Jan 2023 05:08), Max: 36.8 (02 Jan 2023 05:08)  T(F): 98.3 (02 Jan 2023 05:08), Max: 98.3 (02 Jan 2023 05:08)  HR: 93 (02 Jan 2023 05:08) (86 - 93)  BP: 134/62 (02 Jan 2023 05:08) (112/62 - 140/71)  BP(mean): --  RR: 19 (02 Jan 2023 05:08) (19 - 20)  SpO2: 96% (02 Jan 2023 05:08) (93% - 96%)    Parameters below as of 02 Jan 2023 05:08  Patient On (Oxygen Delivery Method): nasal cannula  O2 Flow (L/min): 2      LABS:              CAPILLARY BLOOD GLUCOSE      POCT Blood Glucose.: 160 mg/dL (02 Jan 2023 07:55)  POCT Blood Glucose.: 144 mg/dL (01 Jan 2023 23:33)  POCT Blood Glucose.: 173 mg/dL (01 Jan 2023 21:35)  POCT Blood Glucose.: 156 mg/dL (01 Jan 2023 16:55)  POCT Blood Glucose.: 171 mg/dL (01 Jan 2023 11:44)          Creatinine Trend: 0.63<--, 0.76<--, 0.74<--, 0.63<--, 0.66<--, 0.72<--  I&O's Summary          Clean Catch Clean Catch (Midstream)  12-24 @ 04:10   No growth  --  --      .Blood Blood-Peripheral  12-23 @ 22:22   No Growth Final  --  --      .Blood Blood-Peripheral  12-23 @ 22:00   No Growth Final  --  --          MEDICATIONS:    MEDICATIONS  (STANDING):  albuterol    90 MICROgram(s) HFA Inhaler 2 Puff(s) Inhalation every 6 hours  amLODIPine   Tablet 10 milliGRAM(s) Oral daily  artificial tears (preservative free) Ophthalmic Solution 1 Drop(s) Both EYES two times a day  aspirin  chewable 81 milliGRAM(s) Oral daily  budesonide 160 MICROgram(s)/formoterol 4.5 MICROgram(s) Inhaler 2 Puff(s) Inhalation two times a day  enoxaparin Injectable 40 milliGRAM(s) SubCutaneous every 24 hours  furosemide    Tablet 40 milliGRAM(s) Oral daily  insulin lispro (ADMELOG) corrective regimen sliding scale   SubCutaneous three times a day before meals  insulin lispro (ADMELOG) corrective regimen sliding scale   SubCutaneous at bedtime  lactulose Syrup 20 Gram(s) Oral two times a day  levothyroxine 150 MICROGram(s) Oral daily  OLANZapine 10 milliGRAM(s) Oral daily      MEDICATIONS  (PRN):  acetaminophen     Tablet .. 650 milliGRAM(s) Oral every 6 hours PRN Temp greater or equal to 38C (100.4F), Mild Pain (1 - 3)  traMADol 25 milliGRAM(s) Oral every 6 hours PRN Moderate Pain (4 - 6)  traMADol 50 milliGRAM(s) Oral every 6 hours PRN Severe Pain (7 - 10)      REVIEW OF SYSTEMS:                           ALL ROS DONE [ X   ]    CONSTITUTIONAL:  LETHARGIC [   ], FEVER [   ], UNRESPONSIVE [   ]  CVS:  CP  [   ], SOB, [   ], PALPITATIONS [   ], DIZZYNESS [   ]  RS: COUGH [   ], SPUTUM [   ]  GI: ABDOMINAL PAIN [   ], NAUSEA [   ], VOMITINGS [   ], DIARRHEA [   ], CONSTIPATION [   ]  :  DYSURIA [   ], NOCTURIA [   ], INCREASED FREQUENCY [   ], DRIBLING [   ],  SKELETAL: PAINFUL JOINTS [   ], SWOLLEN JOINTS [   ], NECK ACHE [   ], LOW BACK ACHE [   ],  SKIN : ULCERS [   ], RASH [   ], ITCHING [   ]  CNS: HEAD ACHE [   ], DOUBLE VISION [   ], BLURRED VISION [   ], AMS / CONFUSION [   ], SEIZURES [   ], WEAKNESS [   ],TINGLING / NUMBNESS [   ]    PHYSICAL EXAMINATION:  GENERAL APPEARANCE: NO DISTRESS  HEENT:  NO PALLOR, NO  JVD,  NO   NODES, NECK SUPPLE  CVS: S1 +, S2 +,   RS: AEEB,  OCCASIONAL  RALES +,   NO RONCHI  ABD: SOFT, NT, NO, BS +  EXT: NO PE  SKIN: WARM,   SKELETAL:  ROM ACCEPTABLE  CNS:  AAO X 2-3    RADIOLOGY :    REVIEWED    ASSESSMENT :     Chronic obstructive pulmonary disease with acute exacerbation    HTN (hypertension)    Schizo affective schizophrenia    Bipolar 1 disorder    Hypothyroidism    Arthritis    Hypercholesteremia    Chronic obstructive pulmonary disease, unspecified COPD type    Chronic diastolic congestive heart failure    Interstitial lung disease    Poor historian    No significant past surgical history        PLAN:  HPI:  77 year old F with PMH of HTN, hypothyroidism, CHF, COPD, bipolar d/o, schizoaffective d/o, DM presents after fall. Pt reports she fell from her wheelchair, denies LOC but reports head trauma and left lower leg trauma and is now pain free. Denies pain, fever, chills, shortness of breath, cough, wheezing, chest pain, lightheadedness, dizziness, n/v/d, changes in urinary or bowel habits.    ED Course:  Vitals: /69 P 120 R 18 T 98.2F > 102.2F  SpO2 97% RA  Meds: unasyn, solumedrol 125 mg IV, duonebs x 2 (24 Dec 2022 03:22)    # SEPSIS S/T RLE CELLULITIS IMPROVED S/P ABX    # S/P MECHANICAL FALL W/ SCALP/FACE  HEMATOMA - S/P PT EVAL    # COPD EXACERBATION S/P STEROID COURSE, ON ALBUTEROL AND SYMBICORT - PULMONOLOGY EVALUATION IN PROGRESS    # CHRONIC DIASTOLIC CHF - ON LASIX, STRICT IS AND OS    # HYPOTHYROIDISM  # LOW TFTS  - LEVOTHYROXINE DOSE ADJUSTED   - F/U TFTS IN 4-6 WEEKS    # PENDING LICO PLACEMENT    # GI AND DVT PPX     Patient is a 77y old  Female who presents with a chief complaint of Fall (01 Jan 2023 17:28)    PATIENT IS SEEN AND EXAMINED IN MEDICAL FLOOR.      ALLERGIES:  Avelox (Unknown)  moxifloxacin (Unknown)      VITALS:    Vital Signs Last 24 Hrs  T(C): 36.8 (02 Jan 2023 05:08), Max: 36.8 (02 Jan 2023 05:08)  T(F): 98.3 (02 Jan 2023 05:08), Max: 98.3 (02 Jan 2023 05:08)  HR: 93 (02 Jan 2023 05:08) (86 - 93)  BP: 134/62 (02 Jan 2023 05:08) (112/62 - 140/71)  BP(mean): --  RR: 19 (02 Jan 2023 05:08) (19 - 20)  SpO2: 96% (02 Jan 2023 05:08) (93% - 96%)    Parameters below as of 02 Jan 2023 05:08  Patient On (Oxygen Delivery Method): nasal cannula  O2 Flow (L/min): 2      LABS:      CAPILLARY BLOOD GLUCOSE      POCT Blood Glucose.: 160 mg/dL (02 Jan 2023 07:55)  POCT Blood Glucose.: 144 mg/dL (01 Jan 2023 23:33)  POCT Blood Glucose.: 173 mg/dL (01 Jan 2023 21:35)  POCT Blood Glucose.: 156 mg/dL (01 Jan 2023 16:55)  POCT Blood Glucose.: 171 mg/dL (01 Jan 2023 11:44)    Creatinine Trend: 0.63<--, 0.76<--, 0.74<--, 0.63<--, 0.66<--, 0.72<--  I&O's Summary      Clean Catch Clean Catch (Midstream)  12-24 @ 04:10   No growth  --  --      .Blood Blood-Peripheral  12-23 @ 22:22   No Growth Final  --  --      .Blood Blood-Peripheral  12-23 @ 22:00   No Growth Final  --  --          MEDICATIONS:    MEDICATIONS  (STANDING):  albuterol    90 MICROgram(s) HFA Inhaler 2 Puff(s) Inhalation every 6 hours  amLODIPine   Tablet 10 milliGRAM(s) Oral daily  artificial tears (preservative free) Ophthalmic Solution 1 Drop(s) Both EYES two times a day  aspirin  chewable 81 milliGRAM(s) Oral daily  budesonide 160 MICROgram(s)/formoterol 4.5 MICROgram(s) Inhaler 2 Puff(s) Inhalation two times a day  enoxaparin Injectable 40 milliGRAM(s) SubCutaneous every 24 hours  furosemide    Tablet 40 milliGRAM(s) Oral daily  insulin lispro (ADMELOG) corrective regimen sliding scale   SubCutaneous three times a day before meals  insulin lispro (ADMELOG) corrective regimen sliding scale   SubCutaneous at bedtime  lactulose Syrup 20 Gram(s) Oral two times a day  levothyroxine 150 MICROGram(s) Oral daily  OLANZapine 10 milliGRAM(s) Oral daily      MEDICATIONS  (PRN):  acetaminophen     Tablet .. 650 milliGRAM(s) Oral every 6 hours PRN Temp greater or equal to 38C (100.4F), Mild Pain (1 - 3)  traMADol 25 milliGRAM(s) Oral every 6 hours PRN Moderate Pain (4 - 6)  traMADol 50 milliGRAM(s) Oral every 6 hours PRN Severe Pain (7 - 10)      REVIEW OF SYSTEMS:                           ALL ROS DONE [ X   ]    CONSTITUTIONAL:  LETHARGIC [   ], FEVER [   ], UNRESPONSIVE [   ]  CVS:  CP  [   ], SOB, [   ], PALPITATIONS [   ], DIZZYNESS [   ]  RS: COUGH [   ], SPUTUM [   ]  GI: ABDOMINAL PAIN [   ], NAUSEA [   ], VOMITINGS [   ], DIARRHEA [   ], CONSTIPATION [   ]  :  DYSURIA [   ], NOCTURIA [   ], INCREASED FREQUENCY [   ], DRIBLING [   ],  SKELETAL: PAINFUL JOINTS [   ], SWOLLEN JOINTS [   ], NECK ACHE [   ], LOW BACK ACHE [   ],  SKIN : ULCERS [   ], RASH [   ], ITCHING [   ]  CNS: HEAD ACHE [   ], DOUBLE VISION [   ], BLURRED VISION [   ], AMS / CONFUSION [   ], SEIZURES [   ], WEAKNESS [   ],TINGLING / NUMBNESS [   ]    PHYSICAL EXAMINATION:  GENERAL APPEARANCE: NO DISTRESS  HEENT:  NO PALLOR, NO  JVD,  NO   NODES, NECK SUPPLE    ;   BRUISING ON FACE +  CVS: S1 +, S2 +,   RS: AEEB,  OCCASIONAL  RALES +,   NO RONCHI  ABD: SOFT, NT, NO, BS +  EXT: NO PE  SKIN: WARM,   SKELETAL:  ROM ACCEPTABLE  CNS:  AAO X 2-3    RADIOLOGY :    REVIEWED    ASSESSMENT :     Chronic obstructive pulmonary disease with acute exacerbation    HTN (hypertension)    Schizo affective schizophrenia    Bipolar 1 disorder    Hypothyroidism    Arthritis    Hypercholesteremia    Chronic obstructive pulmonary disease, unspecified COPD type    Chronic diastolic congestive heart failure    Interstitial lung disease    Poor historian    No significant past surgical history        PLAN:  HPI:  77 year old F with PMH of HTN, hypothyroidism, CHF, COPD, bipolar d/o, schizoaffective d/o, DM presents after fall. Pt reports she fell from her wheelchair, denies LOC but reports head trauma and left lower leg trauma and is now pain free. Denies pain, fever, chills, shortness of breath, cough, wheezing, chest pain, lightheadedness, dizziness, n/v/d, changes in urinary or bowel habits.    ED Course:  Vitals: /69 P 120 R 18 T 98.2F > 102.2F  SpO2 97% RA  Meds: unasyn, solumedrol 125 mg IV, duonebs x 2 (24 Dec 2022 03:22)    # PATIENT IS PENDING LICO PLACEMENT    # SEPSIS S/T RLE CELLULITIS IMPROVED S/P ABX    # S/P MECHANICAL FALL W/ SCALP/FACE  HEMATOMA - S/P PT EVAL    # COPD EXACERBATION S/P STEROID COURSE, ON ALBUTEROL AND SYMBICORT - PULMONOLOGY EVALUATION IN PROGRESS    # CHRONIC DIASTOLIC CHF - ON LASIX, STRICT IS AND OS    # HYPOTHYROIDISM  # LOW TFTS  - LEVOTHYROXINE DOSE ADJUSTED   - F/U TFTS IN 4-6 WEEKS    # GI AND DVT PPX

## 2023-01-02 NOTE — PROGRESS NOTE ADULT - SUBJECTIVE AND OBJECTIVE BOX
Time of Visit:  Patient seen and examined.     MEDICATIONS  (STANDING):  albuterol    90 MICROgram(s) HFA Inhaler 2 Puff(s) Inhalation every 6 hours  amLODIPine   Tablet 10 milliGRAM(s) Oral daily  artificial tears (preservative free) Ophthalmic Solution 1 Drop(s) Both EYES two times a day  aspirin  chewable 81 milliGRAM(s) Oral daily  budesonide 160 MICROgram(s)/formoterol 4.5 MICROgram(s) Inhaler 2 Puff(s) Inhalation two times a day  enoxaparin Injectable 40 milliGRAM(s) SubCutaneous every 24 hours  furosemide    Tablet 40 milliGRAM(s) Oral daily  insulin lispro (ADMELOG) corrective regimen sliding scale   SubCutaneous three times a day before meals  insulin lispro (ADMELOG) corrective regimen sliding scale   SubCutaneous at bedtime  lactulose Syrup 20 Gram(s) Oral two times a day  levothyroxine 150 MICROGram(s) Oral daily  OLANZapine 10 milliGRAM(s) Oral daily      MEDICATIONS  (PRN):  acetaminophen     Tablet .. 650 milliGRAM(s) Oral every 6 hours PRN Temp greater or equal to 38C (100.4F), Mild Pain (1 - 3)  traMADol 25 milliGRAM(s) Oral every 6 hours PRN Moderate Pain (4 - 6)  traMADol 50 milliGRAM(s) Oral every 6 hours PRN Severe Pain (7 - 10)       Medications up to date at time of exam.      PHYSICAL EXAMINATION:  Patient has no new complaints.  GENERAL: The patient is a well-developed, well-nourished, in no apparent distress.     Vital Signs Last 24 Hrs  T(C): 37.1 (02 Jan 2023 15:10), Max: 37.1 (02 Jan 2023 15:10)  T(F): 98.8 (02 Jan 2023 15:10), Max: 98.8 (02 Jan 2023 15:10)  HR: 92 (02 Jan 2023 15:10) (91 - 93)  BP: 124/56 (02 Jan 2023 15:10) (124/56 - 140/71)  BP(mean): --  RR: 20 (02 Jan 2023 15:10) (19 - 20)  SpO2: 98% (02 Jan 2023 15:10) (94% - 98%)    Parameters below as of 02 Jan 2023 15:10  Patient On (Oxygen Delivery Method): nasal cannula  O2 Flow (L/min): 2     (if applicable)    Chest Tube (if applicable)    HEENT: Head is normocephalic and atraumatic. Extraocular muscles are intact. Mucous membranes are moist.     NECK: Supple, no palpable adenopathy.    LUNGS: Clear to auscultation, no wheezing, rales, or rhonchi.    HEART: Regular rate and rhythm without murmur.    ABDOMEN: Soft, nontender, and nondistended.  No hepatosplenomegaly is noted.    : No painful voiding, no pelvic pain    EXTREMITIES: left leg chronic skin changes     NEUROLOGIC: Awake, alert, oriented, grossly intact    SKIN: Warm, dry, good turgor.      LABS:                              MICROBIOLOGY: (if applicable)    RADIOLOGY & ADDITIONAL STUDIES:  EKG:   CXR:  ECHO:    IMPRESSION: 77y Female PAST MEDICAL & SURGICAL HISTORY:  HTN (hypertension)      Schizo affective schizophrenia      Bipolar 1 disorder      Hypothyroidism      Arthritis      Hypercholesteremia      Chronic obstructive pulmonary disease, unspecified COPD type      Chronic diastolic congestive heart failure      Interstitial lung disease      Poor historian      No significant past surgical history       p/w         Impression: This is a 78 Y/O Female from [facility] .Presented to ED due to had mechanical fall from wheelchair with head trauma. CT shows left frontotemporal scalp hematoma. O2 saturation 86% room air due to Acute hypoxic respiratory failure secondary to COPD. CXR on 12-23-22 Mild Lung opacity due to chronic diastolic CHF. Negative swab on 12-23-22 for Covid 19. Negative RVP, Blood Cx x2 .  Admitted also with Sepsis due to LE Cellulitis.     - Completed course of antibx   - Continue Symbicort   - Duonebs q6h   - Skin care as per nursing to b/l LE   - Fall precaution   - Placement to facility pending

## 2023-01-03 DIAGNOSIS — Z02.9 ENCOUNTER FOR ADMINISTRATIVE EXAMINATIONS, UNSPECIFIED: ICD-10-CM

## 2023-01-03 LAB
ANION GAP SERPL CALC-SCNC: 10 MMOL/L — SIGNIFICANT CHANGE UP (ref 5–17)
BUN SERPL-MCNC: 30 MG/DL — HIGH (ref 7–18)
CALCIUM SERPL-MCNC: 9.3 MG/DL — SIGNIFICANT CHANGE UP (ref 8.4–10.5)
CHLORIDE SERPL-SCNC: 101 MMOL/L — SIGNIFICANT CHANGE UP (ref 96–108)
CO2 SERPL-SCNC: 30 MMOL/L — SIGNIFICANT CHANGE UP (ref 22–31)
CREAT SERPL-MCNC: 0.81 MG/DL — SIGNIFICANT CHANGE UP (ref 0.5–1.3)
EGFR: 76 ML/MIN/1.73M2 — SIGNIFICANT CHANGE UP
GLUCOSE BLDC GLUCOMTR-MCNC: 154 MG/DL — HIGH (ref 70–99)
GLUCOSE BLDC GLUCOMTR-MCNC: 156 MG/DL — HIGH (ref 70–99)
GLUCOSE BLDC GLUCOMTR-MCNC: 159 MG/DL — HIGH (ref 70–99)
GLUCOSE BLDC GLUCOMTR-MCNC: 172 MG/DL — HIGH (ref 70–99)
GLUCOSE SERPL-MCNC: 175 MG/DL — HIGH (ref 70–99)
HCT VFR BLD CALC: 39.9 % — SIGNIFICANT CHANGE UP (ref 34.5–45)
HGB BLD-MCNC: 12.2 G/DL — SIGNIFICANT CHANGE UP (ref 11.5–15.5)
MCHC RBC-ENTMCNC: 25.6 PG — LOW (ref 27–34)
MCHC RBC-ENTMCNC: 30.6 GM/DL — LOW (ref 32–36)
MCV RBC AUTO: 83.6 FL — SIGNIFICANT CHANGE UP (ref 80–100)
NRBC # BLD: 0 /100 WBCS — SIGNIFICANT CHANGE UP (ref 0–0)
PLATELET # BLD AUTO: 277 K/UL — SIGNIFICANT CHANGE UP (ref 150–400)
POTASSIUM SERPL-MCNC: 3.2 MMOL/L — LOW (ref 3.5–5.3)
POTASSIUM SERPL-SCNC: 3.2 MMOL/L — LOW (ref 3.5–5.3)
RBC # BLD: 4.77 M/UL — SIGNIFICANT CHANGE UP (ref 3.8–5.2)
RBC # FLD: 17.7 % — HIGH (ref 10.3–14.5)
SARS-COV-2 RNA SPEC QL NAA+PROBE: SIGNIFICANT CHANGE UP
SODIUM SERPL-SCNC: 141 MMOL/L — SIGNIFICANT CHANGE UP (ref 135–145)
WBC # BLD: 12.4 K/UL — HIGH (ref 3.8–10.5)
WBC # FLD AUTO: 12.4 K/UL — HIGH (ref 3.8–10.5)

## 2023-01-03 PROCEDURE — 99231 SBSQ HOSP IP/OBS SF/LOW 25: CPT

## 2023-01-03 RX ORDER — POTASSIUM CHLORIDE 20 MEQ
40 PACKET (EA) ORAL ONCE
Refills: 0 | Status: COMPLETED | OUTPATIENT
Start: 2023-01-03 | End: 2023-01-03

## 2023-01-03 RX ADMIN — Medication 1: at 11:44

## 2023-01-03 RX ADMIN — ENOXAPARIN SODIUM 40 MILLIGRAM(S): 100 INJECTION SUBCUTANEOUS at 06:35

## 2023-01-03 RX ADMIN — TRAMADOL HYDROCHLORIDE 50 MILLIGRAM(S): 50 TABLET ORAL at 22:56

## 2023-01-03 RX ADMIN — Medication 40 MILLIEQUIVALENT(S): at 10:03

## 2023-01-03 RX ADMIN — LACTULOSE 20 GRAM(S): 10 SOLUTION ORAL at 06:35

## 2023-01-03 RX ADMIN — Medication 1 DROP(S): at 06:36

## 2023-01-03 RX ADMIN — ALBUTEROL 2 PUFF(S): 90 AEROSOL, METERED ORAL at 15:01

## 2023-01-03 RX ADMIN — AMLODIPINE BESYLATE 10 MILLIGRAM(S): 2.5 TABLET ORAL at 06:34

## 2023-01-03 RX ADMIN — ALBUTEROL 2 PUFF(S): 90 AEROSOL, METERED ORAL at 21:53

## 2023-01-03 RX ADMIN — Medication 150 MICROGRAM(S): at 06:35

## 2023-01-03 RX ADMIN — Medication 1: at 17:32

## 2023-01-03 RX ADMIN — ALBUTEROL 2 PUFF(S): 90 AEROSOL, METERED ORAL at 10:03

## 2023-01-03 RX ADMIN — Medication 40 MILLIGRAM(S): at 06:34

## 2023-01-03 RX ADMIN — BUDESONIDE AND FORMOTEROL FUMARATE DIHYDRATE 2 PUFF(S): 160; 4.5 AEROSOL RESPIRATORY (INHALATION) at 21:54

## 2023-01-03 RX ADMIN — TRAMADOL HYDROCHLORIDE 50 MILLIGRAM(S): 50 TABLET ORAL at 21:53

## 2023-01-03 RX ADMIN — Medication 81 MILLIGRAM(S): at 11:45

## 2023-01-03 RX ADMIN — OLANZAPINE 10 MILLIGRAM(S): 15 TABLET, FILM COATED ORAL at 11:45

## 2023-01-03 RX ADMIN — Medication 1: at 07:54

## 2023-01-03 RX ADMIN — BUDESONIDE AND FORMOTEROL FUMARATE DIHYDRATE 2 PUFF(S): 160; 4.5 AEROSOL RESPIRATORY (INHALATION) at 10:03

## 2023-01-03 NOTE — PROGRESS NOTE ADULT - CONVERSATION DETAILS
CASE DISCUSSED AT LENGTH WITH PATIENT. ALL QUESTIONS ANSWERED. DISCUSSED WITH PATIENT REGARDING HER WISHES FOR GOC - PATIENT EXPRESSES SHE WISHES TO BE FULL CODE.

## 2023-01-03 NOTE — PROGRESS NOTE ADULT - PROBLEM SELECTOR PLAN 1
- Admitted with fever 102.2  - Given vanco and Unasyn in ED.  - CXR shows There is mild patchy opacification the lungs appears compatible with mild pulmonary edema.     - Serum lactate normal  - blood cultures and urine culture-NGTD  - c/f Cellulitis but more likely venous stasis- d/c'd Unasyn  - continue to monitor off Antibiotics

## 2023-01-03 NOTE — PROGRESS NOTE ADULT - SUBJECTIVE AND OBJECTIVE BOX
Patient is a 77y old  Female who presents with a chief complaint of Fall (02 Jan 2023 17:26)    PATIENT IS SEEN AND EXAMINED IN MEDICAL FLOOR.    ALLERGIES:  Avelox (Unknown)  moxifloxacin (Unknown)    VITALS:    Vital Signs Last 24 Hrs  T(C): 36.6 (03 Jan 2023 04:42), Max: 37.1 (02 Jan 2023 15:10)  T(F): 97.8 (03 Jan 2023 04:42), Max: 98.8 (02 Jan 2023 15:10)  HR: 92 (03 Jan 2023 04:42) (92 - 96)  BP: 127/62 (03 Jan 2023 04:42) (112/64 - 127/62)  BP(mean): --  RR: 20 (03 Jan 2023 04:42) (20 - 20)  SpO2: 99% (03 Jan 2023 04:42) (94% - 99%)    Parameters below as of 03 Jan 2023 04:42  Patient On (Oxygen Delivery Method): nasal cannula  O2 Flow (L/min): 2      LABS:    CBC Full  -  ( 03 Jan 2023 07:31 )  WBC Count : 12.40 K/uL  RBC Count : 4.77 M/uL  Hemoglobin : 12.2 g/dL  Hematocrit : 39.9 %  Platelet Count - Automated : 277 K/uL  Mean Cell Volume : 83.6 fl  Mean Cell Hemoglobin : 25.6 pg  Mean Cell Hemoglobin Concentration : 30.6 gm/dL  Auto Neutrophil # : x  Auto Lymphocyte # : x  Auto Monocyte # : x  Auto Eosinophil # : x  Auto Basophil # : x  Auto Neutrophil % : x  Auto Lymphocyte % : x  Auto Monocyte % : x  Auto Eosinophil % : x  Auto Basophil % : x      01-03    141  |  101  |  30<H>  ----------------------------<  175<H>  3.2<L>   |  30  |  0.81    Ca    9.3      03 Jan 2023 07:31      CAPILLARY BLOOD GLUCOSE      POCT Blood Glucose.: 156 mg/dL (03 Jan 2023 07:40)  POCT Blood Glucose.: 116 mg/dL (02 Jan 2023 21:59)  POCT Blood Glucose.: 140 mg/dL (02 Jan 2023 16:53)  POCT Blood Glucose.: 122 mg/dL (02 Jan 2023 11:38)          Creatinine Trend: 0.81<--, 0.63<--, 0.76<--, 0.74<--, 0.63<--, 0.66<--  I&O's Summary          Clean Catch Clean Catch (Midstream)  12-24 @ 04:10   No growth  --  --      .Blood Blood-Peripheral  12-23 @ 22:22   No Growth Final  --  --      .Blood Blood-Peripheral  12-23 @ 22:00   No Growth Final  --  --          MEDICATIONS:    MEDICATIONS  (STANDING):  albuterol    90 MICROgram(s) HFA Inhaler 2 Puff(s) Inhalation every 6 hours  amLODIPine   Tablet 10 milliGRAM(s) Oral daily  artificial tears (preservative free) Ophthalmic Solution 1 Drop(s) Both EYES two times a day  aspirin  chewable 81 milliGRAM(s) Oral daily  budesonide 160 MICROgram(s)/formoterol 4.5 MICROgram(s) Inhaler 2 Puff(s) Inhalation two times a day  enoxaparin Injectable 40 milliGRAM(s) SubCutaneous every 24 hours  furosemide    Tablet 40 milliGRAM(s) Oral daily  insulin lispro (ADMELOG) corrective regimen sliding scale   SubCutaneous three times a day before meals  insulin lispro (ADMELOG) corrective regimen sliding scale   SubCutaneous at bedtime  lactulose Syrup 20 Gram(s) Oral two times a day  levothyroxine 150 MICROGram(s) Oral daily  OLANZapine 10 milliGRAM(s) Oral daily  potassium chloride    Tablet ER 40 milliEquivalent(s) Oral once      MEDICATIONS  (PRN):  acetaminophen     Tablet .. 650 milliGRAM(s) Oral every 6 hours PRN Temp greater or equal to 38C (100.4F), Mild Pain (1 - 3)  traMADol 25 milliGRAM(s) Oral every 6 hours PRN Moderate Pain (4 - 6)  traMADol 50 milliGRAM(s) Oral every 6 hours PRN Severe Pain (7 - 10)      REVIEW OF SYSTEMS:                           ALL ROS DONE [ X   ]    CONSTITUTIONAL:  LETHARGIC [   ], FEVER [   ], UNRESPONSIVE [   ]  CVS:  CP  [   ], SOB, [   ], PALPITATIONS [   ], DIZZYNESS [   ]  RS: COUGH [   ], SPUTUM [   ]  GI: ABDOMINAL PAIN [   ], NAUSEA [   ], VOMITINGS [   ], DIARRHEA [   ], CONSTIPATION [   ]  :  DYSURIA [   ], NOCTURIA [   ], INCREASED FREQUENCY [   ], DRIBLING [   ],  SKELETAL: PAINFUL JOINTS [   ], SWOLLEN JOINTS [   ], NECK ACHE [   ], LOW BACK ACHE [   ],  SKIN : ULCERS [   ], RASH [   ], ITCHING [   ]  CNS: HEAD ACHE [   ], DOUBLE VISION [   ], BLURRED VISION [   ], AMS / CONFUSION [   ], SEIZURES [   ], WEAKNESS [   ],TINGLING / NUMBNESS [   ]      PHYSICAL EXAMINATION:  GENERAL APPEARANCE: NO DISTRESS  HEENT:  NO PALLOR, NO  JVD,  NO   NODES, NECK SUPPLE    ;   BRUISING ON FACE +  CVS: S1 +, S2 +,   RS: AEEB,  OCCASIONAL  RALES +,   NO RONCHI  ABD: SOFT, NT, NO, BS +  EXT: NO PE  SKIN: WARM,   SKELETAL:  ROM ACCEPTABLE  CNS:  AAO X 2-3    RADIOLOGY :    REVIEWED    ASSESSMENT :     Chronic obstructive pulmonary disease with acute exacerbation    HTN (hypertension)    Schizo affective schizophrenia    Bipolar 1 disorder    Hypothyroidism    Arthritis    Hypercholesteremia    Chronic obstructive pulmonary disease, unspecified COPD type    Chronic diastolic congestive heart failure    Interstitial lung disease    Poor historian    No significant past surgical history        PLAN:  HPI:  77 year old F with PMH of HTN, hypothyroidism, CHF, COPD, bipolar d/o, schizoaffective d/o, DM presents after fall. Pt reports she fell from her wheelchair, denies LOC but reports head trauma and left lower leg trauma and is now pain free. Denies pain, fever, chills, shortness of breath, cough, wheezing, chest pain, lightheadedness, dizziness, n/v/d, changes in urinary or bowel habits.    ED Course:  Vitals: /69 P 120 R 18 T 98.2F > 102.2F  SpO2 97% RA  Meds: unasyn, solumedrol 125 mg IV, duonebs x 2 (24 Dec 2022 03:22)    # PATIENT IS PENDING LICO PLACEMENT    # SEPSIS S/T RLE CELLULITIS IMPROVED S/P ABX    # S/P MECHANICAL FALL W/ SCALP/FACE  HEMATOMA - S/P PT EVAL    # COPD EXACERBATION S/P STEROID COURSE, ON ALBUTEROL AND SYMBICORT - PULMONOLOGY EVALUATION IN PROGRESS    # CHRONIC DIASTOLIC CHF - ON LASIX, STRICT IS AND OS    # HYPOTHYROIDISM  # LOW TFTS  - LEVOTHYROXINE DOSE ADJUSTED   - F/U TFTS IN 4-6 WEEKS    # GI AND DVT PPX   Patient is a 77y old  Female who presents with a chief complaint of Fall (02 Jan 2023 17:26)    PATIENT IS SEEN AND EXAMINED IN MEDICAL FLOOR.    ALLERGIES:  Avelox (Unknown)  moxifloxacin (Unknown)    VITALS:    Vital Signs Last 24 Hrs  T(C): 36.6 (03 Jan 2023 04:42), Max: 37.1 (02 Jan 2023 15:10)  T(F): 97.8 (03 Jan 2023 04:42), Max: 98.8 (02 Jan 2023 15:10)  HR: 92 (03 Jan 2023 04:42) (92 - 96)  BP: 127/62 (03 Jan 2023 04:42) (112/64 - 127/62)  BP(mean): --  RR: 20 (03 Jan 2023 04:42) (20 - 20)  SpO2: 99% (03 Jan 2023 04:42) (94% - 99%)    Parameters below as of 03 Jan 2023 04:42  Patient On (Oxygen Delivery Method): nasal cannula  O2 Flow (L/min): 2      LABS:    CBC Full  -  ( 03 Jan 2023 07:31 )  WBC Count : 12.40 K/uL  RBC Count : 4.77 M/uL  Hemoglobin : 12.2 g/dL  Hematocrit : 39.9 %  Platelet Count - Automated : 277 K/uL  Mean Cell Volume : 83.6 fl  Mean Cell Hemoglobin : 25.6 pg  Mean Cell Hemoglobin Concentration : 30.6 gm/dL  Auto Neutrophil # : x  Auto Lymphocyte # : x  Auto Monocyte # : x  Auto Eosinophil # : x  Auto Basophil # : x  Auto Neutrophil % : x  Auto Lymphocyte % : x  Auto Monocyte % : x  Auto Eosinophil % : x  Auto Basophil % : x      01-03    141  |  101  |  30<H>  ----------------------------<  175<H>  3.2<L>   |  30  |  0.81    Ca    9.3      03 Jan 2023 07:31      CAPILLARY BLOOD GLUCOSE      POCT Blood Glucose.: 156 mg/dL (03 Jan 2023 07:40)  POCT Blood Glucose.: 116 mg/dL (02 Jan 2023 21:59)  POCT Blood Glucose.: 140 mg/dL (02 Jan 2023 16:53)  POCT Blood Glucose.: 122 mg/dL (02 Jan 2023 11:38)          Creatinine Trend: 0.81<--, 0.63<--, 0.76<--, 0.74<--, 0.63<--, 0.66<--  I&O's Summary          Clean Catch Clean Catch (Midstream)  12-24 @ 04:10   No growth  --  --      .Blood Blood-Peripheral  12-23 @ 22:22   No Growth Final  --  --      .Blood Blood-Peripheral  12-23 @ 22:00   No Growth Final  --  --          MEDICATIONS:    MEDICATIONS  (STANDING):  albuterol    90 MICROgram(s) HFA Inhaler 2 Puff(s) Inhalation every 6 hours  amLODIPine   Tablet 10 milliGRAM(s) Oral daily  artificial tears (preservative free) Ophthalmic Solution 1 Drop(s) Both EYES two times a day  aspirin  chewable 81 milliGRAM(s) Oral daily  budesonide 160 MICROgram(s)/formoterol 4.5 MICROgram(s) Inhaler 2 Puff(s) Inhalation two times a day  enoxaparin Injectable 40 milliGRAM(s) SubCutaneous every 24 hours  furosemide    Tablet 40 milliGRAM(s) Oral daily  insulin lispro (ADMELOG) corrective regimen sliding scale   SubCutaneous three times a day before meals  insulin lispro (ADMELOG) corrective regimen sliding scale   SubCutaneous at bedtime  lactulose Syrup 20 Gram(s) Oral two times a day  levothyroxine 150 MICROGram(s) Oral daily  OLANZapine 10 milliGRAM(s) Oral daily  potassium chloride    Tablet ER 40 milliEquivalent(s) Oral once      MEDICATIONS  (PRN):  acetaminophen     Tablet .. 650 milliGRAM(s) Oral every 6 hours PRN Temp greater or equal to 38C (100.4F), Mild Pain (1 - 3)  traMADol 25 milliGRAM(s) Oral every 6 hours PRN Moderate Pain (4 - 6)  traMADol 50 milliGRAM(s) Oral every 6 hours PRN Severe Pain (7 - 10)      REVIEW OF SYSTEMS:                           ALL ROS DONE [ X   ]    CONSTITUTIONAL:  LETHARGIC [   ], FEVER [   ], UNRESPONSIVE [   ]  CVS:  CP  [   ], SOB, [   ], PALPITATIONS [   ], DIZZYNESS [   ]  RS: COUGH [   ], SPUTUM [   ]  GI: ABDOMINAL PAIN [   ], NAUSEA [   ], VOMITINGS [   ], DIARRHEA [   ], CONSTIPATION [   ]  :  DYSURIA [   ], NOCTURIA [   ], INCREASED FREQUENCY [   ], DRIBLING [   ],  SKELETAL: PAINFUL JOINTS [   ], SWOLLEN JOINTS [   ], NECK ACHE [   ], LOW BACK ACHE [   ],  SKIN : ULCERS [   ], RASH [   ], ITCHING [   ]  CNS: HEAD ACHE [   ], DOUBLE VISION [   ], BLURRED VISION [   ], AMS / CONFUSION [   ], SEIZURES [   ], WEAKNESS [   ],TINGLING / NUMBNESS [   ]      PHYSICAL EXAMINATION:  GENERAL APPEARANCE: NO DISTRESS  HEENT:  NO PALLOR, NO  JVD,  NO   NODES, NECK SUPPLE    ;   BRUISING ON FACE +  CVS: S1 +, S2 +,   RS: AEEB,  OCCASIONAL  RALES +,   NO RONCHI  ABD: SOFT, NT, NO, BS +  EXT: NO PE  SKIN: WARM,   SKELETAL:  ROM ACCEPTABLE  CNS:  AAO X 2-3    RADIOLOGY :    REVIEWED    ASSESSMENT :     Chronic obstructive pulmonary disease with acute exacerbation    HTN (hypertension)    Schizo affective schizophrenia    Bipolar 1 disorder    Hypothyroidism    Arthritis    Hypercholesteremia    Chronic obstructive pulmonary disease, unspecified COPD type    Chronic diastolic congestive heart failure    Interstitial lung disease    Poor historian    No significant past surgical history        PLAN:  HPI:  77 year old F with PMH of HTN, hypothyroidism, CHF, COPD, bipolar d/o, schizoaffective d/o, DM presents after fall. Pt reports she fell from her wheelchair, denies LOC but reports head trauma and left lower leg trauma and is now pain free. Denies pain, fever, chills, shortness of breath, cough, wheezing, chest pain, lightheadedness, dizziness, n/v/d, changes in urinary or bowel habits.    ED Course:  Vitals: /69 P 120 R 18 T 98.2F > 102.2F  SpO2 97% RA  Meds: unasyn, solumedrol 125 mg IV, duonebs x 2 (24 Dec 2022 03:22)    # PATIENT IS PENDING LICO PLACEMENT    # SEPSIS S/T RLE CELLULITIS IMPROVED S/P ABX    # S/P MECHANICAL FALL W/ SCALP/FACE  HEMATOMA - S/P PT EVAL    # COPD EXACERBATION S/P STEROID COURSE, ON ALBUTEROL AND SYMBICORT - PULMONOLOGY EVALUATION IN PROGRESS    # CHRONIC DIASTOLIC CHF - ON LASIX, STRICT IS AND OS    # HYPOTHYROIDISM  # LOW TFTS  - LEVOTHYROXINE DOSE ADJUSTED   - F/U TFTS IN 4-6 WEEKS    # HYPOKALEMIA  - REPLETING WITH SUPPLEMENT    # AMBULATORY DYSFUNCTION S/T PVD, NEUROPATHY  - PT EVAL NOTED    # GI AND DVT PPX   Patient is a 77y old  Female who presents with a chief complaint of Fall (02 Jan 2023 17:26)    PATIENT IS SEEN AND EXAMINED IN MEDICAL FLOOR.    ALLERGIES:  Avelox (Unknown)  moxifloxacin (Unknown)    VITALS:    Vital Signs Last 24 Hrs  T(C): 36.6 (03 Jan 2023 04:42), Max: 37.1 (02 Jan 2023 15:10)  T(F): 97.8 (03 Jan 2023 04:42), Max: 98.8 (02 Jan 2023 15:10)  HR: 92 (03 Jan 2023 04:42) (92 - 96)  BP: 127/62 (03 Jan 2023 04:42) (112/64 - 127/62)  BP(mean): --  RR: 20 (03 Jan 2023 04:42) (20 - 20)  SpO2: 99% (03 Jan 2023 04:42) (94% - 99%)    Parameters below as of 03 Jan 2023 04:42  Patient On (Oxygen Delivery Method): nasal cannula  O2 Flow (L/min): 2      LABS:    CBC Full  -  ( 03 Jan 2023 07:31 )  WBC Count : 12.40 K/uL  RBC Count : 4.77 M/uL  Hemoglobin : 12.2 g/dL  Hematocrit : 39.9 %  Platelet Count - Automated : 277 K/uL  Mean Cell Volume : 83.6 fl  Mean Cell Hemoglobin : 25.6 pg  Mean Cell Hemoglobin Concentration : 30.6 gm/dL  Auto Neutrophil # : x  Auto Lymphocyte # : x  Auto Monocyte # : x  Auto Eosinophil # : x  Auto Basophil # : x  Auto Neutrophil % : x  Auto Lymphocyte % : x  Auto Monocyte % : x  Auto Eosinophil % : x  Auto Basophil % : x      01-03    141  |  101  |  30<H>  ----------------------------<  175<H>  3.2<L>   |  30  |  0.81    Ca    9.3      03 Jan 2023 07:31      CAPILLARY BLOOD GLUCOSE      POCT Blood Glucose.: 156 mg/dL (03 Jan 2023 07:40)  POCT Blood Glucose.: 116 mg/dL (02 Jan 2023 21:59)  POCT Blood Glucose.: 140 mg/dL (02 Jan 2023 16:53)  POCT Blood Glucose.: 122 mg/dL (02 Jan 2023 11:38)          Creatinine Trend: 0.81<--, 0.63<--, 0.76<--, 0.74<--, 0.63<--, 0.66<--  I&O's Summary          Clean Catch Clean Catch (Midstream)  12-24 @ 04:10   No growth  --  --      .Blood Blood-Peripheral  12-23 @ 22:22   No Growth Final  --  --      .Blood Blood-Peripheral  12-23 @ 22:00   No Growth Final  --  --          MEDICATIONS:    MEDICATIONS  (STANDING):  albuterol    90 MICROgram(s) HFA Inhaler 2 Puff(s) Inhalation every 6 hours  amLODIPine   Tablet 10 milliGRAM(s) Oral daily  artificial tears (preservative free) Ophthalmic Solution 1 Drop(s) Both EYES two times a day  aspirin  chewable 81 milliGRAM(s) Oral daily  budesonide 160 MICROgram(s)/formoterol 4.5 MICROgram(s) Inhaler 2 Puff(s) Inhalation two times a day  enoxaparin Injectable 40 milliGRAM(s) SubCutaneous every 24 hours  furosemide    Tablet 40 milliGRAM(s) Oral daily  insulin lispro (ADMELOG) corrective regimen sliding scale   SubCutaneous three times a day before meals  insulin lispro (ADMELOG) corrective regimen sliding scale   SubCutaneous at bedtime  lactulose Syrup 20 Gram(s) Oral two times a day  levothyroxine 150 MICROGram(s) Oral daily  OLANZapine 10 milliGRAM(s) Oral daily  potassium chloride    Tablet ER 40 milliEquivalent(s) Oral once      MEDICATIONS  (PRN):  acetaminophen     Tablet .. 650 milliGRAM(s) Oral every 6 hours PRN Temp greater or equal to 38C (100.4F), Mild Pain (1 - 3)  traMADol 25 milliGRAM(s) Oral every 6 hours PRN Moderate Pain (4 - 6)  traMADol 50 milliGRAM(s) Oral every 6 hours PRN Severe Pain (7 - 10)      REVIEW OF SYSTEMS:                           ALL ROS DONE [ X   ]    CONSTITUTIONAL:  LETHARGIC [   ], FEVER [   ], UNRESPONSIVE [   ]  CVS:  CP  [   ], SOB, [   ], PALPITATIONS [   ], DIZZYNESS [   ]  RS: COUGH [   ], SPUTUM [   ]  GI: ABDOMINAL PAIN [   ], NAUSEA [   ], VOMITINGS [   ], DIARRHEA [   ], CONSTIPATION [   ]  :  DYSURIA [   ], NOCTURIA [   ], INCREASED FREQUENCY [   ], DRIBLING [   ],  SKELETAL: PAINFUL JOINTS [   ], SWOLLEN JOINTS [   ], NECK ACHE [   ], LOW BACK ACHE [   ],  SKIN : ULCERS [   ], RASH [   ], ITCHING [   ]  CNS: HEAD ACHE [   ], DOUBLE VISION [   ], BLURRED VISION [   ], AMS / CONFUSION [   ], SEIZURES [   ], WEAKNESS [   ],TINGLING / NUMBNESS [   ]      PHYSICAL EXAMINATION:  GENERAL APPEARANCE: NO DISTRESS  HEENT:  NO PALLOR, NO  JVD,  NO   NODES, NECK SUPPLE    ;   BRUISING ON FACE +  CVS: S1 +, S2 +,   RS: AEEB,  OCCASIONAL  RALES +,   NO RONCHI  ABD: SOFT, NT, NO, BS +  EXT: NO PE  SKIN: WARM,   SKELETAL:  ROM ACCEPTABLE  CNS:  AAO X 2-3    RADIOLOGY :    REVIEWED    ASSESSMENT :     Chronic obstructive pulmonary disease with acute exacerbation    HTN (hypertension)    Schizo affective schizophrenia    Bipolar 1 disorder    Hypothyroidism    Arthritis    Hypercholesteremia    Chronic obstructive pulmonary disease, unspecified COPD type    Chronic diastolic congestive heart failure    Interstitial lung disease    Poor historian    No significant past surgical history        PLAN:  HPI:  77 year old F with PMH of HTN, hypothyroidism, CHF, COPD, bipolar d/o, schizoaffective d/o, DM presents after fall. Pt reports she fell from her wheelchair, denies LOC but reports head trauma and left lower leg trauma and is now pain free. Denies pain, fever, chills, shortness of breath, cough, wheezing, chest pain, lightheadedness, dizziness, n/v/d, changes in urinary or bowel habits.    ED Course:  Vitals: /69 P 120 R 18 T 98.2F > 102.2F  SpO2 97% RA  Meds: unasyn, solumedrol 125 mg IV, duonebs x 2 (24 Dec 2022 03:22)    # PATIENT IS PENDING LICO PLACEMENT    # SEPSIS S/T RLE CELLULITIS IMPROVED S/P ABX    # S/P MECHANICAL FALL W/ SCALP/FACE  HEMATOMA - S/P PT EVAL    # COPD EXACERBATION S/P STEROID COURSE, ON ALBUTEROL AND SYMBICORT - PULMONOLOGY EVALUATION IN PROGRESS    # CHRONIC DIASTOLIC CHF - ON LASIX, STRICT IS AND OS    # HYPOTHYROIDISM  # LOW TFTS  - LEVOTHYROXINE DOSE ADJUSTED   - F/U TFTS IN 4-6 WEEKS  - NOTED ENDOCRINOLOGY CONSULT    # HYPOKALEMIA  - REPLETING WITH SUPPLEMENT    # AMBULATORY DYSFUNCTION S/T PVD, NEUROPATHY  - PT EVAL NOTED    # GI AND DVT PPX

## 2023-01-03 NOTE — PROGRESS NOTE ADULT - PROBLEM SELECTOR PLAN 8
- TSH 0.18  - decreased home dose of Levothyroxine to 150mcg per Endo reccs  Check TSH, Free T4, Total T3 on 6th Jan on Friday, if patient remains in the hospital or outpatient.   Check TPO antibody to rule out Hashimoto's disease  - Endo Dr. England following

## 2023-01-03 NOTE — PROGRESS NOTE ADULT - TIME BILLING
reviewing records/charts/labs, interview and physical examination, coordination of care with patient and primary team, and documenting clinical information.
- Review of records, telemetry, vital signs and daily labs.   - General and cardiovascular physical examination.  - Generation of cardiovascular treatment plan.  - Coordination of care.      Patient was seen and examined by me on 12/30/22,interim events noted,labs and radiology studies reviewed.  Gee Choi MD,FACC.  [address] [place], [place] [id number].  [phone]

## 2023-01-03 NOTE — PROGRESS NOTE ADULT - PROBLEM SELECTOR PLAN 10
- PT reccs Raghav  - family refusing for patient to return to [facility]  - would like to go to [facility]  - pending acceptance  - CM following

## 2023-01-03 NOTE — CHART NOTE - NSCHARTNOTEFT_GEN_A_CORE
Assessment:   77yFemalePatient is a 77y old  Female who presents with a chief complaint of Fall (03 Jan 2023 11:56) Pt Visited. Pt asleep. On O2. Po tolerated. Labs Noted Endo Notes Noted. D/W RN Pt eating good.       Factors impacting intake: [ ] none [ ] nausea  [ ] vomiting [ ] diarrhea [ ] constipation  [ ]chewing problems [ ] swallowing issues  [ ] other:     Diet Prescription: Diet, Regular:   Consistent Carbohydrate {No Snacks}  DASH/ TLC {Sodium & Cholesterol Restricted} (12-23-22 @ 23:27)    Intake:  ~75 - 100 % of meal.    Current Weight:   % Weight Change Bed scale 195 LB     Pertinent Medications: MEDICATIONS  (STANDING):  albuterol    90 MICROgram(s) HFA Inhaler 2 Puff(s) Inhalation every 6 hours  amLODIPine   Tablet 10 milliGRAM(s) Oral daily  artificial tears (preservative free) Ophthalmic Solution 1 Drop(s) Both EYES two times a day  aspirin  chewable 81 milliGRAM(s) Oral daily  budesonide 160 MICROgram(s)/formoterol 4.5 MICROgram(s) Inhaler 2 Puff(s) Inhalation two times a day  enoxaparin Injectable 40 milliGRAM(s) SubCutaneous every 24 hours  furosemide    Tablet 40 milliGRAM(s) Oral daily  insulin lispro (ADMELOG) corrective regimen sliding scale   SubCutaneous three times a day before meals  insulin lispro (ADMELOG) corrective regimen sliding scale   SubCutaneous at bedtime  lactulose Syrup 20 Gram(s) Oral two times a day  levothyroxine 150 MICROGram(s) Oral daily  OLANZapine 10 milliGRAM(s) Oral daily    MEDICATIONS  (PRN):  acetaminophen     Tablet .. 650 milliGRAM(s) Oral every 6 hours PRN Temp greater or equal to 38C (100.4F), Mild Pain (1 - 3)  traMADol 25 milliGRAM(s) Oral every 6 hours PRN Moderate Pain (4 - 6)  traMADol 50 milliGRAM(s) Oral every 6 hours PRN Severe Pain (7 - 10)    Pertinent Labs: 01-03 Na141 mmol/L Glu 175 mg/dL<H> K+ 3.2 mmol/L<L> Cr  0.81 mg/dL BUN 30 mg/dL<H> 12-30 Phos 3.7 mg/dL 12-30 Alb 2.5 g/dL<L> 12-24 Chol 133 mg/dL LDL --    HDL 50 mg/dL<L> Trig 50 mg/dL     CAPILLARY BLOOD GLUCOSE      POCT Blood Glucose.: 172 mg/dL (03 Jan 2023 11:23)  POCT Blood Glucose.: 156 mg/dL (03 Jan 2023 07:40)  POCT Blood Glucose.: 116 mg/dL (02 Jan 2023 21:59)  POCT Blood Glucose.: 140 mg/dL (02 Jan 2023 16:53)    Skin:     Estimated Needs:   [ ] no change since previous assessment  [ ] recalculated:     Previous Nutrition Diagnosis:   [ ] Inadequate Energy Intake [ ]Inadequate Oral Intake [ ] Excessive Energy Intake   [ ] Underweight [ ] Increased Nutrient Needs [ ] Overweight/Obesity   [ ] Altered GI Function [ ] Unintended Weight Loss [ ] Food & Nutrition Related Knowledge Deficit [x ] Malnutrition  Moderate     Nutrition Diagnosis is [ ] ongoing  [ ] resolved [ ] not applicable     New Nutrition Diagnosis: [ ] not applicable       Interventions:   Recommend  [ ] Change Diet To:  [ ] Nutrition Supplement  [ ] Nutrition Support  [ x] Other: Continue with Current diet Rx.     Monitoring and Evaluation:   [ ] PO intake [ x ] Tolerance to diet prescription [ x ] weights [ x ] labs[ x ] follow up per protocol  [ ] other:

## 2023-01-03 NOTE — PROGRESS NOTE ADULT - PROBLEM SELECTOR PLAN 9
- Continue zyprexa 10mg daily.  - Hold home dose of ativan (0.5mg HS)  for now  - Maintain safety measures.

## 2023-01-03 NOTE — PROGRESS NOTE ADULT - ASSESSMENT
77 year old F with PMH of HTN, hypothyroidism, CHF, COPD, bipolar d/o, schizoaffective d/o, DM presents after fall. Pt reports she fell from her wheelchair, denies LOC but reports head trauma and left lower leg trauma. In the ED found to have fever and tachycardia. Initially admitted to  for Sepsis 2/2 cellulitis and COPD Exacerbation. CXR shows mild patchy opacification the lungs. CTH shows +Hematome forehead. No bleeding or midline shift noted, started on IV Unasyn and IV steroids. Urine cx, Blood Cx remained NGTD. Cellulitis more likely venous stasis, IV Unasyn d/c'd, respiratory symptoms also improved and transitioned to oral steroids. Patient was downgraded to medicine service on  12/26. PT reccs [facility], family refusing ECC would like placement at [facility]  following.

## 2023-01-03 NOTE — PROGRESS NOTE ADULT - SUBJECTIVE AND OBJECTIVE BOX
Subjective:  Chart Notes, Work list Manager, and lab results reviewed. Patient is sleeping, denies any major complaints    Review of Systems:  Constitutional: No fever  Eyes: No blurry vision  Neuro: No tremors  HEENT: No pain  Cardiovascular: No chest pain, palpitations  Respiratory: No SOB, no cough  GI: No nausea, vomiting, abdominal pain    Medications (Standing):  albuterol    90 MICROgram(s) HFA Inhaler 2 Puff(s) Inhalation every 6 hours  amLODIPine   Tablet 10 milliGRAM(s) Oral daily  artificial tears (preservative free) Ophthalmic Solution 1 Drop(s) Both EYES two times a day  aspirin  chewable 81 milliGRAM(s) Oral daily  budesonide 160 MICROgram(s)/formoterol 4.5 MICROgram(s) Inhaler 2 Puff(s) Inhalation two times a day  enoxaparin Injectable 40 milliGRAM(s) SubCutaneous every 24 hours  furosemide    Tablet 40 milliGRAM(s) Oral daily  insulin lispro (ADMELOG) corrective regimen sliding scale   SubCutaneous three times a day before meals  insulin lispro (ADMELOG) corrective regimen sliding scale   SubCutaneous at bedtime  lactulose Syrup 20 Gram(s) Oral two times a day  levothyroxine 150 MICROGram(s) Oral daily  OLANZapine 10 milliGRAM(s) Oral daily    Medications (PRN):  acetaminophen     Tablet .. 650 milliGRAM(s) Oral every 6 hours PRN Temp greater or equal to 38C (100.4F), Mild Pain (1 - 3)  traMADol 25 milliGRAM(s) Oral every 6 hours PRN Moderate Pain (4 - 6)  traMADol 50 milliGRAM(s) Oral every 6 hours PRN Severe Pain (7 - 10)    Physical Examination  VITALS: T(C): 36.6 (01-03-23 @ 04:42)  T(F): 97.8 (01-03-23 @ 04:42), Max: 98.8 (01-02-23 @ 15:10)  HR: 92 (01-03-23 @ 04:42) (92 - 96)  BP: 127/62 (01-03-23 @ 04:42) (112/64 - 127/62)  RR:  (20 - 20)  SpO2:  (94% - 99%)    Constitutional: No acute distress, yes ill- appearing, obese  HEENT: Moist mucous membranes  Neck:  No JVD, bruits or thyromegaly, No thyroid nodules palpable, no LAD  Respiratory:  Respiratory effort normal, lungs clear to ausculation, without rales or rhonchi  Cardiovascular:  Regular heart rate, normal S1 and S2 sounds, without murmur, rub or gallop.  Gastrointestinal: Soft, non tender without hepatosplenomegaly and masses, +ve abdominal obesity  Extremities: Sensation intact in feet, no cyanosis, yes b/l pedal edema, positive pedal pulses, DTR in legs wnl  Neurological:  Oriented to person, place and time,     Labs:  Capillary Blood glucose:  POCT Blood Glucose.: 172 mg/dL (03 Jan 2023 11:23)  POCT Blood Glucose.: 156 mg/dL (03 Jan 2023 07:40)  POCT Blood Glucose.: 116 mg/dL (02 Jan 2023 21:59)  POCT Blood Glucose.: 140 mg/dL (02 Jan 2023 16:53)    01-03  141  |  101  |  30<H>  ----------------------------<  175<H>  3.2<L>   |  30  |  0.81  eGFR: 76  Ca    9.3      01-03      Thyroid Stimulating Hormone, Serum: 0.18 uU/mL (12.28.22 @ 08:55)  T4, Serum: 13.5 ug/dL (12.28.22 @ 08:55)    Thyroid Stimulating Hormone, Serum: 0.82 uU/mL (10.27.18 @ 06:54)  Free Thyroxine, Serum: 1.8 ng/dL (12.29.22 @ 07:30)    Assessment and Plan:  77 year old F with PMH of HTN, hypothyroidism, CHF, COPD, bipolar d/o, schizoaffective d/o, DM presents after fall. Pt reports she fell from her wheelchair. Admitted for sepsis and fall. Endocrinology is following  for abnormal TFTs    1) Abnormal TSH  2) Hx of primary hypothyroidism    Patient has most likely Hashimoto's thyroiditis.   Per prescription hx patient was on 200mcg of LT4 ( Higher then her weight based dose)  TSH levels was suppressed to 0.18 on 28th Dec 2022 with no hx of illness prior to hospital admission, it could be due to multiple doses of steroids patient has received prior to TFTs blood test.  Per chart review, patient had normal TSH in 2018.    Recommendations:  Continue Levothyroxine to 150 mcg daily. Please give in morning, in an empty stomach and wait for 30 minutes to give breakfast  Check TSH, Free T4, Total T3 on 6th Jan on Friday, if patient remains in the hospital or outpatient.   Check TPO antibody to rule out Hashimoto's disease  Counselled patient regarding the levothyroxine with appropriate technique      Discussed endocrine plan of care with patient and primary team       Swetha England MD   Endocrinology, Diabetes and Metabolism   Available on MS. teams

## 2023-01-03 NOTE — PROGRESS NOTE ADULT - SUBJECTIVE AND OBJECTIVE BOX
NP Note discussed with  Primary Attending    Patient is a 77y old  Female who presents with a chief complaint of Fall (03 Jan 2023 10:18)      INTERVAL HPI/OVERNIGHT EVENTS: no acute events overnight    MEDICATIONS  (STANDING):  albuterol    90 MICROgram(s) HFA Inhaler 2 Puff(s) Inhalation every 6 hours  amLODIPine   Tablet 10 milliGRAM(s) Oral daily  artificial tears (preservative free) Ophthalmic Solution 1 Drop(s) Both EYES two times a day  aspirin  chewable 81 milliGRAM(s) Oral daily  budesonide 160 MICROgram(s)/formoterol 4.5 MICROgram(s) Inhaler 2 Puff(s) Inhalation two times a day  enoxaparin Injectable 40 milliGRAM(s) SubCutaneous every 24 hours  furosemide    Tablet 40 milliGRAM(s) Oral daily  insulin lispro (ADMELOG) corrective regimen sliding scale   SubCutaneous three times a day before meals  insulin lispro (ADMELOG) corrective regimen sliding scale   SubCutaneous at bedtime  lactulose Syrup 20 Gram(s) Oral two times a day  levothyroxine 150 MICROGram(s) Oral daily  OLANZapine 10 milliGRAM(s) Oral daily    MEDICATIONS  (PRN):  acetaminophen     Tablet .. 650 milliGRAM(s) Oral every 6 hours PRN Temp greater or equal to 38C (100.4F), Mild Pain (1 - 3)  traMADol 25 milliGRAM(s) Oral every 6 hours PRN Moderate Pain (4 - 6)  traMADol 50 milliGRAM(s) Oral every 6 hours PRN Severe Pain (7 - 10)      __________________________________________________  REVIEW OF SYSTEMS:    CONSTITUTIONAL: No fever,   EYES: no acute visual disturbances  NECK: No pain or stiffness  RESPIRATORY: No cough; No shortness of breath  CARDIOVASCULAR: No chest pain, no palpitations  GASTROINTESTINAL: No pain. No nausea or vomiting; No diarrhea   NEUROLOGICAL: No headache or numbness, no tremors  MUSCULOSKELETAL: No joint pain, no muscle pain  GENITOURINARY: no dysuria, no frequency, no hesitancy  PSYCHIATRY: no depression , no anxiety  ALL OTHER  ROS negative        Vital Signs Last 24 Hrs  T(C): 36.7 (03 Jan 2023 11:49), Max: 37.1 (02 Jan 2023 15:10)  T(F): 98.1 (03 Jan 2023 11:49), Max: 98.8 (02 Jan 2023 15:10)  HR: 87 (03 Jan 2023 11:49) (87 - 96)  BP: 121/55 (03 Jan 2023 11:49) (112/64 - 127/62)  BP(mean): --  RR: 18 (03 Jan 2023 11:49) (18 - 20)  SpO2: 97% (03 Jan 2023 11:49) (94% - 99%)    Parameters below as of 03 Jan 2023 11:49  Patient On (Oxygen Delivery Method): nasal cannula  O2 Flow (L/min): 2      ________________________________________________  PHYSICAL EXAM:  GENERAL: NAD  HEENT: Normocephalic;  conjunctivae and sclerae clear; Ecchymosis noted Left eye  NECK : supple  CHEST/LUNG: Clear to auscultation bilaterally  HEART: S1 S2  regular  ABDOMEN: Soft, Nontender, Nondistended; Bowel sounds present  EXTREMITIES: no cyanosis; trace edema to b/l LE  SKIN: warm and dry; no rash  NERVOUS SYSTEM:  Awake and alert; Oriented  to place, person    _________________________________________________  LABS:                        12.2   12.40 )-----------( 277      ( 03 Jan 2023 07:31 )             39.9     01-03    141  |  101  |  30<H>  ----------------------------<  175<H>  3.2<L>   |  30  |  0.81    Ca    9.3      03 Jan 2023 07:31          CAPILLARY BLOOD GLUCOSE      POCT Blood Glucose.: 172 mg/dL (03 Jan 2023 11:23)  POCT Blood Glucose.: 156 mg/dL (03 Jan 2023 07:40)  POCT Blood Glucose.: 116 mg/dL (02 Jan 2023 21:59)  POCT Blood Glucose.: 140 mg/dL (02 Jan 2023 16:53)        RADIOLOGY & ADDITIONAL TESTS:  < from: Xray Hip 2-3 Views, Left (12.28.22 @ 10:37) >  IMPRESSION:  Sequelae of chronic left neck fracture-dislocation at the left hip.  No clear-cut new fracture seen.    < end of copied text >    < from: CT Head No Cont (12.23.22 @ 21:19) >    IMPRESSION: No intracranial hemorrhage or mass effect. Left   frontotemporal scalp hematoma.    < end of copied text >      < from: Xray Chest 1 View-PORTABLE IMMEDIATE (12.23.22 @ 18:52) >  IMPRESSION: There is mild patchy opacification the lungs appears   compatible with mild pulmonary edema. The heart is normal in size. There   are severe degenerative changes of the shoulders bilaterally.    < end of copied text >    Imaging Personally Reviewed:  YES  Consultant(s) Notes Reviewed:   YES    Care Discussed with Consultants :       Plan of care was discussed with patient and /or primary care giver; all questions and concerns were addressed and care was aligned with patient's wishes.

## 2023-01-04 LAB
GLUCOSE BLDC GLUCOMTR-MCNC: 119 MG/DL — HIGH (ref 70–99)
GLUCOSE BLDC GLUCOMTR-MCNC: 144 MG/DL — HIGH (ref 70–99)
GLUCOSE BLDC GLUCOMTR-MCNC: 154 MG/DL — HIGH (ref 70–99)
GLUCOSE BLDC GLUCOMTR-MCNC: 192 MG/DL — HIGH (ref 70–99)
THYROPEROXIDASE AB SERPL-ACNC: 22.4 IU/ML — SIGNIFICANT CHANGE UP

## 2023-01-04 RX ADMIN — ALBUTEROL 2 PUFF(S): 90 AEROSOL, METERED ORAL at 15:29

## 2023-01-04 RX ADMIN — BUDESONIDE AND FORMOTEROL FUMARATE DIHYDRATE 2 PUFF(S): 160; 4.5 AEROSOL RESPIRATORY (INHALATION) at 09:42

## 2023-01-04 RX ADMIN — BUDESONIDE AND FORMOTEROL FUMARATE DIHYDRATE 2 PUFF(S): 160; 4.5 AEROSOL RESPIRATORY (INHALATION) at 21:53

## 2023-01-04 RX ADMIN — ALBUTEROL 2 PUFF(S): 90 AEROSOL, METERED ORAL at 21:53

## 2023-01-04 RX ADMIN — Medication 1: at 11:48

## 2023-01-04 RX ADMIN — OLANZAPINE 10 MILLIGRAM(S): 15 TABLET, FILM COATED ORAL at 12:54

## 2023-01-04 RX ADMIN — Medication 1 DROP(S): at 05:46

## 2023-01-04 RX ADMIN — Medication 150 MICROGRAM(S): at 05:45

## 2023-01-04 RX ADMIN — Medication 81 MILLIGRAM(S): at 11:47

## 2023-01-04 RX ADMIN — ENOXAPARIN SODIUM 40 MILLIGRAM(S): 100 INJECTION SUBCUTANEOUS at 05:46

## 2023-01-04 RX ADMIN — Medication 40 MILLIGRAM(S): at 05:45

## 2023-01-04 RX ADMIN — AMLODIPINE BESYLATE 10 MILLIGRAM(S): 2.5 TABLET ORAL at 05:45

## 2023-01-04 RX ADMIN — ALBUTEROL 2 PUFF(S): 90 AEROSOL, METERED ORAL at 09:41

## 2023-01-04 NOTE — PROGRESS NOTE ADULT - PROBLEM SELECTOR PLAN 4
Known COPD  Uses Breo and albuterol at facility.  Does not use oxygen or BIPAP  Presented with significant wheezing in ED  CXR shows mild patchy opacification the lungs appears compatible with mild pulmonary edema.     Continue bronchodilators and ICS  Continue oxygen support. Currently tolerating 2LPM. Monitor oxygen saturation  Continue solumedrol 40mg every 8 hours.  Titrate Oxygen as needed.
Continue amlodipine and lasix.  Statin, ASA,
Not acute exacerbation  Continue amlodipine and lasix.  c/w Statin, ASA,
- Not acute exacerbation  - Continue amlodipine and home dose lasix.  - c/w Statin, ASA,
- GOLD 4C  - Uses Breo and albuterol at facility.  - Does not use oxygen or BIPAP  - Presented with significant wheezing in ED  - CXR shows mild patchy opacification the lungs appears compatible with mild pulmonary edema.     - Continue bronchodilators and ICS  - Continue oxygen support. Currently tolerating 2LPM. Monitor oxygen saturation. Titrate oxygen as tolerated.  - completed Prednisone taper

## 2023-01-04 NOTE — PROGRESS NOTE ADULT - PROBLEM SELECTOR PROBLEM 4
COPD exacerbation
Chronic CHF
COPD exacerbation
Chronic CHF

## 2023-01-04 NOTE — PROGRESS NOTE ADULT - PROBLEM SELECTOR PLAN 2
- S/P mechanical fall at NH  - +trauma/hematoma to forehead. Denying any pain  - CT +Hematome forehead. No bleeding or midline shift noted on CT scan  - Monitor size of hematoma  - C/O left hip pain. Left hip xray No acute fractures noted +Chronic hip fractures (compared to previous)  - Tylenol only for pain  - Maintain fall and safety measures  - PT reccs [facility]

## 2023-01-04 NOTE — PROGRESS NOTE ADULT - PROBLEM SELECTOR PLAN 9
- PT reccs [facility]  - family refused going back to [facility] initially, now agreeable to go back   - unable to contact to emergency contact earlier today - SW following   - d/c to [facility] at 11am

## 2023-01-04 NOTE — PROGRESS NOTE ADULT - SUBJECTIVE AND OBJECTIVE BOX
Time of Visit:  Patient seen and examined. laying in bed comfortable     MEDICATIONS  (STANDING):  albuterol    90 MICROgram(s) HFA Inhaler 2 Puff(s) Inhalation every 6 hours  amLODIPine   Tablet 10 milliGRAM(s) Oral daily  artificial tears (preservative free) Ophthalmic Solution 1 Drop(s) Both EYES two times a day  aspirin  chewable 81 milliGRAM(s) Oral daily  budesonide 160 MICROgram(s)/formoterol 4.5 MICROgram(s) Inhaler 2 Puff(s) Inhalation two times a day  enoxaparin Injectable 40 milliGRAM(s) SubCutaneous every 24 hours  furosemide    Tablet 40 milliGRAM(s) Oral daily  insulin lispro (ADMELOG) corrective regimen sliding scale   SubCutaneous three times a day before meals  insulin lispro (ADMELOG) corrective regimen sliding scale   SubCutaneous at bedtime  lactulose Syrup 20 Gram(s) Oral two times a day  levothyroxine 150 MICROGram(s) Oral daily  OLANZapine 10 milliGRAM(s) Oral daily      MEDICATIONS  (PRN):  acetaminophen     Tablet .. 650 milliGRAM(s) Oral every 6 hours PRN Temp greater or equal to 38C (100.4F), Mild Pain (1 - 3)       Medications up to date at time of exam.      PHYSICAL EXAMINATION:  Patient has no new complaints.  GENERAL: The patient is a well-developed, well-nourished, in no apparent distress.     Vital Signs Last 24 Hrs  T(C): 36.4 (04 Jan 2023 11:58), Max: 36.6 (04 Jan 2023 05:13)  T(F): 97.5 (04 Jan 2023 11:58), Max: 97.9 (04 Jan 2023 05:13)  HR: 78 (04 Jan 2023 11:58) (78 - 92)  BP: 124/60 (04 Jan 2023 11:58) (122/59 - 124/61)  BP(mean): --  RR: 20 (04 Jan 2023 11:58) (20 - 20)  SpO2: 98% (04 Jan 2023 11:58) (93% - 98%)    Parameters below as of 04 Jan 2023 11:58  Patient On (Oxygen Delivery Method): nasal cannula  O2 Flow (L/min): 2     (if applicable)    Chest Tube (if applicable)    HEENT: Head is normocephalic and atraumatic. Extraocular muscles are intact. Mucous membranes are moist.  resolving left eye hematoma     NECK: Supple, no palpable adenopathy.    LUNGS: Clear to auscultation, no wheezing, rales, or rhonchi.    HEART: Regular rate and rhythm without murmur.    ABDOMEN: Soft, nontender, and nondistended.  No hepatosplenomegaly is noted.    EXTREMITIES: Without any cyanosis, clubbing, rash, lesions or edema.    NEUROLOGIC: Awake, alert,     SKIN: Warm, dry, good turgor.      LABS:                        12.2   12.40 )-----------( 277      ( 03 Jan 2023 07:31 )             39.9     01-03    141  |  101  |  30<H>  ----------------------------<  175<H>  3.2<L>   |  30  |  0.81    Ca    9.3      03 Jan 2023 07:31                          MICROBIOLOGY: (if applicable)    RADIOLOGY & ADDITIONAL STUDIES:  EKG:   CXR:  ECHO:    IMPRESSION: 77y Female PAST MEDICAL & SURGICAL HISTORY:  HTN (hypertension)      Schizo affective schizophrenia      Bipolar 1 disorder      Hypothyroidism      Arthritis      Hypercholesteremia      Chronic obstructive pulmonary disease, unspecified COPD type      Chronic diastolic congestive heart failure      Interstitial lung disease      Poor historian      No significant past surgical history       p/w       Impression: This is a 78 Y/O Female from [facility] .Presented to ED due to had mechanical fall from wheelchair with head trauma. CT shows left frontotemporal scalp hematoma. O2 saturation 86% room air due to Acute hypoxic respiratory failure secondary to COPD. CXR on 12-23-22 Mild Lung opacity due to chronic diastolic CHF. Negative swab on 12-23-22 for Covid 19. Negative RVP, Blood Cx x2 .  Admitted also with Sepsis due to LE Cellulitis.     - Completed course of antibx   - Continue Symbicort   - Duonebs q6h   - O2 supp as needed   - Skin care as per nursing to b/l LE   - Fall precaution   - Placement to facility pending

## 2023-01-04 NOTE — PROGRESS NOTE ADULT - PROBLEM SELECTOR PROBLEM 3
Cellulitis of both lower extremities
COPD exacerbation
COPD exacerbation
Fall
COPD exacerbation

## 2023-01-04 NOTE — PROGRESS NOTE ADULT - SUBJECTIVE AND OBJECTIVE BOX
NP Note discussed with  Primary Attending    Patient is a 77y old  Female who presents with a chief complaint of Fall (03 Jan 2023 11:56)      INTERVAL HPI/OVERNIGHT EVENTS: no new complaints    MEDICATIONS  (STANDING):  albuterol    90 MICROgram(s) HFA Inhaler 2 Puff(s) Inhalation every 6 hours  amLODIPine   Tablet 10 milliGRAM(s) Oral daily  artificial tears (preservative free) Ophthalmic Solution 1 Drop(s) Both EYES two times a day  aspirin  chewable 81 milliGRAM(s) Oral daily  budesonide 160 MICROgram(s)/formoterol 4.5 MICROgram(s) Inhaler 2 Puff(s) Inhalation two times a day  enoxaparin Injectable 40 milliGRAM(s) SubCutaneous every 24 hours  furosemide    Tablet 40 milliGRAM(s) Oral daily  insulin lispro (ADMELOG) corrective regimen sliding scale   SubCutaneous three times a day before meals  insulin lispro (ADMELOG) corrective regimen sliding scale   SubCutaneous at bedtime  lactulose Syrup 20 Gram(s) Oral two times a day  levothyroxine 150 MICROGram(s) Oral daily  OLANZapine 10 milliGRAM(s) Oral daily    MEDICATIONS  (PRN):  acetaminophen     Tablet .. 650 milliGRAM(s) Oral every 6 hours PRN Temp greater or equal to 38C (100.4F), Mild Pain (1 - 3)      __________________________________________________  REVIEW OF SYSTEMS:  unable to obtain full ROS due to mental status       Vital Signs Last 24 Hrs  T(C): 36.4 (04 Jan 2023 11:58), Max: 36.6 (04 Jan 2023 05:13)  T(F): 97.5 (04 Jan 2023 11:58), Max: 97.9 (04 Jan 2023 05:13)  HR: 78 (04 Jan 2023 11:58) (78 - 92)  BP: 124/60 (04 Jan 2023 11:58) (122/59 - 124/61)  BP(mean): --  RR: 20 (04 Jan 2023 11:58) (20 - 20)  SpO2: 98% (04 Jan 2023 11:58) (93% - 98%)    Parameters below as of 04 Jan 2023 11:58  Patient On (Oxygen Delivery Method): nasal cannula  O2 Flow (L/min): 2      ________________________________________________  PHYSICAL EXAM:  GENERAL: NAD bruise to lt side of the forehead   HEENT: Normocephalic;  conjunctivae and sclerae clear; moist mucous membranes;   NECK : supple  CHEST/LUNG: Clear to auscultation bilaterally with good air entry + O2 2L/min via NC   HEART: S1 S2  regular; no murmurs, gallops or rubs  ABDOMEN: Soft, Nontender, Nondistended; Bowel sounds present  EXTREMITIES: no cyanosis; no edema; no calf tenderness  SKIN: warm and dry; no rash  NERVOUS SYSTEM:  Awake and alert; Oriented  to place, person   _________________________________________________  LABS:                        12.2   12.40 )-----------( 277      ( 03 Jan 2023 07:31 )             39.9     01-03    141  |  101  |  30<H>  ----------------------------<  175<H>  3.2<L>   |  30  |  0.81    Ca    9.3      03 Jan 2023 07:31          CAPILLARY BLOOD GLUCOSE      POCT Blood Glucose.: 144 mg/dL (04 Jan 2023 17:06)  POCT Blood Glucose.: 192 mg/dL (04 Jan 2023 11:22)  POCT Blood Glucose.: 119 mg/dL (04 Jan 2023 07:39)  POCT Blood Glucose.: 159 mg/dL (03 Jan 2023 22:22)        RADIOLOGY & ADDITIONAL TESTS:    Imaging Personally Reviewed:  NO    Consultant(s) Notes Reviewed:   YES    Care Discussed with Consultants : endo/ pulm    Plan of care was discussed with patient and /or primary care giver; all questions and concerns were addressed and care was aligned with patient's wishes.

## 2023-01-04 NOTE — PROGRESS NOTE ADULT - PROBLEM SELECTOR PLAN 1
- Admitted with fever 102.2  - Given vanco and Unasyn in ED.  - CXR shows There is mild patchy opacification the lungs appears compatible with mild pulmonary edema.     - Serum lactate normal  - blood cultures and urine culture-NGTD  - c/f Cellulitis but more likely venous stasis- d/c'd Unasyn  - continue to monitor off Antibiotics  - resolving

## 2023-01-04 NOTE — PROGRESS NOTE ADULT - ASSESSMENT
77 year old F with PMH of HTN, hypothyroidism, CHF, COPD, bipolar d/o, schizoaffective d/o, DM presents after fall. Pt reports she fell from her wheelchair, denies LOC but reports head trauma and left lower leg trauma. In the ED found to have fever and tachycardia. Initially admitted to  for Sepsis 2/2 cellulitis and COPD Exacerbation. CXR shows mild patchy opacification the lungs. CTH shows +Hematome forehead. No bleeding or midline shift noted, started on IV Unasyn and IV steroids. Urine cx, Blood Cx remained NGTD. Cellulitis more likely venous stasis, IV Unasyn d/c'd, respiratory symptoms also improved and transitioned to oral steroids. Patient was downgraded to medicine service on  12/26. PT reccs [facility], family refusing [facility] would like placement at [facility] following. Emergency contact Ms. Angela spoke with janice Lopez to d/c back to ECC pt remains stable for discharge, unable to contact with Angela today

## 2023-01-04 NOTE — PROGRESS NOTE ADULT - SUBJECTIVE AND OBJECTIVE BOX
Patient is a 77y old  Female who presents with a chief complaint of Fall (04 Jan 2023 18:20)    PATIENT IS SEEN AND EXAMINED IN MEDICAL FLOOR. Patient reports she has improvement in b/l lower extremity pain.     ALLERGIES:  Avelox (Unknown)  moxifloxacin (Unknown)      Daily     Daily     VITALS:    Vital Signs Last 24 Hrs  T(C): 36.4 (04 Jan 2023 11:58), Max: 36.6 (04 Jan 2023 05:13)  T(F): 97.5 (04 Jan 2023 11:58), Max: 97.9 (04 Jan 2023 05:13)  HR: 78 (04 Jan 2023 11:58) (78 - 92)  BP: 124/60 (04 Jan 2023 11:58) (122/59 - 124/61)  BP(mean): --  RR: 20 (04 Jan 2023 11:58) (20 - 20)  SpO2: 98% (04 Jan 2023 11:58) (93% - 98%)    Parameters below as of 04 Jan 2023 11:58  Patient On (Oxygen Delivery Method): nasal cannula  O2 Flow (L/min): 2      LABS:    CBC Full  -  ( 03 Jan 2023 07:31 )  WBC Count : 12.40 K/uL  RBC Count : 4.77 M/uL  Hemoglobin : 12.2 g/dL  Hematocrit : 39.9 %  Platelet Count - Automated : 277 K/uL  Mean Cell Volume : 83.6 fl  Mean Cell Hemoglobin : 25.6 pg  Mean Cell Hemoglobin Concentration : 30.6 gm/dL  Auto Neutrophil # : x  Auto Lymphocyte # : x  Auto Monocyte # : x  Auto Eosinophil # : x  Auto Basophil # : x  Auto Neutrophil % : x  Auto Lymphocyte % : x  Auto Monocyte % : x  Auto Eosinophil % : x  Auto Basophil % : x      01-03    141  |  101  |  30<H>  ----------------------------<  175<H>  3.2<L>   |  30  |  0.81    Ca    9.3      03 Jan 2023 07:31      CAPILLARY BLOOD GLUCOSE      POCT Blood Glucose.: 144 mg/dL (04 Jan 2023 17:06)  POCT Blood Glucose.: 192 mg/dL (04 Jan 2023 11:22)  POCT Blood Glucose.: 119 mg/dL (04 Jan 2023 07:39)  POCT Blood Glucose.: 159 mg/dL (03 Jan 2023 22:22)          Creatinine Trend: 0.81<--, 0.63<--, 0.76<--, 0.74<--, 0.63<--, 0.66<--  I&O's Summary    04 Jan 2023 07:01  -  04 Jan 2023 18:42  --------------------------------------------------------  IN: 0 mL / OUT: 1200 mL / NET: -1200 mL            Clean Catch Clean Catch (Midstream)  12-24 @ 04:10   No growth  --  --      .Blood Blood-Peripheral  12-23 @ 22:22   No Growth Final  --  --      .Blood Blood-Peripheral  12-23 @ 22:00   No Growth Final  --  --          MEDICATIONS:    MEDICATIONS  (STANDING):  albuterol    90 MICROgram(s) HFA Inhaler 2 Puff(s) Inhalation every 6 hours  amLODIPine   Tablet 10 milliGRAM(s) Oral daily  artificial tears (preservative free) Ophthalmic Solution 1 Drop(s) Both EYES two times a day  aspirin  chewable 81 milliGRAM(s) Oral daily  budesonide 160 MICROgram(s)/formoterol 4.5 MICROgram(s) Inhaler 2 Puff(s) Inhalation two times a day  enoxaparin Injectable 40 milliGRAM(s) SubCutaneous every 24 hours  furosemide    Tablet 40 milliGRAM(s) Oral daily  insulin lispro (ADMELOG) corrective regimen sliding scale   SubCutaneous three times a day before meals  insulin lispro (ADMELOG) corrective regimen sliding scale   SubCutaneous at bedtime  lactulose Syrup 20 Gram(s) Oral two times a day  levothyroxine 150 MICROGram(s) Oral daily  OLANZapine 10 milliGRAM(s) Oral daily      MEDICATIONS  (PRN):  acetaminophen     Tablet .. 650 milliGRAM(s) Oral every 6 hours PRN Temp greater or equal to 38C (100.4F), Mild Pain (1 - 3)      REVIEW OF SYSTEMS:                           ALL ROS DONE [ X   ]    CONSTITUTIONAL:  LETHARGIC [   ], FEVER [   ], UNRESPONSIVE [   ]  CVS:  CP  [   ], SOB, [   ], PALPITATIONS [   ], DIZZYNESS [   ]  RS: COUGH [   ], SPUTUM [   ]  GI: ABDOMINAL PAIN [   ], NAUSEA [   ], VOMITINGS [   ], DIARRHEA [   ], CONSTIPATION [   ]  :  DYSURIA [   ], NOCTURIA [   ], INCREASED FREQUENCY [   ], DRIBLING [   ],  SKELETAL: PAINFUL JOINTS [   ], SWOLLEN JOINTS [   ], NECK ACHE [   ], LOW BACK ACHE [   ],  SKIN : ULCERS [   ], RASH [   ], ITCHING [   ]  CNS: HEAD ACHE [   ], DOUBLE VISION [   ], BLURRED VISION [   ], AMS / CONFUSION [   ], SEIZURES [   ], WEAKNESS [   ],TINGLING / NUMBNESS [   ]    PHYSICAL EXAMINATION:  GENERAL APPEARANCE: NO DISTRESS  HEENT:  NO PALLOR, NO  JVD,  NO   NODES, NECK SUPPLE    ;   BRUISING ON FACE +  CVS: S1 +, S2 +,   RS: AEEB,  OCCASIONAL  RALES +,   NO RONCHI  ABD: SOFT, NT, NO, BS +  EXT: NO PE  SKIN: WARM,   SKELETAL:  ROM ACCEPTABLE  CNS:  AAO X 2-3    RADIOLOGY :    REVIEWED    ASSESSMENT :     Chronic obstructive pulmonary disease with acute exacerbation    HTN (hypertension)    Schizo affective schizophrenia    Bipolar 1 disorder    Hypothyroidism    Arthritis    Hypercholesteremia    Chronic obstructive pulmonary disease, unspecified COPD type    Chronic diastolic congestive heart failure    Interstitial lung disease    Poor historian    No significant past surgical history        PLAN:  HPI:  77 year old F with PMH of HTN, hypothyroidism, CHF, COPD, bipolar d/o, schizoaffective d/o, DM presents after fall. Pt reports she fell from her wheelchair, denies LOC but reports head trauma and left lower leg trauma and is now pain free. Denies pain, fever, chills, shortness of breath, cough, wheezing, chest pain, lightheadedness, dizziness, n/v/d, changes in urinary or bowel habits.    ED Course:  Vitals: /69 P 120 R 18 T 98.2F > 102.2F  SpO2 97% RA  Meds: unasyn, solumedrol 125 mg IV, duonebs x 2 (24 Dec 2022 03:22)    # PATIENT IS PENDING LICO PLACEMENT    # SEPSIS S/T RLE CELLULITIS IMPROVED S/P ABX    # S/P MECHANICAL FALL W/ SCALP/FACE  HEMATOMA - S/P PT EVAL    # COPD EXACERBATION S/P STEROID COURSE, ON ALBUTEROL AND SYMBICORT - PULMONOLOGY EVALUATION IN PROGRESS    # CHRONIC DIASTOLIC CHF - ON LASIX, STRICT IS AND OS    # HYPOTHYROIDISM  # LOW TFTS  - LEVOTHYROXINE DOSE ADJUSTED   - F/U TFTS IN 4-6 WEEKS  - NOTED ENDOCRINOLOGY CONSULT    # HYPOKALEMIA  - REPLETING WITH SUPPLEMENT    # AMBULATORY DYSFUNCTION S/T PVD, NEUROPATHY  - PT EVAL NOTED    # GI AND DVT PPX

## 2023-01-04 NOTE — PROGRESS NOTE ADULT - PROBLEM SELECTOR PROBLEM 1
Sepsis, unspecified organism

## 2023-01-04 NOTE — PROGRESS NOTE ADULT - PROVIDER SPECIALTY LIST ADULT
Endocrinology
Internal Medicine
Cardiology
Internal Medicine
Internal Medicine
Pulmonology
Pulmonology
Internal Medicine
Internal Medicine
Pulmonology
Pulmonology
Internal Medicine
Critical Care
Internal Medicine
Critical Care
Internal Medicine
Internal Medicine

## 2023-01-04 NOTE — PROGRESS NOTE ADULT - PROBLEM SELECTOR PLAN 3
- S/P mechanical fall at NH  - +trauma/hematoma to forehead. Denying any pain  - CT +Hematome forehead. No bleeding or midline shift noted on CT scan  - Monitor size of hematoma  - C/O left hip pain. Left hip xray No acute fractures noted +Chronic hip fractures (compared to previous)  - Tylenol only for pain  - Maintain fall and safety measures  - PT reccs LICO
Known COPD  Uses Breo and albuterol at facility.  Does not use oxygen or BIPAP  Presented with significant wheezing in ED  CXR shows mild patchy opacification the lungs appears compatible with mild pulmonary edema.     Continue bronchodilators and ICS  Continue oxygen support. Currently tolerating 2LPM. Monitor oxygen saturation  Change solumedrol to PO prednisone 20mg QD.  Titrate Oxygen as needed.
Known COPD  Uses Breo and albuterol at facility.  Does not use oxygen or BIPAP  Presented with significant wheezing in ED  CXR shows mild patchy opacification the lungs appears compatible with mild pulmonary edema.     Continue bronchodilators and ICS  Continue oxygen support. Currently tolerating 2LPM. Monitor oxygen saturation  Change solumedrol to PO prednisone 20mg QD.  Titrate Oxygen as tolerated.
- GOLD 4C  - Uses Breo and albuterol at facility.  - Does not use oxygen or BIPAP  - Presented with significant wheezing in ED  - CXR shows mild patchy opacification the lungs appears compatible with mild pulmonary edema.     - Continue bronchodilators and ICS  - Continue oxygen support. Currently tolerating 2LPM. Monitor oxygen saturation. Titrate oxygen as tolerated.  - completed Prednisone taper
Known COPD  Uses Breo and albuterol at facility.  Does not use oxygen or BIPAP  Presented with significant wheezing in ED  CXR shows mild patchy opacification the lungs appears compatible with mild pulmonary edema.     Continue bronchodilators and ICS  Continue oxygen support. Currently tolerating 2LPM. Monitor oxygen saturation  Change solumedrol to PO prednisone 20mg QD.  Titrate Oxygen as needed.
Known COPD  Uses Breo and albuterol at facility.  Does not use oxygen or BIPAP  Presented with significant wheezing in ED  Continue bronchodilators and ICS  Continue oxygen support. Currently tolerating 2LPM. Monitor oxygen saturation  Continue solumedrol 40mg every 8 hours.
GOLD 4C  Uses Breo and albuterol at facility.  Does not use oxygen or BIPAP  Presented with significant wheezing in ED  CXR shows mild patchy opacification the lungs appears compatible with mild pulmonary edema.     Continue bronchodilators and ICS  Continue oxygen support. Currently tolerating 2LPM. Monitor oxygen saturation. Titrate oxygen as tolerated.  Prednisone taper.
+Cellulitis bilateral lower extremities.  Continue Unasyn.  F/U blood cultures and urine culture.    monitor improvement
Known COPD  Uses Breo and albuterol at facility.  Does not use oxygen or BIPAP  Presented with significant wheezing in ED  CXR shows mild patchy opacification the lungs appears compatible with mild pulmonary edema.     Continue bronchodilators and ICS  Continue oxygen support. Currently tolerating 2LPM. Monitor oxygen saturation  Change solumedrol to PO prednisone 20mg QD.  Titrate Oxygen as needed.

## 2023-01-05 ENCOUNTER — TRANSCRIPTION ENCOUNTER (OUTPATIENT)
Age: 78
End: 2023-01-05

## 2023-01-05 VITALS
OXYGEN SATURATION: 98 % | DIASTOLIC BLOOD PRESSURE: 70 MMHG | RESPIRATION RATE: 19 BRPM | HEART RATE: 80 BPM | TEMPERATURE: 98 F | SYSTOLIC BLOOD PRESSURE: 129 MMHG

## 2023-01-05 LAB — GLUCOSE BLDC GLUCOMTR-MCNC: 186 MG/DL — HIGH (ref 70–99)

## 2023-01-05 PROCEDURE — 73502 X-RAY EXAM HIP UNI 2-3 VIEWS: CPT

## 2023-01-05 PROCEDURE — 83036 HEMOGLOBIN GLYCOSYLATED A1C: CPT

## 2023-01-05 PROCEDURE — 94640 AIRWAY INHALATION TREATMENT: CPT

## 2023-01-05 PROCEDURE — 71045 X-RAY EXAM CHEST 1 VIEW: CPT

## 2023-01-05 PROCEDURE — 80048 BASIC METABOLIC PNL TOTAL CA: CPT

## 2023-01-05 PROCEDURE — 85025 COMPLETE CBC W/AUTO DIFF WBC: CPT

## 2023-01-05 PROCEDURE — 85027 COMPLETE CBC AUTOMATED: CPT

## 2023-01-05 PROCEDURE — 83605 ASSAY OF LACTIC ACID: CPT

## 2023-01-05 PROCEDURE — 84443 ASSAY THYROID STIM HORMONE: CPT

## 2023-01-05 PROCEDURE — 84100 ASSAY OF PHOSPHORUS: CPT

## 2023-01-05 PROCEDURE — 81001 URINALYSIS AUTO W/SCOPE: CPT

## 2023-01-05 PROCEDURE — 87086 URINE CULTURE/COLONY COUNT: CPT

## 2023-01-05 PROCEDURE — 87637 SARSCOV2&INF A&B&RSV AMP PRB: CPT

## 2023-01-05 PROCEDURE — 83735 ASSAY OF MAGNESIUM: CPT

## 2023-01-05 PROCEDURE — 97110 THERAPEUTIC EXERCISES: CPT

## 2023-01-05 PROCEDURE — 96374 THER/PROPH/DIAG INJ IV PUSH: CPT

## 2023-01-05 PROCEDURE — 70450 CT HEAD/BRAIN W/O DYE: CPT | Mod: MG

## 2023-01-05 PROCEDURE — 86376 MICROSOMAL ANTIBODY EACH: CPT

## 2023-01-05 PROCEDURE — 36415 COLL VENOUS BLD VENIPUNCTURE: CPT

## 2023-01-05 PROCEDURE — 84439 ASSAY OF FREE THYROXINE: CPT

## 2023-01-05 PROCEDURE — 80053 COMPREHEN METABOLIC PANEL: CPT

## 2023-01-05 PROCEDURE — 87640 STAPH A DNA AMP PROBE: CPT

## 2023-01-05 PROCEDURE — 99285 EMERGENCY DEPT VISIT HI MDM: CPT

## 2023-01-05 PROCEDURE — 84484 ASSAY OF TROPONIN QUANT: CPT

## 2023-01-05 PROCEDURE — 84481 FREE ASSAY (FT-3): CPT

## 2023-01-05 PROCEDURE — 93306 TTE W/DOPPLER COMPLETE: CPT

## 2023-01-05 PROCEDURE — 82962 GLUCOSE BLOOD TEST: CPT

## 2023-01-05 PROCEDURE — 82803 BLOOD GASES ANY COMBINATION: CPT

## 2023-01-05 PROCEDURE — 97161 PT EVAL LOW COMPLEX 20 MIN: CPT

## 2023-01-05 PROCEDURE — 87040 BLOOD CULTURE FOR BACTERIA: CPT

## 2023-01-05 PROCEDURE — 86803 HEPATITIS C AB TEST: CPT

## 2023-01-05 PROCEDURE — 80061 LIPID PANEL: CPT

## 2023-01-05 PROCEDURE — G1004: CPT

## 2023-01-05 PROCEDURE — 87641 MR-STAPH DNA AMP PROBE: CPT

## 2023-01-05 PROCEDURE — 93005 ELECTROCARDIOGRAM TRACING: CPT

## 2023-01-05 PROCEDURE — 0225U NFCT DS DNA&RNA 21 SARSCOV2: CPT

## 2023-01-05 PROCEDURE — 84436 ASSAY OF TOTAL THYROXINE: CPT

## 2023-01-05 PROCEDURE — 87635 SARS-COV-2 COVID-19 AMP PRB: CPT

## 2023-01-05 RX ADMIN — AMLODIPINE BESYLATE 10 MILLIGRAM(S): 2.5 TABLET ORAL at 05:31

## 2023-01-05 RX ADMIN — Medication 1: at 07:56

## 2023-01-05 RX ADMIN — ENOXAPARIN SODIUM 40 MILLIGRAM(S): 100 INJECTION SUBCUTANEOUS at 05:31

## 2023-01-05 RX ADMIN — BUDESONIDE AND FORMOTEROL FUMARATE DIHYDRATE 2 PUFF(S): 160; 4.5 AEROSOL RESPIRATORY (INHALATION) at 09:42

## 2023-01-05 RX ADMIN — Medication 40 MILLIGRAM(S): at 05:31

## 2023-01-05 RX ADMIN — Medication 1 DROP(S): at 05:32

## 2023-01-05 RX ADMIN — Medication 150 MICROGRAM(S): at 05:31

## 2023-01-05 RX ADMIN — ALBUTEROL 2 PUFF(S): 90 AEROSOL, METERED ORAL at 05:32

## 2023-01-05 RX ADMIN — ALBUTEROL 2 PUFF(S): 90 AEROSOL, METERED ORAL at 08:23

## 2023-01-05 NOTE — DISCHARGE NOTE NURSING/CASE MANAGEMENT/SOCIAL WORK - PATIENT PORTAL LINK FT
You can access the FollowMyHealth Patient Portal offered by [facility] by registering at the following website: [email or web address]. By joining [employer]’s FollowMyHealth portal, you will also be able to view your health information using other applications (apps) compatible with our system.

## 2023-01-05 NOTE — DISCHARGE NOTE NURSING/CASE MANAGEMENT/SOCIAL WORK - NURSING SECTION COMPLETE
Patient/Caregiver provided printed discharge information. History of Present Illness		  59 Y/O Female H/O Seizures (last episode ), REJI Mild, Pacemaker  (Bradycardia, fainted). C/O Neck pain radiating down bilateral arms for about 20 years, numbness left pinky and ring fingers, left leg pain and numbness. Seen MD. S/P Cat scan, EMG. Presents for surgery     Allergies/Medications:   Allergies:        Allergies:  	Diflucan: Drug, Rash  	Duricef: Drug, Unknown, rash       Intolerances:  	morphine: Drug, Vomiting  	sulfa drugs: Drug Category, Unknown, Pt refusing sulfa because her uncle  from it    Home Medications:   * Patient Currently Takes Medications as of 2019 07:34 documented in Structured Notes  · 	atenolol 25 mg oral tablet: Last Dose Taken:  , 1 tab(s) orally once a day  · 	lamoTRIgine 100 mg oral tablet: Last Dose Taken:  , 1 tab(s) orally once a day (in the morning)  · 	lamoTRIgine 150 mg oral tablet: Last Dose Taken:  , 1 tab(s) orally once a day (at bedtime)    PMH/PSH/FH/SH:    Past Medical History:  Bradycardia    Cervical disc displacement    Endometriosis, Mild    EP (Epilepsy)  - seizure x 2 - , and in     h/o - left Tennis Elbow    Hx of sleep apnea - had sleep study done in ?  - does not use CPAP machine  uses oral appliance  Pacemaker - insertion in     Radiculopathy of cervical region    Sick sinus syndrome.     Past Surgical History:  h/o cardiac pacemaker insertion -   battery replaced , [employer] Model L331/ serial [id number]  H/O colonoscopy    History of D&C    History of D&C - 15 yrs ago    History of laparoscopy - many yrs ago.    19- Patient to the hospital for elective surgery, underwent an ACDF- tolerated procedure without complications.  19- Patient had drain removed this am. and was evaluated by Physical therapy whom recommended home for discharge plan.  Patient is stable for discharge when cleared by PT.

## 2023-03-09 NOTE — PHYSICAL THERAPY INITIAL EVALUATION ADULT - GAIT DISTANCE, PT EVAL
You can access the FollowMyHealth Patient Portal offered by [facility] by registering at the following website: [email or web address]. By joining [employer]’s FollowMyHealth portal, you will also be able to view your health information using other applications (apps) compatible with our system. 15ft

## 2023-05-13 NOTE — H&P ADULT. - NS HEP C RISK YEAR OPTION
[FreeTextEntry1] : 21-year-old woman, here with her mother, with diagnosis of  EGPA diagnosed in  in [place]. In 02/22/21, CT chest with mediastinal LAD, GGO and parenchymal density, small pericardial effusion as per report. \par She subsequently found peripheral eosinophilia up to 70%  Had bone marrow biopsy and no lymphoproliferative disease found. 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 Beside disease age of onset pt with multiple EGPA specific ROS, had positive ANCA ( but no record available to review) She was treated by Rheumatologist in [place] with good clinical response and remission as per pt. she was off  treatment from November 2021 to March 2022 at which time, restarted steroids and MTX 15 mg and increased to 20 mg.  Patient unable to tolerate side effects of steroids, self tapered off, continues Rasuvo 20 mg weekly.  Patient was started on Nucala 300 mg every 4 weeks in August 2022 as well.  Patient noted to have cardiomegaly on recent CT chest, follow-up with cardiology revealed severe LV dysfunction with cavity dilation and noncoronary subendocardial LGE on cardiac MRI.  Has NYHA class II 3 symptoms and severely elevated BNP.  She is currently euvolemic and asymptomatic.  Previous cardiac MRI did not reveal coronary vasculitis, patient to  CT angio of the coronaries with IV contrast without coronary vasculitis. 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 Discussed with cardiologist, increasing restrictive symptoms, will continue Nucala 300 mg every 4 weeks, and worsening restrictive symptoms, would try cyclophosphamide treatment. Discussed at length with patient and mother, discussed risks and benefits, information provided to patient as well. will check labs today and proceed with therapy.  Will update labs today in office.  Case reviewed with cardiologist as well.\par 
Patient Refused

## 2023-07-29 NOTE — PROGRESS NOTE ADULT - PROBLEM SELECTOR PLAN 1
Admitted with fever 102.2  Given vanco and Unasyn in ED.  CXR shows There is mild patchy opacification the lungs appears compatible with mild pulmonary edema.     Continue Unasyn.  Serum lactate normal.  F/U blood cultures and urine culture.   +Cellulitis bilateral lower extremities. 154.94

## 2023-09-01 NOTE — PROGRESS NOTE ADULT - PROBLEM SELECTOR PLAN 5
Continue sliding scale coverage.  Monitor glucose.
s/p K supplement  resolved  pt takes Lasix.   Monitor serum potassium.
- Not acute exacerbation  - Continue amlodipine and home dose lasix.  - c/w Statin, ASA,
Continue sliding scale coverage.  Monitor glucose.
s/p K supplement  resolved  pt takes Lasix.   Monitor serum potassium.
Continue sliding scale coverage.  Monitor glucose.
Continue sliding scale coverage.  Monitor glucose.
- Hgb A1c: 5.8  - glucose controlled  - Continue sliding scale coverage.  - Monitor glucose.
Not acute exacerbation  Continue amlodipine and lasix.  c/w Statin, ASA,
PAST MEDICAL HISTORY:  Asthma

## 2023-10-04 RX ORDER — OXYCODONE AND ACETAMINOPHEN 5; 325 MG/1; MG/1
1 TABLET ORAL
Qty: 0 | Refills: 0 | DISCHARGE

## 2023-10-04 RX ORDER — FUROSEMIDE 40 MG
1 TABLET ORAL
Qty: 0 | Refills: 0 | DISCHARGE

## 2023-10-04 RX ORDER — LOSARTAN POTASSIUM 100 MG/1
1 TABLET, FILM COATED ORAL
Qty: 0 | Refills: 0 | DISCHARGE

## 2023-10-04 RX ORDER — METFORMIN HYDROCHLORIDE 850 MG/1
2 TABLET ORAL
Qty: 0 | Refills: 0 | DISCHARGE

## 2023-10-04 RX ORDER — LEVOTHYROXINE SODIUM 125 MCG
1 TABLET ORAL
Qty: 0 | Refills: 0 | DISCHARGE

## 2023-10-04 RX ORDER — METFORMIN HYDROCHLORIDE 850 MG/1
1 TABLET ORAL
Qty: 0 | Refills: 0 | DISCHARGE

## 2023-10-04 RX ORDER — MONTELUKAST 4 MG/1
1 TABLET, CHEWABLE ORAL
Qty: 0 | Refills: 0 | DISCHARGE

## 2023-10-04 RX ORDER — RISPERIDONE 4 MG/1
0 TABLET ORAL
Qty: 0 | Refills: 0 | DISCHARGE

## 2023-10-04 RX ORDER — LACTULOSE 10 G/15ML
30 SOLUTION ORAL
Qty: 0 | Refills: 0 | DISCHARGE

## 2023-10-04 RX ORDER — OLANZAPINE 15 MG/1
1 TABLET, FILM COATED ORAL
Qty: 0 | Refills: 0 | DISCHARGE

## 2023-10-04 RX ORDER — AMLODIPINE BESYLATE 2.5 MG/1
1 TABLET ORAL
Qty: 0 | Refills: 0 | DISCHARGE

## 2023-10-04 RX ORDER — ROSUVASTATIN CALCIUM 5 MG/1
1 TABLET ORAL
Qty: 0 | Refills: 0 | DISCHARGE

## 2023-10-04 RX ORDER — OXYCODONE AND ACETAMINOPHEN 5; 325 MG/1; MG/1
2 TABLET ORAL
Qty: 0 | Refills: 0 | DISCHARGE

## 2023-10-04 RX ORDER — ASPIRIN/CALCIUM CARB/MAGNESIUM 324 MG
1 TABLET ORAL
Qty: 0 | Refills: 0 | DISCHARGE

## 2023-10-04 RX ORDER — FLUTICASONE FUROATE AND VILANTEROL TRIFENATATE 100; 25 UG/1; UG/1
1 POWDER RESPIRATORY (INHALATION)
Qty: 0 | Refills: 0 | DISCHARGE

## 2023-11-17 NOTE — INPATIENT CERTIFICATION FOR MEDICARE PATIENTS - RISKS OF ADVERSE EVENTS
Addended by: CAT RYDER on: 11/17/2023 09:34 AM     Modules accepted: Orders    
Concern for cardiopulmonary deterioration/Concern for worsening infectious process

## 2024-02-11 NOTE — PATIENT PROFILE ADULT - FLU SEASON?
Alert-The patient is alert, awake and responds to voice. The patient is oriented to time, place, and person. The triage nurse is able to obtain subjective information.
Yes...

## 2024-09-09 NOTE — DIETITIAN INITIAL EVALUATION ADULT. - MD RECOMMEND
Received request via: Pharmacy    Was the patient seen in the last year in this department? Yes    Does the patient have an active prescription (recently filled or refills available) for medication(s) requested? No    Pharmacy Name: [facility]/pharmacy #[id number] - [place], [place] - [address] [facility]. (Pharmacy)     Does the patient have prison Plus and need 100-day supply? (This applies to ALL medications) Patient does not have SCP      [facility] pharmacy is requesting a refill on   guanFACINE (TENEX) 1 MG Tab last fill was sent on 5/06/24 3 refills. Patient has follow up on 10/01/24.   continue with carbohydrate consistent ,DASH/TLC diet/po supplement

## 2025-03-21 NOTE — DISCHARGE NOTE PROVIDER - EXTENDED VTE YES NO FOR MLM ASPIRIN
Bill For Surgical Tray: no Billing Type: Third-Party Bill Expected Date Of Service: 03/21/2025 Performing Laboratory: [id number] ,

## 2025-06-06 NOTE — DISCHARGE NOTE ADULT - NS AS ACTIVITY OBS
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 Palliative and Supportive Care was consulted to explore goals of care and malignant symptom management.    6/6/25: Supportive conversation with Ms. eFng alone in her room. She is very funny and ready to return home after she finishes treatment for her COVID pneumonia. She expresses deep gratefulness for my visits and the conversations that we've had. She is agreeable with follow up in the palliative care clinic and expresses hope that she can return home soon. Will not place hospice referral at this time as daughter desires for her mother to continue full supportive treatments and mother wants to also support daughter's coping with her serious illness.    Advance Care Planning   Goals of Care  - Code status: DNAR/DNI  - Next of kin: adult children  - ACP documents: none  - Patient has decision making capacity  - Prognosis: poor  - Goals: improvement in condition, symptom control, quality of life  - Plans: will establish care with outpatient palliative care in less than a week    Goals of Care Conversation:  - 6/4/25: Supportive conversation with Ms. Feng and her daughter Della at bedside. They are very welcoming of my visit. 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 Della explains to her mother my field (palliative), that it is a focus on alleviating symptom burdens to improve quality of life. I asked about their last clinic  "visit with her oncologist Dr. Kohler and Della explains to me that Dr. Peraza was worried that immunotherapy would worsen her mother's autoimmune hepatitis. They agreed that in order to protect Ms. Feng that they would avoid further immunotherapy. I asked about their conversation regarding hospice. Della explains that they hope to be able to extend her life and "focus on living." She feels that because her mother is not actively dying, hospice is not what would be best for her care and goals. Her mother tells me that she is not afraid of dying, [place] that death comes for all of us. She embraces that she will one day pass and has peace with it. Her daughter reminds her mother many times throughout our conversation that Ms. Feng is not dying and encourages her mother to "focus on living." Validated both that we can accept that we will all die one day, and until that day comes, we can focus on ways to improve quality of life to our best ability in order to protect her ortega and priorities during whatever days God has planned for her. Both nodded in response to this. Della admits that she feels that since her mother was diagnosed formally with lung cancer, her mother suddenly stopped eating due to appetite loss and was more depressed. Della alludes that in being given bad news, this affected her mother's mentality severely and now her mother keeps talking about negative things, like her death. Della admits that they are very different - that Della is an optimist and her mother is a pessimist. Ms. Feng shakes her head to this and reminds me that she is just accepting that she will one day pass, just as many family members before her have. 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  - I took time to explain really what our department is - stressing that unlike hospice, we do " not make house visits, are not available 24/7 or at a whim's notice. Della is very understanding of this, and appreciative that they can have palliative support for symptom management while they continue to explore disease-modifying treatments to buy her as much time as possible. Ms. Feng says that she just wants to focus on being happy. She admits that over the last couple of years, losing her independence has been very difficult to accept. She loves cooking and now her daughter Della cooks for her. Even though Della is an excellent cook, Ms. Feng will always modify her food to claim some control and sense of cooking again. However her appetite is poor and she has lost a lot of weight. In addition to this, she has poor sleep, wakes up due to excruciating RLE pain. Della wonders how a component of depression or anxiety may be influencing her mother's symptom burdens and feels that if these three things can be addressed, her mother might feel better and do better as well.        Neoplasm Related Pain  RLE Pain  - [place]  reviewed. Prescribed short course of oxycodone 5 mg which daughter was breaking into 2.5 mg doses for severe RLE pain at nighttime only  - Has lidocaine patch applied to right shoulder, where site of metastasis is located  - Likely has arthritis of RLE. Was evaluated extensively in the past and counseled by outpatient Orthopedic surgery that she has mild arthritis, and tried steroid shot that daughter believes triggered autoimmune hepatitis. High dose steroid treatment for autoimmune hepatitis took away the RLE pain, and pain returned during downtitration of steroids  - Of note, was referred to acupuncture and they attended this appointment, only to be told by the acupuncturist that he doesn't treat bone pain, only muscle pain and wouldn't treat any of her site of malignant pain. It was a very disappointing visit and wanted to let us know that for others being referred to acupuncture  therapy for holistic approach to care, that this acupuncturist made it very clear that he will not treat malignant pain.  - Continue APAP with modified limits in setting of known autoimmune hepatitis  - Continue diclofenac gel to joints  - Resume home oxycodone - because tablets cannot be broken in half, will order liquid oxycodone 2.5 mg PO q6h PRN severe pain    Poor Appetite  Mood Disturbance  Sleep Disturbance  - Trialed mirtazapine 7.5 mg at nighttime during this hospitalization, but was very restless until 3 am and then thereafter very groggy. Discontinued as to minimize polypharmacy and medicines that are not benefiting  - Due to autoimmune hepatitis, will not start SNRI or TCA  - Continue melatonin 6 mg qHS PRN insomnia   as per facility